# Patient Record
Sex: FEMALE | Race: BLACK OR AFRICAN AMERICAN | Employment: OTHER | URBAN - METROPOLITAN AREA
[De-identification: names, ages, dates, MRNs, and addresses within clinical notes are randomized per-mention and may not be internally consistent; named-entity substitution may affect disease eponyms.]

---

## 2017-05-30 ENCOUNTER — HOSPITAL ENCOUNTER (INPATIENT)
Facility: HOSPITAL | Age: 67
LOS: 8 days | Discharge: NURSING, REHAB OR LONG-TERM CARE | DRG: 871 | End: 2017-06-07
Attending: EMERGENCY MEDICINE | Admitting: INTERNAL MEDICINE
Payer: MEDICARE

## 2017-05-30 ENCOUNTER — APPOINTMENT (OUTPATIENT)
Dept: GENERAL RADIOLOGY | Facility: HOSPITAL | Age: 67
DRG: 871 | End: 2017-05-30
Attending: EMERGENCY MEDICINE
Payer: MEDICARE

## 2017-05-30 ENCOUNTER — APPOINTMENT (OUTPATIENT)
Dept: GENERAL RADIOLOGY | Facility: HOSPITAL | Age: 67
DRG: 871 | End: 2017-05-30
Attending: INTERNAL MEDICINE
Payer: MEDICARE

## 2017-05-30 DIAGNOSIS — I95.9 SEPSIS WITH HYPOTENSION (HCC): Primary | ICD-10-CM

## 2017-05-30 DIAGNOSIS — K44.9 HIATAL HERNIA: ICD-10-CM

## 2017-05-30 DIAGNOSIS — A41.9 SEPSIS WITH HYPOTENSION (HCC): Primary | ICD-10-CM

## 2017-05-30 DIAGNOSIS — K92.2 GASTROINTESTINAL HEMORRHAGE, UNSPECIFIED GASTROINTESTINAL HEMORRHAGE TYPE: ICD-10-CM

## 2017-05-30 DIAGNOSIS — K29.70 GASTRITIS: ICD-10-CM

## 2017-05-30 PROCEDURE — 71010 XR CHEST AP PORTABLE  (CPT=71010): CPT | Performed by: EMERGENCY MEDICINE

## 2017-05-30 PROCEDURE — 99291 CRITICAL CARE FIRST HOUR: CPT | Performed by: INTERNAL MEDICINE

## 2017-05-30 PROCEDURE — 71010 XR CHEST AP PORTABLE  (CPT=71010): CPT | Performed by: INTERNAL MEDICINE

## 2017-05-30 RX ORDER — SODIUM CHLORIDE 9 MG/ML
INJECTION, SOLUTION INTRAVENOUS CONTINUOUS
Status: DISCONTINUED | OUTPATIENT
Start: 2017-05-30 | End: 2017-06-03

## 2017-05-30 RX ORDER — ARGININE/ASCORBATE SOD/VITE AC 4.5 G/9.2G
1 POWDER IN PACKET (EA) ORAL 2 TIMES DAILY
Status: ON HOLD | COMMUNITY
End: 2021-02-07

## 2017-05-30 RX ORDER — PHENYTOIN 125 MG/5ML
200 SUSPENSION ORAL 2 TIMES DAILY
COMMUNITY

## 2017-05-30 RX ORDER — GEL DRESSING
1 GEL (GRAM) TOPICAL DAILY
Status: ON HOLD | COMMUNITY
End: 2021-02-07

## 2017-05-30 RX ORDER — PHENYTOIN 125 MG/5ML
200 SUSPENSION ORAL 2 TIMES DAILY
Status: DISCONTINUED | OUTPATIENT
Start: 2017-05-30 | End: 2017-06-07

## 2017-05-30 RX ORDER — MULTIVITAMIN
TABLET ORAL DAILY
COMMUNITY

## 2017-05-30 RX ORDER — CLONAZEPAM 0.5 MG/1
0.5 TABLET ORAL 2 TIMES DAILY PRN
Status: ON HOLD | COMMUNITY
End: 2021-02-07

## 2017-05-30 RX ORDER — SODIUM CHLORIDE 9 MG/ML
INJECTION, SOLUTION INTRAVENOUS CONTINUOUS
Status: DISCONTINUED | OUTPATIENT
Start: 2017-05-30 | End: 2017-06-02

## 2017-05-30 RX ORDER — ACETAMINOPHEN 325 MG/1
650 TABLET ORAL DAILY
COMMUNITY

## 2017-05-30 RX ORDER — ERGOCALCIFEROL (VITAMIN D2) 1250 MCG
5000 CAPSULE ORAL WEEKLY
COMMUNITY

## 2017-05-30 RX ORDER — FENTANYL 25 UG/H
1 PATCH TRANSDERMAL
Status: ON HOLD | COMMUNITY
End: 2021-02-07

## 2017-05-30 RX ORDER — ACETAMINOPHEN 325 MG/1
650 TABLET ORAL EVERY 4 HOURS PRN
COMMUNITY

## 2017-05-30 RX ORDER — MELATONIN
325 2 TIMES DAILY WITH MEALS
COMMUNITY

## 2017-05-30 RX ORDER — ZINC SULFATE 50(220)MG
110 CAPSULE ORAL 2 TIMES DAILY
COMMUNITY

## 2017-05-30 RX ORDER — MINERAL OIL/PETROLATUM,WHITE
CREAM (GRAM) TOPICAL AS NEEDED
COMMUNITY

## 2017-05-30 RX ORDER — PIPERACILLIN SODIUM, TAZOBACTAM SODIUM 4; .5 G/20ML; G/20ML
4 INJECTION, POWDER, LYOPHILIZED, FOR SOLUTION INTRAVENOUS EVERY 8 HOURS
Status: ON HOLD | COMMUNITY
End: 2017-06-07

## 2017-05-30 NOTE — ED PROVIDER NOTES
Patient Seen in: [facility] Emergency Department    History   Patient presents with:  Hypotension (cardiovascular)    Stated Complaint: hypotension    HPI    73year old female chronically debilitated, noncommunicative from nursing home with history are normal. Mucous membranes are not cyanotic. Eyes: Conjunctivae and lids are normal. Right conjunctiva is not injected. Left conjunctiva is not injected. No scleral icterus. Neck: No JVD present. Cardiovascular: Tachycardia present.     Pulmonary/Ch within normal limits   TROPONIN I, 0 HOUR - Normal   CBC WITH DIFFERENTIAL WITH PLATELET    Narrative: The following orders were created for panel order CBC WITH DIFFERENTIAL WITH PLATELET.   Procedure                               Abnormality         S Vancomycin HCl (VANCOCIN) 1,000 mg in sodium chloride 0.9 % 250 mL IVPB add-vantage (1,000 mg Intravenous New Bag 5/30/17 8552)         Procedures: None    Re-Evaluation: 4:50 PM, distal perfusion improved after initial bolus, blood pressure also improvi pressor support, admit icu. Further Inpatient evaluation and treatment will be required.  I personally discussed the results of the above ED workup and a number of associated acute management issues with the patient,though I dont believe she understands at

## 2017-05-30 NOTE — ED INITIAL ASSESSMENT (HPI)
Pt from [facility] NH sent for hypotension 70/30 and possible sepsis. Previously on vanco for ulcer, finished 2 weeks ago. 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 Pt with dopamine running per EMS with 0.9 bolus.

## 2017-05-30 NOTE — CONSULTS
[facility] HOSP - [facility]    Report of Consultation    Payton Guillecyndi Patient Status:  Emergency    1950 MRN [id number]   Location [address] Attending Samantha Cortes MD   Hosp Day # 0 PCP Dong Valerio MD, Boaz Catherine MD Continuous    Review of Systems: unable to discuss ROS with patient since she is non verbal  Physical Exam:    BP 89/59 mmHg  Pulse 132  Temp(Src) 101.4 °F (38.6 °C) (Rectal)  Resp 15  Ht 5' 5\" (1.651 m)  Wt 98 lb (44.453 kg)  BMI 16.31 kg/m2  SpO2 98% thoracic aorta. 2. Bilateral mixed alveolar and interstitial opacification, more pronounced in a right perihilar and basilar region. Minimal interstitial prominence left infrahilar region.  Old healed left rib fracture deformities                 Assessment

## 2017-05-31 ENCOUNTER — APPOINTMENT (OUTPATIENT)
Dept: GENERAL RADIOLOGY | Facility: HOSPITAL | Age: 67
DRG: 871 | End: 2017-05-31
Attending: INTERNAL MEDICINE
Payer: MEDICARE

## 2017-05-31 PROCEDURE — 99233 SBSQ HOSP IP/OBS HIGH 50: CPT | Performed by: INTERNAL MEDICINE

## 2017-05-31 PROCEDURE — 71010 XR CHEST AP PORTABLE  (CPT=71010): CPT | Performed by: INTERNAL MEDICINE

## 2017-05-31 RX ORDER — 0.9 % SODIUM CHLORIDE 0.9 %
VIAL (ML) INJECTION
Status: COMPLETED
Start: 2017-05-31 | End: 2017-05-31

## 2017-05-31 NOTE — SEPSIS REASSESSMENT
[facility] - [facility]    Sepsis Reassessment Note    BP 90/56 mmHg  Pulse 137  Temp(Src) 99.7 °F (37.6 °C) (Temporal)  Resp 21  Ht 5' 5\" (1.651 m)  Wt 98 lb (44.453 kg)  BMI 16.31 kg/m2  SpO2 98%     7:58 PM    Cardiac:  Regularity: Regular  Rate:

## 2017-05-31 NOTE — CONSULTS
[facility]    Report of Consultation    Remigio Rosales Patient Status:  Inpatient    1950 MRN [id number]   Location [facility] 2W/SW Attending Lizzette Tan MD   Hosp Day # 0 PCP Dayne Sherwood MD, Marcia Goncalves MD     Date of Admi mg by mouth 2 (two) times daily. ClonazePAM 0.5 MG Oral Tab Take 0.5 mg by mouth 2 (two) times daily as needed for Anxiety. DERMACERIN External Cream Apply topically as needed.    ergocalciferol 60169 units Oral Cap Take 5,000 Units by mouth once a week soft, non tender  Extremities: Contracted extremities  Neurologic: Nonverbal  Skin: warm, sacral decubitus ulcer present    Results:   Laboratory Data    Lab Results  Component Value Date   WBC 8.0 05/30/2017   HGB 9.3* 05/30/2017   HCT 28.3* 05/30/2017 bleeding. Endoscopy discussed with family by GI but declined at this time.   Trend hemoglobin.  -We will place NG tube and start home medications  -DVT prophylaxis: SCDs given questionable rectal bleeding  -Her prior records, patient full code at this time

## 2017-05-31 NOTE — CONSULTS
[facility]    Report of Consultation    Remigio Rosales Patient Status:  Inpatient    1950 MRN [id number]   Location [facility] 2W/SW Attending Lizzette Tan MD   Hosp Day # 1 PCP Dayne Sherwood MD, Marcia Goncalves MD     Date of Admi DERMACERIN External Cream Apply topically as needed. ergocalciferol 74219 units Oral Cap Take 5,000 Units by mouth once a week. fentaNYL 25 MCG/HR Transdermal Patch 72 Hr Place 1 patch onto the skin every third day.    ferrous sulfate 325 (65 FE) MG O soft  No c/c/e      Results:     Laboratory Data:    Lab Results  Component Value Date   WBC 14.8* 05/31/2017   HGB 9.2* 05/31/2017   HCT 28.0* 05/31/2017    05/31/2017   CREATSERUM 0.88 05/31/2017   BUN 39* 05/31/2017   * 05/31/2017   K 3.3 0 consolidations    Recommendations:    - d/c vancomycin, zosyn  - meropenem  - await id, sens GNR  - await Ucx  - follow temp, wbc to doc decr  - monitor for improvement  - will follow    Thank you for allowing me to participate in the care of your patient.

## 2017-05-31 NOTE — CM/SW NOTE
+BPCI. Patient is nonverbal.  Message left for family that patient is eligible for BPCI f/u for 3 months at [facility]. BPCI letter, Brochure and [facility]'s phone # left at bedside. Message left for sis-in-law (guardian) .     [facility]

## 2017-05-31 NOTE — PROGRESS NOTES
[facility]    GI Progress Note      Jadine Specking Patient Status:  Inpatient    1950 MRN [id number]   Location [facility] 2W/SW Attending Ryan Keenan MD   Hosp Day # 1 PCP Steve Elliott MD, Leland Mendez MD          SUBJECTIVE: fractures. 4. Atherosclerotic calcification aorta. Xr Chest Ap Portable  (cpt=71010)    5/30/2017  CONCLUSION:  1. Borderline cardiomegaly. Tortuous thoracic aorta.  2. Bilateral mixed alveolar and interstitial opacification, more pronounced in a ri

## 2017-05-31 NOTE — PLAN OF CARE
CARDIOVASCULAR - ADULT    • Maintains optimal cardiac output and hemodynamic stability Not Progressing        HEMATOLOGIC - ADULT    • Maintains hematologic stability Not Progressing          CARDIOVASCULAR - ADULT    • Absence of cardiac arrhythmias or at

## 2017-05-31 NOTE — PROGRESS NOTES
[facility] dosing service    Initial Pharmacokinetic Consult for Vancomycin Dosing     Natali Ruiz is a 73year old female admitted on 5/30 who is being treated for sepsis. Pharmacy has been asked to dose Vancomycin by Dr. Andrea Carbone.     She has No Know her.  We appreciate the opportunity to assist in her care.     Roman Vicente, PharmD  5/30/2017  8:51 PM  [address]: [phone]

## 2017-05-31 NOTE — PROGRESS NOTES
[facility] [place] [facility]     Progress Note        Phill Hooker Patient Status:  Inpatient    1950 MRN [id number]   Location [facility] 2W/SW Attending Gabrielle Pablo MD   Hosp Day # 1 PCP Jevon Looney MD, Haroon Escamilla MD       Subjective: Nonverbal  Skin: warm, dry      Results:     Lab Results  Component Value Date   WBC 14.8 05/31/2017   HGB 9.2 05/31/2017   HCT 28.0 05/31/2017    05/31/2017   CREATSERUM 0.88 05/31/2017   BUN 39 05/31/2017    05/31/2017   K 3.3 05/31/2017   C Hypoalbuminemia  9.  Dysphagia  10.  Lactic acidosis  11. Gram-negative bacteremia  12.   Transaminitis     Plan   -Patient presents from nursing home with chief complaint of hypotension.  Nonverbal.  Recently admitted to [facility]

## 2017-05-31 NOTE — WOUND PROGRESS NOTE
WOUND CARE NOTE      PLAN   Recommendations:  Turn schedules  Specialty bed: First step overlay  Heels elevated using pillows, heel wedge or heel boots to offload heels  To prevent sliding: decrease head of bed and elevate foot of bed as medical conditio

## 2017-05-31 NOTE — PROGRESS NOTES
[facility]    Progress Note    Heavenly Montalvo Patient Status:  Inpatient    1950 MRN [id number]   Location [facility] 2W/SW Attending Ray Loya MD   Hosp Day # 1 PCP Jaye Crook MD, Milly Crowley MD     Subjective:     Jean-Paul Alvarado fractures. 4. Atherosclerotic calcification aorta. Xr Chest Ap Portable  (cpt=71010)    5/30/2017  CONCLUSION:  1. Borderline cardiomegaly. Tortuous thoracic aorta.  2. Bilateral mixed alveolar and interstitial opacification, more pronounced in a ri

## 2017-05-31 NOTE — PROGRESS NOTES
Merrem (meropenem) 500 mg IV q8h was ordered for Barbra Hallman by Dr. Marry Samson. Body mass index is 17.61 kg/(m^2). Wt Readings from Last 6 Encounters:  05/31/17 : 105 lb 12.8 oz      Estimated Creatinine Clearance: 48.3 mL/min (based on Cr of 0.88).

## 2017-05-31 NOTE — PLAN OF CARE
Problem: NEUROLOGICAL - ADULT  Goal: Achieves stable or improved neurological status  INTERVENTIONS  - Assess for and report changes in neurological status  - Initiate measures to prevent increased intracranial pressure  - Maintain blood pressure and fluid based on oxygen saturation or ABGs  - Provide Smoking Cessation handout, if applicable  - Encourage broncho-pulmonary hygiene including cough, deep breathe, Incentive Spirometry  - Assess the need for suctioning and perform as needed  - Assess and instruct Progressing    Problem: HEMATOLOGIC - ADULT  Goal: Maintains hematologic stability  INTERVENTIONS  - Assess for signs and symptoms of bleeding or hemorrhage  - Monitor labs and vital signs for trends  - Administer supportive blood products/factors, fluids

## 2017-05-31 NOTE — H&P
[facility] HOSP - [facility]    History and Physical    José Miguel Ferguson Patient Status:  Inpatient    1950 MRN [id number]   Location [facility] 2W/SW Attending Cristy Sheldon MD   Hosp Day # 0 PCP Adele Murry MD, Freda Yuan MD     Date:  20 (4-0.5) g Intravenous Recon Soln 4 mg every 8 (eight) hours. collagenase (SANTYL) 250 UNIT/GM External Ointment Apply topically daily. acetaminophen 325 MG Oral Tab Take 650 mg by mouth every 4 (four) hours as needed for Pain.    acetaminophen 325 MG Or Nasogastric tube at junction between gastric fundus and body. 2. Minimal atelectasis in the right lower lung. No evidence for pneumonia. 3. Multiple left rib fractures. 4. Atherosclerotic calcification aorta.          Xr Chest Ap Portable  (cpt=71010)    5/

## 2017-06-01 ENCOUNTER — APPOINTMENT (OUTPATIENT)
Dept: ULTRASOUND IMAGING | Facility: HOSPITAL | Age: 67
DRG: 871 | End: 2017-06-01
Attending: INTERNAL MEDICINE
Payer: MEDICARE

## 2017-06-01 ENCOUNTER — APPOINTMENT (OUTPATIENT)
Dept: GENERAL RADIOLOGY | Facility: HOSPITAL | Age: 67
DRG: 871 | End: 2017-06-01
Attending: INTERNAL MEDICINE
Payer: MEDICARE

## 2017-06-01 PROCEDURE — 71010 XR CHEST AP PORTABLE  (CPT=71010): CPT | Performed by: INTERNAL MEDICINE

## 2017-06-01 PROCEDURE — 76700 US EXAM ABDOM COMPLETE: CPT | Performed by: INTERNAL MEDICINE

## 2017-06-01 PROCEDURE — 99233 SBSQ HOSP IP/OBS HIGH 50: CPT | Performed by: INTERNAL MEDICINE

## 2017-06-01 RX ORDER — POTASSIUM CHLORIDE 14.9 MG/ML
20 INJECTION INTRAVENOUS ONCE
Status: COMPLETED | OUTPATIENT
Start: 2017-06-01 | End: 2017-06-01

## 2017-06-01 NOTE — PROGRESS NOTES
INFECTIOUS DISEASE PROGRESS NOTE    Rah Linares Patient Status:  Inpatient    1950 MRN [id number]   Location [facility] 2W/SW Attending Rachel Bloom MD   Hosp Day # 2 PCP Jf Ward MD, Isiah Burks MD     Subjective:  Patient seen and ex obvious calculus.  The right kidney length measures 11.1 cm. Right renal cyst measuring 1.7 x 1.2 x 1.3 cm. The left kidney length measures 10.9 cm.  Left renal cyst measuring 2.4 x 1.8 x 2.2 cm. AORTA/VASCULAR:     Normal.  No aneurysm.  Patent IVC.

## 2017-06-01 NOTE — PROGRESS NOTES
Pharmacy adjusted Merrem (meropenem) dosing to 500 mg IV q12 for [place] due to renal impairment. Body mass index is 17.77 kg/(m^2).   Wt Readings from Last 6 Encounters:  06/01/17 : 106 lb 12.8 oz      Estimated Creatinine Clearance: 76.5 mL/min

## 2017-06-01 NOTE — PROGRESS NOTES
[facility]     Progress Note        Heavenly Montalvo Patient Status:  Inpatient    1950 MRN [id number]   Location [facility] 2W/SW Attending Ray Loya MD   Hosp Day # 2 PCP Jaye Crook MD, Milly Crowley MD       Subjective: Nonverbal  Skin: warm, dry      Results:       Lab Results  Component Value Date   WBC 16.6 06/01/2017   HGB 9.2 06/01/2017   HCT 28.6 06/01/2017    06/01/2017   CREATSERUM 0.56 06/01/2017   BUN 25 06/01/2017    06/01/2017   K 3.0 06/01/2017 occurred Electronically signed on 05/30/2017 at 16:44 by Hudson Weber   1.  Septic shock  2.  Possible blood loss anemia  3.  Proteus UTI  4.  Traumatic brain injury  5.  Likely infected sacral decubitus ulcer  6.  Acute kidney injury

## 2017-06-01 NOTE — PROGRESS NOTES
[facility] HOSP - [facility]    GI Progress Note      Liyah Deluca Patient Status:  Inpatient    1950 MRN [id number]   Location [facility] 2W/SW Attending Fantasma Kahn MD   Hosp Day # 2 PCP Justina Parry MD, William Clifford MD          SUBJECTIVE: LFTs and Hgb  No plans for endoscopy  Consider keofeed tube feeding   Eventual swallow evaluation. May need G tube for long term nutritional support.     Saroj Castellano MD  6/1/2017  11:38 AM

## 2017-06-01 NOTE — DIETARY NOTE
ADULT NUTRITION INITIAL ASSESSMENT    Pt is at moderate nutrition risk. Pt meets malnutrition criteria.       CRITERIA FOR MALNUTRITION DIAGNOSIS:  Criteria for severe malnutrition diagnosis: chronic illness related to energy intake less than 75% for great resume + thiamine 100mg daily.     - Feeding assistance: NPO    - Nutrition education: not appropriate    - Coordination of nutrition care: collaboration with other providers    - Discharge and transfer of nutrition care to new setting or provider: Monitor 7. 8* 8.1*   * 146* 148*   K 3.6 3.3 3.0*  3.0*    112* 116*   CO2 25 21* 21*   OSMOCALC 315* 314* 311*   Renal labs improving. LFT's improving. Low K- on electrolyte protocol. Suspect may have low Mg++ and Phos as well.  Ordered for am.       NU

## 2017-06-01 NOTE — PROGRESS NOTES
[facility]    Progress Note    Runell Filter Patient Status:  Inpatient    1950 MRN [id number]   Location [facility] 2W/SW Attending Marcial De León MD   Hosp Day # 2 PCP Chris Mendez MD, Vinnie Page MD     Subjective:     Con (cpt=71010)    5/30/2017  CONCLUSION:  1. Nasogastric tube at junction between gastric fundus and body. 2. Minimal atelectasis in the right lower lung. No evidence for pneumonia. 3. Multiple left rib fractures. 4. Atherosclerotic calcification aorta.

## 2017-06-01 NOTE — PLAN OF CARE
Problem: Patient Centered Care  Goal: Patient preferences are identified and integrated in the patient’s plan of care  Interventions:  - What would you like us to know as we care for you?  Patient’s guardian would like her to be comfortable  - Provide timel needed   Outcome: Not Progressing    Problem: SAFETY ADULT - FALL  Goal: Free from fall injury  INTERVENTIONS:  - Assess pt frequently for physical needs  - Identify cognitive and physical deficits and behaviors that affect risk of falls.   - [place] fall

## 2017-06-01 NOTE — PLAN OF CARE
Problem: ALTERED NUTRIENT INTAKE - ADULT  Goal: Nutrient intake appropriate for improving, restoring or maintaining nutritional needs  INTERVENTIONS:  - Assess nutritional status and recommend course of action  - Monitor oral intake if resumed, labs, and t

## 2017-06-02 PROCEDURE — 02HV33Z INSERTION OF INFUSION DEVICE INTO SUPERIOR VENA CAVA, PERCUTANEOUS APPROACH: ICD-10-PCS | Performed by: INTERNAL MEDICINE

## 2017-06-02 PROCEDURE — 99232 SBSQ HOSP IP/OBS MODERATE 35: CPT | Performed by: INTERNAL MEDICINE

## 2017-06-02 RX ORDER — MAGNESIUM SULFATE HEPTAHYDRATE 40 MG/ML
2 INJECTION, SOLUTION INTRAVENOUS ONCE
Status: COMPLETED | OUTPATIENT
Start: 2017-06-02 | End: 2017-06-02

## 2017-06-02 RX ORDER — SODIUM CHLORIDE 9 MG/ML
INJECTION, SOLUTION INTRAVENOUS
Status: COMPLETED
Start: 2017-06-02 | End: 2017-06-02

## 2017-06-02 RX ORDER — LIDOCAINE HYDROCHLORIDE 10 MG/ML
0.5 INJECTION, SOLUTION INFILTRATION; PERINEURAL ONCE AS NEEDED
Status: ACTIVE | OUTPATIENT
Start: 2017-06-02 | End: 2017-06-02

## 2017-06-02 RX ORDER — POTASSIUM CHLORIDE 29.8 MG/ML
40 INJECTION INTRAVENOUS ONCE
Status: COMPLETED | OUTPATIENT
Start: 2017-06-02 | End: 2017-06-02

## 2017-06-02 RX ORDER — SODIUM CHLORIDE 0.9 % (FLUSH) 0.9 %
10 SYRINGE (ML) INJECTION AS NEEDED
Status: DISCONTINUED | OUTPATIENT
Start: 2017-06-02 | End: 2017-06-07

## 2017-06-02 RX ORDER — DEXTROSE MONOHYDRATE 100 MG/ML
INJECTION, SOLUTION INTRAVENOUS CONTINUOUS PRN
Status: DISCONTINUED | OUTPATIENT
Start: 2017-06-02 | End: 2017-06-07

## 2017-06-02 NOTE — DIETARY NOTE
Nutrition Note Update:    Received consult for TF management. Discussed with RN TF specifics and need to replete lytes ( Mg and phos). When safe to initiate TF, use Jevity 1.2 @ 25ml/hr, increase by 10 ml q 12 hrs to minimum goal rate of 50ml/hr.

## 2017-06-02 NOTE — PROGRESS NOTES
PICC Line Insertion Note    Procedure:  Insertion of # 5 FR / Double Power Solo    Indications:  Home IV therapy, Poor Venous Access    Procedure Details   Patient and/or significant others educated regarding insertion of PICC line, rationale for procedure,

## 2017-06-02 NOTE — PROGRESS NOTES
[place] [facility] - [facility]    Progress Note    Runell Filter Patient Status:  Inpatient    1950 MRN [id number]   Location [facility] 2W/SW Attending Marcial De León MD   Hosp Day # 3 PCP Chris Mendez MD, Vinnie Page MD     Subjective:     Jonathan Nelson

## 2017-06-02 NOTE — PROGRESS NOTES
INFECTIOUS DISEASE PROGRESS NOTE    Wayne Garrison Patient Status:  Inpatient    1950 MRN [id number]   Location [facility] 2W/SW Attending Angel Leary MD   Hosp Day # 3 PCP Neville Randolph MD, Saskia Sahni MD     Subjective:  Patient seen and ex unremarkable. SPLEEN:          Normal.  Normal size and echotexture.  Spleen length measures 10.7 cm.    KIDNEYS:        No mass, obstruction or obvious calculus.  The right kidney length measures 11.1 cm. Right renal cyst measuring 1.7 x 1.2 x 1.3 cm.  Lawerance Matar

## 2017-06-02 NOTE — PLAN OF CARE
Inpatient to Inpatient Transfer    Reason for Transfer:  No longer ccu status     SBAR Reviewed and Verbal Report:  Given to shantel TAYLOR    Patient's Family Notified of Transfer and Given Unit Phone Number/Visiting Hours:  Yes      Comments:  Patient sent w

## 2017-06-02 NOTE — PROGRESS NOTES
[place] [facility] - [facility]     Progress Note        Isaiah Vega Patient Status:  Inpatient    1950 MRN [id number]   Location [facility] 2W/SW Attending Gisselle Marinelli MD   Hosp Day # 3 PCP Austyn Matute MD, Evangelista Love MD       Subjective: Date   WBC 13.7 06/02/2017   HGB 9.7 06/02/2017   HCT 29.9 06/02/2017    06/02/2017   CREATSERUM 0.59 06/02/2017   BUN 21 06/02/2017    06/02/2017   K 3.4 06/02/2017   K 3.4 06/02/2017    06/02/2017   CO2 21 06/02/2017   GLU 89 06/02/201

## 2017-06-02 NOTE — PROGRESS NOTES
[place] [facility] - [facility]    GI Progress Note      Roseanne Franklin Patient Status:  Inpatient    1950 MRN [id number]   Location [facility] 2W/SW Attending Missael Padgett MD   Hosp Day # 3 PCP DELIO RODRIGUEZ [facility] MD, Malia Wu MD          SUBJECTIVE:

## 2017-06-03 PROCEDURE — 99233 SBSQ HOSP IP/OBS HIGH 50: CPT | Performed by: INTERNAL MEDICINE

## 2017-06-03 RX ORDER — ACETAMINOPHEN 160 MG/5ML
650 SOLUTION ORAL EVERY 6 HOURS PRN
Status: DISCONTINUED | OUTPATIENT
Start: 2017-06-03 | End: 2017-06-07

## 2017-06-03 RX ORDER — MAGNESIUM SULFATE HEPTAHYDRATE 40 MG/ML
2 INJECTION, SOLUTION INTRAVENOUS ONCE
Status: COMPLETED | OUTPATIENT
Start: 2017-06-03 | End: 2017-06-03

## 2017-06-03 RX ORDER — DEXTROSE MONOHYDRATE 50 MG/ML
2000 INJECTION, SOLUTION INTRAVENOUS CONTINUOUS
Status: DISPENSED | OUTPATIENT
Start: 2017-06-03 | End: 2017-06-04

## 2017-06-03 NOTE — PROGRESS NOTES
[facility]  Gastroenterology Progress Note    Payton Guillecyndi Patient Status:  Inpatient    1950 MRN [id number]   Location [facility] 5SW/SE Attending Anna MD Earl   Hosp Day # 4 PCP Dong Valerio MD, Boaz Catherine MD     Subjective:  R

## 2017-06-03 NOTE — PLAN OF CARE
Problem: Patient/Family Goals  Goal: Patient/Family Long Term Goal  Patient’s Long Term Goal: Patient’s guardian would like her to be discharged    Interventions:  - follow plan of care  - See additional Care Plan goals for specific interventions   Outcome Assess for changes in mentation and behavior  - Position to facilitate oxygenation and minimize respiratory effort  - Oxygen supplementation based on oxygen saturation or ABGs  - Provide Smoking Cessation handout, if applicable  - Encourage broncho-pulmona frequently for physical needs  - Identify cognitive and physical deficits and behaviors that affect risk of falls.   - [place] fall precautions as indicated by assessment.  - Educate pt/family on patient safety including physical limitations  - Instruct p

## 2017-06-03 NOTE — PROGRESS NOTES
Pulmonary/Critical Care Follow Up Note    HPI:   Daniel Wolff is a 73year old female with Patient presents with:  Hypotension (cardiovascular)      PCP Julio Cesar Colón MD, Mayito Clarke MD  Admission Attending Tianna Silva 15 Day #4    Non verbal 06/03/2017   PHOS 2.4 06/03/2017       Lab Results  Component Value Date   HGB 9.7* 06/02/2017   HGB 9.2* 06/01/2017   HGB 9.2* 05/31/2017   HGB 9.3* 05/30/2017           ASSESSMENT/PLAN:     1.  Septic shock  Resolved    2.  Anemia  Stable    3.  Proteus

## 2017-06-04 PROCEDURE — 99232 SBSQ HOSP IP/OBS MODERATE 35: CPT | Performed by: INTERNAL MEDICINE

## 2017-06-04 RX ORDER — SODIUM CHLORIDE 9 MG/ML
INJECTION, SOLUTION INTRAVENOUS
Status: COMPLETED
Start: 2017-06-04 | End: 2017-06-04

## 2017-06-04 RX ORDER — MAGNESIUM SULFATE HEPTAHYDRATE 40 MG/ML
2 INJECTION, SOLUTION INTRAVENOUS ONCE
Status: COMPLETED | OUTPATIENT
Start: 2017-06-04 | End: 2017-06-04

## 2017-06-04 RX ORDER — SODIUM CHLORIDE 9 MG/ML
INJECTION, SOLUTION INTRAVENOUS CONTINUOUS
Status: DISCONTINUED | OUTPATIENT
Start: 2017-06-04 | End: 2017-06-06

## 2017-06-04 NOTE — PROGRESS NOTES
[facility]    Progress Note    Hema Adas Patient Status:  Inpatient    1950 MRN [id number]   Location [facility] 5SW/SE Attending Roberto Jorgensen MD   Hosp Day # 5 PCP Selin Haque MD, Aggie Espinal MD     Subjective:     Dayton Guevara

## 2017-06-04 NOTE — PLAN OF CARE
Problem: Patient Centered Care  Goal: Patient preferences are identified and integrated in the patient’s plan of care  Interventions:  - What would you like us to know as we care for you?  Patient’s guardian would like her to be comfortable  - Provide timel arrhythmias or at baseline  INTERVENTIONS:  - Continuous cardiac monitoring, monitor vital signs, obtain 12 lead EKG if indicated  - Evaluate effectiveness of antiarrhythmic and heart rate control medications as ordered  - Initiate emergency measures for l fall precautions as indicated by assessment.  - Educate pt/family on patient safety including physical limitations  - Instruct pt to call for assistance with activity based on assessment  - Modify environment to reduce risk of injury  - Provide assistive d

## 2017-06-04 NOTE — PROGRESS NOTES
[place] [facility] - [facility]    Progress Note    Chela Lilly Patient Status:  Inpatient    1950 MRN [id number]   Location [facility] 5SW/SE Attending Christina López MD   Hosp Day # 5 PCP Asa Packer MD, Danyell Mcallister MD     Subjective:     David Kaur

## 2017-06-05 PROCEDURE — 99232 SBSQ HOSP IP/OBS MODERATE 35: CPT | Performed by: INTERNAL MEDICINE

## 2017-06-05 RX ORDER — MAGNESIUM SULFATE HEPTAHYDRATE 40 MG/ML
2 INJECTION, SOLUTION INTRAVENOUS ONCE
Status: COMPLETED | OUTPATIENT
Start: 2017-06-05 | End: 2017-06-05

## 2017-06-05 RX ORDER — HYDROCODONE BITARTRATE AND ACETAMINOPHEN 5; 325 MG/1; MG/1
1 TABLET ORAL EVERY 8 HOURS PRN
Status: DISCONTINUED | OUTPATIENT
Start: 2017-06-05 | End: 2017-06-07

## 2017-06-05 NOTE — WOUND PROGRESS NOTE
WOUND CARE NOTE      PLAN   Recommendations:  Dietary is following the pt.   Turn schedules  Specialty bed: 1st step overlay air mattress in place  Heels elevated using pillows, heel wedge or heel boots to offload heels  Use of lift equipment  To prevent ALKPHO 139* 06/02/2017   BILT 1.2 06/02/2017   TP 4.9* 06/02/2017   AST 94* 06/02/2017   ALT 73* 06/02/2017   MG 1.7* 06/05/2017   PHOS 2.4 06/03/2017

## 2017-06-05 NOTE — PROGRESS NOTES
GI  PROGRESS NOTE    SUBJECTIVE: requested to see pt re G-tube placement. Notes reviewed. Pt non-verbal, opens eyes spontaneously.        OBJECTIVE:  Temp:  [96.9 °F (36.1 °C)-97.5 °F (36.4 °C)] 96.9 °F (36.1 °C)  Pulse:  [84-92] 84  Resp:  [18-20] 18  BP

## 2017-06-05 NOTE — PROGRESS NOTES
INFECTIOUS DISEASE PROGRESS NOTE    Gissel Napoles Patient Status:  Inpatient    1950 MRN [id number]   Location [facility] 2W/SW Attending Rai Steen MD   Hosp Day # 6 PCP Kala Hair MD, Ham Cm MD     Subjective:  Patient seen and ex 10.9 cm.  Left renal cyst measuring 2.4 x 1.8 x 2.2 cm.   AORTA/VASCULAR:     Normal.  No aneurysm.  Patent IVC.     OTHER:           Small amount of ascites.      Dictated by (CST): Nory Cornejo MD on 6/01/2017 at 9:36        Approved by (CST): Cecilio Alvarez

## 2017-06-05 NOTE — CM/SW NOTE
Called Dr Dhiraj Mujica regarding plan for long term nutition goal. Patient will need G tube placement. Call to GI and Rn notified  Dr Mando Moss notified for G tube placement.  Md will come see the patient  Monica Colunga Phone # [phone]

## 2017-06-05 NOTE — PLAN OF CARE
Problem: NEUROLOGICAL - ADULT  Goal: Absence of seizures  INTERVENTIONS  - Monitor for seizure activity  - Administer anti-seizure medications as ordered  - Monitor neurological status   Outcome: Not Progressing  Pt non verbal, unable to express needs    P Progressing  Pt seen by wound nurse, with new orders.     Problem: SAFETY ADULT - FALL  Goal: Free from fall injury  INTERVENTIONS:  - Assess pt frequently for physical needs  - Identify cognitive and physical deficits and behaviors that affect risk of fall

## 2017-06-05 NOTE — PROGRESS NOTES
[facility]    Progress Note    Aditya Worthy Patient Status:  Inpatient    1950 MRN [id number]   Location [facility] 5SW/SE Attending Daniel Rose MD   Hosp Day # 6 PCP Macy Grullon MD, Natalio Steen MD     Subjective:     Hipolito Kent

## 2017-06-05 NOTE — PROGRESS NOTES
[facility]    Progress Note    Heavenly Montalvo Patient Status:  Inpatient    1950 MRN [id number]   Location [facility] 5SW/SE Attending Ray Loya MD   Hosp Day # 6 PCP Jaye Crook MD, Milly Crowley MD     Subjective:     Shan Terrazas

## 2017-06-06 ENCOUNTER — ANESTHESIA EVENT (OUTPATIENT)
Dept: ENDOSCOPY | Facility: HOSPITAL | Age: 67
DRG: 871 | End: 2017-06-06
Payer: MEDICARE

## 2017-06-06 ENCOUNTER — ANESTHESIA (OUTPATIENT)
Dept: ENDOSCOPY | Facility: HOSPITAL | Age: 67
DRG: 871 | End: 2017-06-06
Payer: MEDICARE

## 2017-06-06 ENCOUNTER — SURGERY (OUTPATIENT)
Age: 67
End: 2017-06-06

## 2017-06-06 PROCEDURE — 0DH68UZ INSERTION OF FEEDING DEVICE INTO STOMACH, VIA NATURAL OR ARTIFICIAL OPENING ENDOSCOPIC: ICD-10-PCS | Performed by: INTERNAL MEDICINE

## 2017-06-06 RX ORDER — MAGNESIUM SULFATE HEPTAHYDRATE 40 MG/ML
2 INJECTION, SOLUTION INTRAVENOUS ONCE
Status: COMPLETED | OUTPATIENT
Start: 2017-06-06 | End: 2017-06-06

## 2017-06-06 RX ORDER — DEXTROSE AND SODIUM CHLORIDE 5; .45 G/100ML; G/100ML
INJECTION, SOLUTION INTRAVENOUS CONTINUOUS
Status: DISCONTINUED | OUTPATIENT
Start: 2017-06-06 | End: 2017-06-07

## 2017-06-06 RX ORDER — SODIUM CHLORIDE 9 MG/ML
INJECTION, SOLUTION INTRAVENOUS
Status: COMPLETED
Start: 2017-06-06 | End: 2017-06-06

## 2017-06-06 RX ORDER — SODIUM CHLORIDE, SODIUM LACTATE, POTASSIUM CHLORIDE, CALCIUM CHLORIDE 600; 310; 30; 20 MG/100ML; MG/100ML; MG/100ML; MG/100ML
INJECTION, SOLUTION INTRAVENOUS CONTINUOUS PRN
Status: DISCONTINUED | OUTPATIENT
Start: 2017-06-06 | End: 2017-06-06 | Stop reason: SURG

## 2017-06-06 RX ORDER — MORPHINE SULFATE 2 MG/ML
2 INJECTION, SOLUTION INTRAMUSCULAR; INTRAVENOUS EVERY 6 HOURS PRN
Status: DISCONTINUED | OUTPATIENT
Start: 2017-06-06 | End: 2017-06-07

## 2017-06-06 RX ADMIN — SODIUM CHLORIDE, SODIUM LACTATE, POTASSIUM CHLORIDE, CALCIUM CHLORIDE: 600; 310; 30; 20 INJECTION, SOLUTION INTRAVENOUS at 14:51:00

## 2017-06-06 RX ADMIN — SODIUM CHLORIDE, SODIUM LACTATE, POTASSIUM CHLORIDE, CALCIUM CHLORIDE: 600; 310; 30; 20 INJECTION, SOLUTION INTRAVENOUS at 14:25:00

## 2017-06-06 NOTE — ANESTHESIA PREPROCEDURE EVALUATION
Anesthesia PreOp Note    HPI:     Ana Ocampo is a 73year old female who presents for preoperative consultation requested by: Gabrielle Solis MD    Date of Surgery: 6/6/2017    Procedure(s):  PERCUTANEOUS ENDOSCOPIC GASTROSTOMY PLACEMENT(PEG)  ESOPHAGOGAS Intravenous Recon Soln 4 mg every 8 (eight) hours. Disp:  Rfl:     collagenase (SANTYL) 250 UNIT/GM External Ointment Apply topically daily. Disp:  Rfl:     acetaminophen 325 MG Oral Tab Take 650 mg by mouth every 4 (four) hours as needed for Pain.  Disp: dextrose 10 % infusion  Intravenous Continuous PRN Kenneth Mixon DO     Pantoprazole Sodium (PROTONIX) 40 mg in Sodium Chloride 0.9 % 10 mL IV push 40 mg Intravenous Q24H Claudine Dumont MD 40 mg at 06/05/17 1726    phenytoin (DILANTIN) 125 MG/5ML Height:       Weight:       SpO2: 97% 96% 98% 97%        Anesthesia ROS/Med Hx and Physical Exam     Patient summary reviewed    Airway   Comment: Unable to participate in assessment  Dental      Pulmonary    (+) decreased breath sounds,   Cardiovascular

## 2017-06-06 NOTE — PROGRESS NOTES
[facility]    Progress Note    Rah Linares Patient Status:  Inpatient    1950 MRN [id number]   Location [facility] 5SW/SE Attending Rachel Bloom MD   Hosp Day # 7 PCP Jf Ward MD, Isiah Burks MD     Subjective:     Thedamaso Semen

## 2017-06-06 NOTE — PLAN OF CARE
Problem: Patient Centered Care  Goal: Patient preferences are identified and integrated in the patient’s plan of care  Interventions:  - What would you like us to know as we care for you?  Patient’s guardian would like her to be comfortable  - Provide timel for seizure activity  - Administer anti-seizure medications as ordered  - Monitor neurological status   Outcome: Progressing  No seizure activity this shift.      Problem: CARDIOVASCULAR - ADULT  Goal: Maintains optimal cardiac output and hemodynamic stabil Maintains or returns to baseline bowel function  INTERVENTIONS:  - Assess bowel function  - Maintain adequate hydration with IV or PO as ordered and tolerated  - Evaluate effectiveness of GI medications  - Encourage mobilization and activity  - Obtain nutr Assess pt frequently for physical needs  - Identify cognitive and physical deficits and behaviors that affect risk of falls.   - Jewett fall precautions as indicated by assessment.  - Educate pt/family on patient safety including physical limitations  -

## 2017-06-06 NOTE — DIETARY NOTE
ADULT NUTRITION INITIAL ASSESSMENT    Pt is at moderate nutrition risk. Pt meets malnutrition criteria.       CRITERIA FOR MALNUTRITION DIAGNOSIS:  Criteria for severe malnutrition diagnosis: chronic illness related to energy intake less than 75% for great iron, zinc and PTA. Would resume + thiamine 100mg daily.   - Feeding assistance: NPO  - Discharge and transfer of nutrition care to new setting or provider: Monitor plans. From [facility] PTA.      ADMITTING DIAGNOSIS: Sepsis with hypotension ([facility] [address] [A41.9] --   --    K 3.4  3.4 3.4 3.9  --    *  --   --   --    CO2 23  --   --   --    PHOS 2.4  --   --   --    OSMOCALC 317*  --   --   --      NUTRITION RELATED PHYSICAL FINDINGS:  - Body Fat/Muscle Mass: severe depletion body fat and severe depletion mu

## 2017-06-06 NOTE — ANESTHESIA POSTPROCEDURE EVALUATION
Patient: Oscar Alvarez    Procedure Summary     Date Anesthesia Start Anesthesia Stop Room / Location    06/06/17 1426  [address] [facility] ENDOSCOPY 01 / 300 [facility] ENDOSCOPY       Procedure Diagnosis Surgeon Responsible Provider    PERCUTANEOUS ENDOSCOPIC GASTROSTOMY PLACEMEN

## 2017-06-06 NOTE — OPERATIVE REPORT
ENDOSCOPY OPERATIVE REPORT    Patient Name: Tim Mujica Record #: [id number]  YOB: 1950  Date of Procedure: 6/6/2017    Preoperative Diagnosis: oropharyngeal dysphagia, need for enteral nutrition.    Postoperative Diagnosis:    1 transillumination could not be re-demonstrated. A different site was then selected but transillumination and good digital impression. A small 6 mm incision was then made at this site. A trochar was introduced through the incision.  The trochar was grabbed w

## 2017-06-06 NOTE — H&P
History & Physical Examination    Patient Name: Linsey Mustafa  MRN: [id number]  CSN: [id number]  YOB: 1950    Diagnosis: dysphagia. Present Illness: dysphagia.        Prescriptions prior to admission:  ascorbic acid 100 MG Oral Tab Take 1 sublingual every 2 hours when necessary pain Disp: 30 mL Rfl: 0    LORazepam 2 MG/ML Oral Conc Take 0.25 mL (0.5 mg total) by mouth every 2 (two) hours as needed.  Disp: 30 mL Rfl: 1    Atropine Sulfate 1 % Ophthalmic Solution 1-2 drops SUBLINGUAL- every ho [ ]    OTHER        [ x ] I have discussed the risks and benefits and alternatives with the patient/family. They understand and agree to proceed with plan of care. [ x ] I have reviewed the History and Physical done within the last 30 days.   Any changes

## 2017-06-06 NOTE — OR NURSING
Tolerated 20ft peg tube with minimal bleeding  Binder to site Family contacted for anesthesia consent

## 2017-06-07 VITALS
OXYGEN SATURATION: 96 % | WEIGHT: 136 LBS | HEIGHT: 65 IN | TEMPERATURE: 97 F | RESPIRATION RATE: 20 BRPM | BODY MASS INDEX: 22.66 KG/M2 | SYSTOLIC BLOOD PRESSURE: 105 MMHG | HEART RATE: 89 BPM | DIASTOLIC BLOOD PRESSURE: 55 MMHG

## 2017-06-07 PROCEDURE — 99232 SBSQ HOSP IP/OBS MODERATE 35: CPT | Performed by: INTERNAL MEDICINE

## 2017-06-07 NOTE — PLAN OF CARE
Problem: Patient Centered Care  Goal: Patient preferences are identified and integrated in the patient’s plan of care  Interventions:  - What would you like us to know as we care for you?  Patient’s guardian would like her to be comfortable  - Provide timel neurological status   Outcome: Progressing  No evidence of seizure activity.     Problem: CARDIOVASCULAR - ADULT  Goal: Maintains optimal cardiac output and hemodynamic stability  INTERVENTIONS:  - Monitor vital signs, rhythm, and trends  - Monitor for blee returns to baseline bowel function  INTERVENTIONS:  - Assess bowel function  - Maintain adequate hydration with IV or PO as ordered and tolerated  - Evaluate effectiveness of GI medications  - Encourage mobilization and activity  - Obtain nutritional consu products/factors as ordered and appropriate   Outcome: Progressing  No H&H levels drawn today    Problem: SAFETY ADULT - FALL  Goal: Free from fall injury  INTERVENTIONS:  - Assess pt frequently for physical needs  - Identify cognitive and physical deficit

## 2017-06-07 NOTE — PROGRESS NOTES
[facility]     Progress Note        Gayemc Abiel Patient Status:  Inpatient    1950 MRN [id number]   Location [facility] 2W/SW Attending Roberto Pacheco MD   Hosp Day # 8 PCP Buck Nagel MD, Alen Real MD       Subjective: decubitus ulcer  6.  Acute kidney injury, resolved  7.  Hypernatremia  8.  Hypoalbuminemia  9.  Dysphagia  10.  Lactic acidosis, resolved  11. Gram-negative bacteremia  12.   Transaminitis     Plan   -Patient presents from nursing home with chief complaint

## 2017-06-07 NOTE — PROGRESS NOTES
GI  PROGRESS NOTE    SUBJECTIVE: moans when touched.        OBJECTIVE:  Temp:  [96.1 °F (35.6 °C)-97 °F (36.1 °C)] 96.6 °F (35.9 °C)  Pulse:  [77-90] 86  Resp:  [12-18] 18  BP: (101-129)/(56-97) 113/60 mmHg  Exam  Gen: No acute distress  CV/L: neg  Abd: A

## 2017-06-07 NOTE — PROGRESS NOTES
INFECTIOUS DISEASE PROGRESS NOTE    Yvette York Patient Status:  Inpatient    1950 MRN [id number]   Location [facility] 2W/SW Attending Alonzo Diez MD   Hosp Day # 8 PCP Dhiraj Garcia MD, Sergio Keenan MD     Subjective:  Patient seen and ex by (CST): Walker Rutledge MD on 6/01/2017 at 9:36        Approved by (CST): Walker Rutledge MD on 6/01/2017 at 9:43            =====  CONCLUSION:   1. Hepatomegaly. 2. Status post cholecystectomy. No biliary ductal dilatation. 3. Small amount of ascites.

## 2017-06-07 NOTE — PLAN OF CARE
Problem: Patient Centered Care  Goal: Patient preferences are identified and integrated in the patient’s plan of care  Interventions:  - What would you like us to know as we care for you?  Patient’s guardian would like her to be comfortable  - Provide timel trends  - Monitor for bleeding, hypotension and signs of decreased cardiac output  - Evaluate effectiveness of vasoactive medications to optimize hemodynamic stability  - Monitor arterial and/or venous puncture sites for bleeding and/or hematoma  - Assess routine/schedule  - Consider collaborating with pharmacy to review patient’s medication profile   Outcome: Progressing    Problem: SKIN/TISSUE INTEGRITY - ADULT  Goal: Skin integrity remains intact  INTERVENTIONS  - Assess and document risk factors for pre environment to reduce risk of injury  - Provide assistive devices as appropriate  - Consider OT/PT consult to assist with strengthening/mobility  - Encourage toileting schedule   Outcome: Progressing    Comments:   Received call from guardian.  Provided upd

## 2017-06-07 NOTE — PLAN OF CARE
Problem: Patient Centered Care  Goal: Patient preferences are identified and integrated in the patient’s plan of care  Interventions:  - What would you like us to know as we care for you?  Patient’s guardian would like her to be comfortable  - Provide timel Administer anti-seizure medications as ordered  - Monitor neurological status   Outcome: Adequate for Discharge  No seizure activity noted during shift.     Problem: CARDIOVASCULAR - ADULT  Goal: Maintains optimal cardiac output and hemodynamic stability  I Discharge  Patient on room air.     Problem: GASTROINTESTINAL - ADULT  Goal: Maintains or returns to baseline bowel function  INTERVENTIONS:  - Assess bowel function  - Maintain adequate hydration with IV or PO as ordered and tolerated  - Evaluate effective products/factors, fluids and medications as ordered and appropriate  - Administer supportive blood products/factors as ordered and appropriate   Outcome: Adequate for Discharge    Problem: SAFETY ADULT - FALL  Goal: Free from fall injury  INTERVENTIONS:  -

## 2017-06-07 NOTE — PROGRESS NOTES
[address]    CDI Prediction Tool Protocol (Vancomycin Initiated)    OVP (oral vancomycin prophylaxis) 125 mg PO BID is being started in this patient based on a score of 14.   This patient is currently at high risk for developing CDI due to his/h

## 2017-06-07 NOTE — DISCHARGE SUMMARY
[facility] - [facility]    Discharge Summary    Daniel Wolff Patient Status:  Inpatient    1950 MRN [id number]   Location [facility] 5SW/SE Attending Myriam Tyson MD   Hosp Day # 8 PCP Julio Cesar Colón MD, Mayito Clarke MD     Date of Admissio FE) MG Oral Tab EC  Take 325 mg by mouth 2 (two) times daily with meals. Lidocaine HCl 2 % External Gel  Apply topically daily as needed (to sacrum for pain). Multiple Vitamin (MULTI-DAY VITAMINS) Oral Tab  Take by mouth daily.     OxyCODONE HCl, Abus

## 2017-06-07 NOTE — DISCHARGE PLANNING
2:35pm Update- Primary contact Zee Acosta called back to confirm her number as [phone]. This has been updated on the facesheet. --  2:20pm-SW informed of dc for today. RN to clarify if the picc is needed prior to dc.  Bed confirmed at [facility] sn[facility] for to

## 2019-03-18 ENCOUNTER — APPOINTMENT (OUTPATIENT)
Dept: GENERAL RADIOLOGY | Facility: HOSPITAL | Age: 69
End: 2019-03-18
Attending: EMERGENCY MEDICINE
Payer: MEDICARE

## 2019-03-18 ENCOUNTER — HOSPITAL ENCOUNTER (EMERGENCY)
Facility: HOSPITAL | Age: 69
Discharge: HOME | End: 2019-03-18
Attending: EMERGENCY MEDICINE
Payer: MEDICARE

## 2019-03-18 VITALS
WEIGHT: 136 LBS | RESPIRATION RATE: 17 BRPM | HEART RATE: 89 BPM | DIASTOLIC BLOOD PRESSURE: 51 MMHG | BODY MASS INDEX: 22.66 KG/M2 | SYSTOLIC BLOOD PRESSURE: 107 MMHG | OXYGEN SATURATION: 98 % | TEMPERATURE: 99 F | HEIGHT: 65 IN

## 2019-03-18 DIAGNOSIS — T85.598A FEEDING TUBE DYSFUNCTION, INITIAL ENCOUNTER: Primary | ICD-10-CM

## 2019-03-18 PROCEDURE — 49465 FLUORO EXAM OF G/COLON TUBE: CPT | Performed by: EMERGENCY MEDICINE

## 2019-03-18 PROCEDURE — 99283 EMERGENCY DEPT VISIT LOW MDM: CPT | Performed by: EMERGENCY MEDICINE

## 2019-03-18 NOTE — ED INITIAL ASSESSMENT (HPI)
Pt with 18Fr G tube that she pulled out per [facility]. Pt from [facility]. Pt immediately placed on enteric contact precautions.

## 2019-03-18 NOTE — ED PROVIDER NOTES
Patient Seen in: [facility] Emergency Department    History   Patient presents with:  Cath Tube Problem (gastrointestinal, urinary, integumentary)    Stated Complaint: G tube pulled out per pt.  Pt from [facility]    HPI    79 y/o female from the [facility]: Normal rate, regular rhythm and intact distal pulses. Pulmonary/Chest: Effort normal. No respiratory distress. Abdominal: Soft.   Estevez in place  from the nursing home at the gastrostomy site without surrounding signs of infection or possible for a visit  As needed    We recommend that you schedule follow up care with a primary care provider within the next three months to obtain basic health screening including reassessment of your blood pressure.     Medications Prescribed:  Current D

## 2019-03-18 NOTE — ED NOTES
Pt arrived to ED and 22 Fr garza in place per facility. Unit distribution called for 18Fr. Gtube. Pt states she accidentally pulled 18 Fr. G tube out. Awaiting arrival of g tube for placement. No further concerns.

## 2019-03-23 ENCOUNTER — APPOINTMENT (OUTPATIENT)
Dept: GENERAL RADIOLOGY | Facility: HOSPITAL | Age: 69
End: 2019-03-23
Attending: EMERGENCY MEDICINE
Payer: MEDICARE

## 2019-03-23 ENCOUNTER — HOSPITAL ENCOUNTER (EMERGENCY)
Facility: HOSPITAL | Age: 69
Discharge: NURSING, REHAB OR LONG-TERM CARE | End: 2019-03-23
Attending: EMERGENCY MEDICINE
Payer: MEDICARE

## 2019-03-23 VITALS
RESPIRATION RATE: 17 BRPM | HEART RATE: 98 BPM | SYSTOLIC BLOOD PRESSURE: 124 MMHG | BODY MASS INDEX: 23.9 KG/M2 | OXYGEN SATURATION: 97 % | HEIGHT: 64 IN | WEIGHT: 140 LBS | TEMPERATURE: 99 F | DIASTOLIC BLOOD PRESSURE: 80 MMHG

## 2019-03-23 DIAGNOSIS — R11.10 VOMITING, INTRACTABILITY OF VOMITING NOT SPECIFIED, PRESENCE OF NAUSEA NOT SPECIFIED, UNSPECIFIED VOMITING TYPE: Primary | ICD-10-CM

## 2019-03-23 DIAGNOSIS — R78.89 ELEVATED PHENYTOIN LEVEL: ICD-10-CM

## 2019-03-23 LAB
ANION GAP SERPL CALC-SCNC: 4 MMOL/L (ref 0–18)
BASOPHILS # BLD AUTO: 0.02 X10(3) UL (ref 0–0.2)
BASOPHILS NFR BLD AUTO: 0.5 %
BUN BLD-MCNC: 15 MG/DL (ref 7–18)
BUN/CREAT SERPL: 30 (ref 10–20)
CALCIUM BLD-MCNC: 9.3 MG/DL (ref 8.5–10.1)
CHLORIDE SERPL-SCNC: 102 MMOL/L (ref 98–107)
CO2 SERPL-SCNC: 31 MMOL/L (ref 21–32)
CREAT BLD-MCNC: 0.5 MG/DL (ref 0.55–1.02)
DEPRECATED RDW RBC AUTO: 49.5 FL (ref 35.1–46.3)
EOSINOPHIL # BLD AUTO: 0.18 X10(3) UL (ref 0–0.7)
EOSINOPHIL NFR BLD AUTO: 4.2 %
ERYTHROCYTE [DISTWIDTH] IN BLOOD BY AUTOMATED COUNT: 13.7 % (ref 11–15)
GLUCOSE BLD-MCNC: 83 MG/DL (ref 70–99)
HCT VFR BLD AUTO: 40.2 % (ref 35–48)
HGB BLD-MCNC: 12.7 G/DL (ref 12–16)
IMM GRANULOCYTES # BLD AUTO: 0.01 X10(3) UL (ref 0–1)
IMM GRANULOCYTES NFR BLD: 0.2 %
LYMPHOCYTES # BLD AUTO: 1.33 X10(3) UL (ref 1–4)
LYMPHOCYTES NFR BLD AUTO: 30.8 %
MCH RBC QN AUTO: 31 PG (ref 26–34)
MCHC RBC AUTO-ENTMCNC: 31.6 G/DL (ref 31–37)
MCV RBC AUTO: 98 FL (ref 80–100)
MONOCYTES # BLD AUTO: 0.43 X10(3) UL (ref 0.1–1)
MONOCYTES NFR BLD AUTO: 10 %
NEUTROPHILS # BLD AUTO: 2.35 X10 (3) UL (ref 1.5–7.7)
NEUTROPHILS # BLD AUTO: 2.35 X10(3) UL (ref 1.5–7.7)
NEUTROPHILS NFR BLD AUTO: 54.3 %
OSMOLALITY SERPL CALC.SUM OF ELEC: 284 MOSM/KG (ref 275–295)
PHENYTOIN SERPL-MCNC: 22.1 UG/ML (ref 10–20)
PLATELET # BLD AUTO: 342 10(3)UL (ref 150–450)
POTASSIUM SERPL-SCNC: 4 MMOL/L (ref 3.5–5.1)
RBC # BLD AUTO: 4.1 X10(6)UL (ref 3.8–5.3)
SODIUM SERPL-SCNC: 137 MMOL/L (ref 136–145)
WBC # BLD AUTO: 4.3 X10(3) UL (ref 4–11)

## 2019-03-23 PROCEDURE — 80185 ASSAY OF PHENYTOIN TOTAL: CPT | Performed by: EMERGENCY MEDICINE

## 2019-03-23 PROCEDURE — 80048 BASIC METABOLIC PNL TOTAL CA: CPT | Performed by: EMERGENCY MEDICINE

## 2019-03-23 PROCEDURE — 71045 X-RAY EXAM CHEST 1 VIEW: CPT | Performed by: EMERGENCY MEDICINE

## 2019-03-23 PROCEDURE — 93005 ELECTROCARDIOGRAM TRACING: CPT

## 2019-03-23 PROCEDURE — 99285 EMERGENCY DEPT VISIT HI MDM: CPT

## 2019-03-23 PROCEDURE — 93010 ELECTROCARDIOGRAM REPORT: CPT | Performed by: EMERGENCY MEDICINE

## 2019-03-23 PROCEDURE — 85025 COMPLETE CBC W/AUTO DIFF WBC: CPT | Performed by: EMERGENCY MEDICINE

## 2019-03-23 PROCEDURE — 36415 COLL VENOUS BLD VENIPUNCTURE: CPT

## 2019-03-23 NOTE — ED PROVIDER NOTES
Patient Seen in: [facility] AND [facility] Emergency Department    History   Patient presents with:  Nausea/Vomiting/Diarrhea (gastrointestinal)    Stated Complaint:     HPI    60-year-old female nonverbal at baseline secondary to traumatic brain injury presents Neck: Normal range of motion. Neck supple. Cardiovascular: Normal rate, regular rhythm, normal heart sounds, intact distal pulses and normal pulses. Pulmonary/Chest: Effort normal and breath sounds normal. No respiratory distress. Abdominal: Soft.  No Patient's abdominal exam is benign. Gastric secretions were obtained from her G-tube and tested negative for occult blood. Gastric contents were yellow mucus and tube feedings. Chest x-ray is unremarkable. EKG wnl. CBC and BMP within normal limits.   Pa Vomiting, intractability of vomiting not specified, presence of nausea not specified, unspecified vomiting type  (primary encounter diagnosis)  Elevated phenytoin level    Disposition:  Discharge  3/23/2019  1:21 pm    Follow-up:  Roberto Pacheco MD  1S

## 2019-03-23 NOTE — ED NOTES
Report given to Komal Conner RN at the [place] [address]. Updated on pt's plan of care and return to their facility.

## 2019-07-13 ENCOUNTER — APPOINTMENT (OUTPATIENT)
Dept: GENERAL RADIOLOGY | Facility: HOSPITAL | Age: 69
End: 2019-07-13
Attending: EMERGENCY MEDICINE
Payer: MEDICARE

## 2019-07-13 ENCOUNTER — HOSPITAL ENCOUNTER (EMERGENCY)
Facility: HOSPITAL | Age: 69
Discharge: HOME | End: 2019-07-14
Attending: EMERGENCY MEDICINE
Payer: MEDICARE

## 2019-07-13 DIAGNOSIS — T85.528A DISLODGED GASTROSTOMY TUBE: Primary | ICD-10-CM

## 2019-07-13 PROCEDURE — 43762 RPLC GTUBE NO REVJ TRC: CPT

## 2019-07-13 PROCEDURE — 99284 EMERGENCY DEPT VISIT MOD MDM: CPT

## 2019-07-13 PROCEDURE — 49465 FLUORO EXAM OF G/COLON TUBE: CPT | Performed by: EMERGENCY MEDICINE

## 2019-07-14 VITALS
HEART RATE: 98 BPM | BODY MASS INDEX: 24 KG/M2 | RESPIRATION RATE: 20 BRPM | OXYGEN SATURATION: 98 % | TEMPERATURE: 98 F | DIASTOLIC BLOOD PRESSURE: 74 MMHG | SYSTOLIC BLOOD PRESSURE: 118 MMHG | WEIGHT: 140 LBS

## 2019-07-14 VITALS
DIASTOLIC BLOOD PRESSURE: 91 MMHG | RESPIRATION RATE: 12 BRPM | HEART RATE: 108 BPM | TEMPERATURE: 98 F | OXYGEN SATURATION: 96 % | SYSTOLIC BLOOD PRESSURE: 131 MMHG

## 2019-07-14 DIAGNOSIS — Z43.1 PEG (PERCUTANEOUS ENDOSCOPIC GASTROSTOMY) ADJUSTMENT/REPLACEMENT/REMOVAL (HCC): Primary | ICD-10-CM

## 2019-07-14 PROCEDURE — 99283 EMERGENCY DEPT VISIT LOW MDM: CPT

## 2019-07-14 PROCEDURE — 43762 RPLC GTUBE NO REVJ TRC: CPT

## 2019-07-14 NOTE — ED INITIAL ASSESSMENT (HPI)
Barber Armenta arrives via EMS from the [place]. G tube found to be deflated. Removed at that time

## 2019-07-14 NOTE — ED NOTES
Pt alert. Hx traumatic brain injury, acting to patient baseline, screams when touched by staff, otherwise non-verbal. No respiratory distress. Dr. Enrique East replaced g-tube with 20Fr. Abd soft. Split gauze placed under g-tube.

## 2019-07-14 NOTE — ED PROVIDER NOTES
XR ABDOMEN 2350 hours    Comparison: 3/18/2019    IMPRESSION:    - Gastrostomy tube tip appropriately positioned in the mid gastric body, with positive enteric contrast instilled through the gastrostomy tube opacifying the stomach and proximal small bowel.

## 2019-07-14 NOTE — ED PROVIDER NOTES
Patient Seen in: [facility] AND [facility] Emergency Department    History   Patient presents with:  Cath Tube Problem (gastrointestinal, urinary, integumentary)    Stated Complaint: pulled out g-tube    HPI     Incomplete history due to the patient's baseline. lungs  Abdomen: Soft,  nondistended, Estevez in place  Musculoskeletal: Contractures all extremities. Neuro: Nonverbal.  As above contractures. Withdraws to pain.         ED Course   Labs Reviewed - No data to display       G-tube replaced with 20 [place] fe

## 2019-07-14 NOTE — ED INITIAL ASSESSMENT (HPI)
[facility] Pt pulled out g-tube at 210 per nursing staff. Nursing saff placed 14 garza in hole pta. No bleeding at site.

## 2019-07-14 NOTE — ED PROVIDER NOTES
[facility] Emergency Department    History   Patient presents with:  Cath Tube Problem (gastrointestinal, urinary, integumentary)    Stated Complaint: G tube replacement    HPI    67-year-old female presents from the [address] [place] [address] Neurological: She is alert. Skin: Skin is warm and dry. Psychiatric:   Unable to assess   Nursing note and vitals reviewed.             ED Course   Labs Reviewed - No data to display       G-Tube Replacement Procedure Note:  Prior to procedure documen CONCLUSION:  Grossly satisfactory position of the gastrostomy tube with mild gastroesophageal reflux. A preliminary report was issued by the [address] [place] [address] Radiology teleradiology service. There are no major discrepancies.    Dictated by (CST): SESAR Payne

## 2020-02-14 NOTE — DISCHARGE PLANNING
SW following up on d/c planning for the patient. She is a long term resident of [facility]. She is in CCU and has no d/c plans at this time, but TAYO will arrange transfer back when she is stable.     Senait Caputo [facility]  [id number] Positioning (Leave Blank If You Do Not Want): The patient was placed in a comfortable position exposing the surgical site.

## 2021-02-07 ENCOUNTER — APPOINTMENT (OUTPATIENT)
Dept: GENERAL RADIOLOGY | Facility: HOSPITAL | Age: 71
DRG: 871 | End: 2021-02-07
Attending: EMERGENCY MEDICINE
Payer: MEDICARE

## 2021-02-07 ENCOUNTER — HOSPITAL ENCOUNTER (INPATIENT)
Facility: HOSPITAL | Age: 71
LOS: 4 days | Discharge: NURSING, REHAB OR LONG-TERM CARE | DRG: 871 | End: 2021-02-11
Attending: EMERGENCY MEDICINE | Admitting: INTERNAL MEDICINE
Payer: MEDICARE

## 2021-02-07 DIAGNOSIS — A41.89 SEPSIS DUE TO OTHER ETIOLOGY (HCC): Primary | ICD-10-CM

## 2021-02-07 DIAGNOSIS — R11.10 NON-INTRACTABLE VOMITING, PRESENCE OF NAUSEA NOT SPECIFIED, UNSPECIFIED VOMITING TYPE: ICD-10-CM

## 2021-02-07 LAB
ALBUMIN SERPL-MCNC: 2.5 G/DL (ref 3.4–5)
ALP LIVER SERPL-CCNC: 130 U/L
ALT SERPL-CCNC: 29 U/L
ANION GAP SERPL CALC-SCNC: 5 MMOL/L (ref 0–18)
ANTIBODY SCREEN: NEGATIVE
APTT PPP: 43.4 SECONDS (ref 23.2–35.3)
AST SERPL-CCNC: 69 U/L (ref 15–37)
BASOPHILS # BLD AUTO: 0.02 X10(3) UL (ref 0–0.2)
BASOPHILS NFR BLD AUTO: 0.2 %
BILIRUB DIRECT SERPL-MCNC: 0.2 MG/DL (ref 0–0.2)
BILIRUB SERPL-MCNC: 0.6 MG/DL (ref 0.1–2)
BILIRUB UR QL: NEGATIVE
BUN BLD-MCNC: 23 MG/DL (ref 7–18)
BUN/CREAT SERPL: 31.9 (ref 10–20)
CALCIUM BLD-MCNC: 8.3 MG/DL (ref 8.5–10.1)
CHLORIDE SERPL-SCNC: 104 MMOL/L (ref 98–112)
CO2 SERPL-SCNC: 29 MMOL/L (ref 21–32)
CREAT BLD-MCNC: 0.72 MG/DL
DEPRECATED RDW RBC AUTO: 49.6 FL (ref 35.1–46.3)
EOSINOPHIL # BLD AUTO: 0.01 X10(3) UL (ref 0–0.7)
EOSINOPHIL NFR BLD AUTO: 0.1 %
ERYTHROCYTE [DISTWIDTH] IN BLOOD BY AUTOMATED COUNT: 14 % (ref 11–15)
GLUCOSE BLD-MCNC: 91 MG/DL (ref 70–99)
GLUCOSE UR-MCNC: NEGATIVE MG/DL
HCT VFR BLD AUTO: 37.8 %
HGB BLD-MCNC: 12.2 G/DL
HGB UR QL STRIP.AUTO: NEGATIVE
HYALINE CASTS #/AREA URNS AUTO: 4 /LPF
IMM GRANULOCYTES # BLD AUTO: 0.03 X10(3) UL (ref 0–1)
IMM GRANULOCYTES NFR BLD: 0.3 %
INR BLD: 1.33 (ref 0.9–1.2)
KETONES UR-MCNC: NEGATIVE MG/DL
LACTATE SERPL-SCNC: 1.7 MMOL/L (ref 0.4–2)
LEUKOCYTE ESTERASE UR QL STRIP.AUTO: NEGATIVE
LYMPHOCYTES # BLD AUTO: 1.37 X10(3) UL (ref 1–4)
LYMPHOCYTES NFR BLD AUTO: 13 %
M PROTEIN MFR SERPL ELPH: 6.2 G/DL (ref 6.4–8.2)
MCH RBC QN AUTO: 31.1 PG (ref 26–34)
MCHC RBC AUTO-ENTMCNC: 32.3 G/DL (ref 31–37)
MCV RBC AUTO: 96.4 FL
MONOCYTES # BLD AUTO: 0.65 X10(3) UL (ref 0.1–1)
MONOCYTES NFR BLD AUTO: 6.1 %
NEUTROPHILS # BLD AUTO: 8.49 X10 (3) UL (ref 1.5–7.7)
NEUTROPHILS # BLD AUTO: 8.49 X10(3) UL (ref 1.5–7.7)
NEUTROPHILS NFR BLD AUTO: 80.3 %
NITRITE UR QL STRIP.AUTO: NEGATIVE
OSMOLALITY SERPL CALC.SUM OF ELEC: 289 MOSM/KG (ref 275–295)
PH UR: 5 [PH] (ref 5–8)
PLATELET # BLD AUTO: 174 10(3)UL (ref 150–450)
POTASSIUM SERPL-SCNC: 4.1 MMOL/L (ref 3.5–5.1)
PROT UR-MCNC: 30 MG/DL
PROTHROMBIN TIME: 16.2 SECONDS (ref 11.8–14.5)
RBC # BLD AUTO: 3.92 X10(6)UL
RBC #/AREA URNS AUTO: <1 /HPF
RH BLOOD TYPE: POSITIVE
SODIUM SERPL-SCNC: 138 MMOL/L (ref 136–145)
SP GR UR STRIP: 1.02 (ref 1–1.03)
UROBILINOGEN UR STRIP-ACNC: 2
WBC # BLD AUTO: 10.6 X10(3) UL (ref 4–11)
WBC #/AREA URNS AUTO: 4 /HPF

## 2021-02-07 PROCEDURE — 71045 X-RAY EXAM CHEST 1 VIEW: CPT | Performed by: EMERGENCY MEDICINE

## 2021-02-07 PROCEDURE — 99223 1ST HOSP IP/OBS HIGH 75: CPT | Performed by: INTERNAL MEDICINE

## 2021-02-07 RX ORDER — DEXTROSE AND SODIUM CHLORIDE 5; .9 G/100ML; G/100ML
INJECTION, SOLUTION INTRAVENOUS CONTINUOUS
Status: DISCONTINUED | OUTPATIENT
Start: 2021-02-07 | End: 2021-02-08

## 2021-02-07 RX ORDER — ACETAMINOPHEN 325 MG/1
650 TABLET ORAL EVERY 4 HOURS PRN
Status: DISCONTINUED | OUTPATIENT
Start: 2021-02-07 | End: 2021-02-11

## 2021-02-07 RX ORDER — CLONAZEPAM 0.5 MG/1
0.5 TABLET ORAL 2 TIMES DAILY PRN
Status: DISCONTINUED | OUTPATIENT
Start: 2021-02-07 | End: 2021-02-07 | Stop reason: ALTCHOICE

## 2021-02-07 RX ORDER — DOXEPIN HYDROCHLORIDE 50 MG/1
1 CAPSULE ORAL DAILY
Status: DISCONTINUED | OUTPATIENT
Start: 2021-02-07 | End: 2021-02-10

## 2021-02-07 RX ORDER — MAGNESIUM OXIDE 400 MG (241.3 MG MAGNESIUM) TABLET
400 TABLET DAILY
COMMUNITY

## 2021-02-07 RX ORDER — ACETAMINOPHEN 650 MG/1
650 SUPPOSITORY RECTAL ONCE
Status: COMPLETED | OUTPATIENT
Start: 2021-02-07 | End: 2021-02-07

## 2021-02-07 RX ORDER — CLINDAMYCIN PHOSPHATE 150 MG/ML
600 INJECTION, SOLUTION INTRAVENOUS EVERY 8 HOURS
Status: ON HOLD | COMMUNITY
Start: 2021-02-07 | End: 2021-02-11

## 2021-02-07 RX ORDER — ZINC SULFATE 50(220)MG
110 CAPSULE ORAL 2 TIMES DAILY
Status: DISCONTINUED | OUTPATIENT
Start: 2021-02-07 | End: 2021-02-07 | Stop reason: RX

## 2021-02-07 RX ORDER — ARGININE/ASCORBATE SOD/VITE AC 4.5 G/9.2G
1 POWDER IN PACKET (EA) ORAL 2 TIMES DAILY
Status: DISCONTINUED | OUTPATIENT
Start: 2021-02-07 | End: 2021-02-07 | Stop reason: RX

## 2021-02-07 RX ORDER — PYRIDOXINE HCL (VITAMIN B6) 100 MG
TABLET ORAL 2 TIMES DAILY
COMMUNITY

## 2021-02-07 RX ORDER — ATORVASTATIN CALCIUM 40 MG/1
40 TABLET, FILM COATED ORAL NIGHTLY
COMMUNITY

## 2021-02-07 RX ORDER — GUAIFENESIN/DEXTROMETHORPHAN 100-10MG/5
5 SYRUP ORAL 3 TIMES DAILY PRN
COMMUNITY

## 2021-02-07 RX ORDER — ERGOCALCIFEROL 1.25 MG/1
50000 CAPSULE ORAL WEEKLY
Status: DISCONTINUED | OUTPATIENT
Start: 2021-02-07 | End: 2021-02-11

## 2021-02-07 RX ORDER — POLYETHYLENE GLYCOL 3350 17 G/17G
17 POWDER, FOR SOLUTION ORAL DAILY
COMMUNITY

## 2021-02-07 RX ORDER — PHENYTOIN 125 MG/5ML
100 SUSPENSION ORAL 2 TIMES DAILY
Status: DISCONTINUED | OUTPATIENT
Start: 2021-02-07 | End: 2021-02-11

## 2021-02-07 NOTE — ED NOTES
Received pt awake, responsive to pain, non verbal and at baseline per NH  See initial HPI note for chief complaints  Upon arrival, pt hypoxic on RA sating at 90%.  Placed on 2L NC and sating 100% on O2  Multiple iv placed, labs including 2 sets of BC obtain

## 2021-02-07 NOTE — CONSULTS
[facility] Patient Status:  Emergency   Date of Birth 12/7/1950 MRN [id number]   Location [address] Attending Virgilio Agudelo MD   Hosp Day # 0 PCP Allan Suero MD, Any Bhatia MD Take 5,000 Units by mouth once a week., Start Date , End Date , Taking? , Authorizing Provider External/Patient, Reported    Medication fentaNYL 25 MCG/HR Transdermal Patch 72 Hr, Sig Place 1 patch onto the skin every third day., Start Date , End Date , Ta External/Patient, Reported    Medication Nutritional Supplements (ARGINAID) Oral Powd Pack, Sig Take 1 packet by mouth 2 (two) times daily. , Start Date , End Date , Taking? , Authorizing Provider External/Patient, Reported    Medication Wound Dressings (DE 10.6 02/07/2021    HGB 12.2 02/07/2021    HCT 37.8 02/07/2021    .0 02/07/2021    CREATSERUM 0.72 02/07/2021    BUN 23 02/07/2021     02/07/2021    K 4.1 02/07/2021     02/07/2021    CO2 29.0 02/07/2021    GLU 91 02/07/2021    CA 8.3 02/ Chevy Muhammad MD on 2/07/2021 at 4:37 PM       Assessment:    1. Coffee ground emesis  2. Possible aspiration pneumonia    The patient had two episodes of coffee ground emesis. Her hemoglobin is stable. Stool is brown. G-tube aspirate is negative for blood.  It a

## 2021-02-07 NOTE — ED INITIAL ASSESSMENT (HPI)
Liat Kearney arrived with [facility] from the [place] [address] for c/o vomiting x2 today - \"brown mucus as reported by medics. \" Liat Kearney is non-verbal to baseline. Pt required O2 supplementation en route for saturations of 90%.  Medics report initial BP of 7

## 2021-02-07 NOTE — ED NOTES
Care endorsed to danay lock  Will transport to unit per this RN on all continuous monitors and oxygen    CRN and Nora Carter RN notified of invalid rapid test results x2 occurrences.  Per CRN, continue to contact/droplet precautions

## 2021-02-07 NOTE — ED PROVIDER NOTES
Patient Seen in: [facility] AND [facility] Emergency Department      History   Patient presents with:  Vomiting    Stated Complaint: vomiting    HPI/Subjective:   HPI  69-year-old female with history of traumatic brain injury at age 4, nonverbal communicative st Normal breath sounds. Abdominal:      General: There is no distension.       Comments: g tube in place to L upper abdomen without surrounding signs of infection   Genitourinary:     Comments: Rectal exam: Brown stool, guaiac positive  Musculoskeletal: DIFFERENTIAL WITH PLATELET    Narrative: The following orders were created for panel order CBC WITH DIFFERENTIAL WITH PLATELET.   Procedure                               Abnormality         Status                     --------- bedside monitoring of interventions, collecting and interpreting tests and discussion with consultants but not including time spent performing procedures.                Disposition and Plan     Clinical Impression:  Sepsis due to other etiology ([facility] [address]  (montez

## 2021-02-07 NOTE — H&P
[facility]P - [facility]    History and Physical    Chelsea Hernandez Patient Status:  Emergency    1950 MRN [id number]   Location [address] Attending Virgilio Agudelo MD   Hosp Day # 0 PCP Allan Suero MD, Any Bhatia MD     Jonathan Component Value Date    WBC 10.6 02/07/2021    HGB 12.2 02/07/2021    HCT 37.8 02/07/2021    .0 02/07/2021    CREATSERUM 0.72 02/07/2021    BUN 23 (H) 02/07/2021     02/07/2021    K 4.1 02/07/2021     02/07/2021    CO2 29.0 02/07/2021

## 2021-02-08 ENCOUNTER — APPOINTMENT (OUTPATIENT)
Dept: GENERAL RADIOLOGY | Facility: HOSPITAL | Age: 71
DRG: 871 | End: 2021-02-08
Attending: INTERNAL MEDICINE
Payer: MEDICARE

## 2021-02-08 LAB
ANION GAP SERPL CALC-SCNC: 4 MMOL/L (ref 0–18)
BASOPHILS # BLD AUTO: 0.01 X10(3) UL (ref 0–0.2)
BASOPHILS # BLD AUTO: 0.01 X10(3) UL (ref 0–0.2)
BASOPHILS NFR BLD AUTO: 0.1 %
BASOPHILS NFR BLD AUTO: 0.2 %
BUN BLD-MCNC: 14 MG/DL (ref 7–18)
BUN/CREAT SERPL: 31.1 (ref 10–20)
CALCIUM BLD-MCNC: 8.5 MG/DL (ref 8.5–10.1)
CHLORIDE SERPL-SCNC: 110 MMOL/L (ref 98–112)
CO2 SERPL-SCNC: 27 MMOL/L (ref 21–32)
CREAT BLD-MCNC: 0.45 MG/DL
DEPRECATED RDW RBC AUTO: 50.4 FL (ref 35.1–46.3)
DEPRECATED RDW RBC AUTO: 51.7 FL (ref 35.1–46.3)
EOSINOPHIL # BLD AUTO: 0.11 X10(3) UL (ref 0–0.7)
EOSINOPHIL # BLD AUTO: 0.14 X10(3) UL (ref 0–0.7)
EOSINOPHIL NFR BLD AUTO: 1.4 %
EOSINOPHIL NFR BLD AUTO: 2.9 %
ERYTHROCYTE [DISTWIDTH] IN BLOOD BY AUTOMATED COUNT: 13.8 % (ref 11–15)
ERYTHROCYTE [DISTWIDTH] IN BLOOD BY AUTOMATED COUNT: 14.2 % (ref 11–15)
GLUCOSE BLD-MCNC: 106 MG/DL (ref 70–99)
GLUCOSE BLDC GLUCOMTR-MCNC: 104 MG/DL (ref 70–99)
HCT VFR BLD AUTO: 33 %
HCT VFR BLD AUTO: 33.3 %
HGB BLD-MCNC: 10.6 G/DL
HGB BLD-MCNC: 10.7 G/DL
IMM GRANULOCYTES # BLD AUTO: 0.01 X10(3) UL (ref 0–1)
IMM GRANULOCYTES # BLD AUTO: 0.02 X10(3) UL (ref 0–1)
IMM GRANULOCYTES NFR BLD: 0.2 %
IMM GRANULOCYTES NFR BLD: 0.3 %
LYMPHOCYTES # BLD AUTO: 0.89 X10(3) UL (ref 1–4)
LYMPHOCYTES # BLD AUTO: 1.34 X10(3) UL (ref 1–4)
LYMPHOCYTES NFR BLD AUTO: 17.6 %
LYMPHOCYTES NFR BLD AUTO: 18.2 %
MCH RBC QN AUTO: 31.9 PG (ref 26–34)
MCH RBC QN AUTO: 32 PG (ref 26–34)
MCHC RBC AUTO-ENTMCNC: 32.1 G/DL (ref 31–37)
MCHC RBC AUTO-ENTMCNC: 32.1 G/DL (ref 31–37)
MCV RBC AUTO: 99.4 FL
MCV RBC AUTO: 99.7 FL
MONOCYTES # BLD AUTO: 0.41 X10(3) UL (ref 0.1–1)
MONOCYTES # BLD AUTO: 0.49 X10(3) UL (ref 0.1–1)
MONOCYTES NFR BLD AUTO: 6.4 %
MONOCYTES NFR BLD AUTO: 8.4 %
NEUTROPHILS # BLD AUTO: 3.43 X10 (3) UL (ref 1.5–7.7)
NEUTROPHILS # BLD AUTO: 3.43 X10(3) UL (ref 1.5–7.7)
NEUTROPHILS # BLD AUTO: 5.66 X10 (3) UL (ref 1.5–7.7)
NEUTROPHILS # BLD AUTO: 5.66 X10(3) UL (ref 1.5–7.7)
NEUTROPHILS NFR BLD AUTO: 70.1 %
NEUTROPHILS NFR BLD AUTO: 74.2 %
OSMOLALITY SERPL CALC.SUM OF ELEC: 293 MOSM/KG (ref 275–295)
PLATELET # BLD AUTO: 142 10(3)UL (ref 150–450)
PLATELET # BLD AUTO: 166 10(3)UL (ref 150–450)
POTASSIUM SERPL-SCNC: 3.8 MMOL/L (ref 3.5–5.1)
RBC # BLD AUTO: 3.31 X10(6)UL
RBC # BLD AUTO: 3.35 X10(6)UL
SARS-COV-2 RNA RESP QL NAA+PROBE: NOT DETECTED
SODIUM SERPL-SCNC: 141 MMOL/L (ref 136–145)
WBC # BLD AUTO: 4.9 X10(3) UL (ref 4–11)
WBC # BLD AUTO: 7.6 X10(3) UL (ref 4–11)

## 2021-02-08 PROCEDURE — 71045 X-RAY EXAM CHEST 1 VIEW: CPT | Performed by: INTERNAL MEDICINE

## 2021-02-08 PROCEDURE — 99233 SBSQ HOSP IP/OBS HIGH 50: CPT | Performed by: INTERNAL MEDICINE

## 2021-02-08 RX ORDER — POTASSIUM CHLORIDE 14.9 MG/ML
20 INJECTION INTRAVENOUS ONCE
Status: COMPLETED | OUTPATIENT
Start: 2021-02-08 | End: 2021-02-08

## 2021-02-08 NOTE — CM/SW NOTE
02/08/21 1200   CM/SW Referral Data   Referral Source Physician   Reason for Referral Discharge planning   Patient Status Prior to Admission   Independent with ADLs and Mobility No   Discharge Needs   Anticipated D/C needs Nursing home   Choice of Post-

## 2021-02-08 NOTE — PROGRESS NOTES
GI  PROGRESS NOTE  Notes reviewed. SUBJECTIVE:  Pt nonverbal. Staff report no vomiting/melena/brbpr.        OBJECTIVE:  Temp:  [98.9 °F (37.2 °C)-101.3 °F (38.5 °C)] 98.9 °F (37.2 °C)  Pulse:  [] 99  Resp:  [11-25] 15  BP: ()/(54-73) 125/67

## 2021-02-08 NOTE — CONSULTS
INFECTIOUS DISEASE TELEMEDICINE CONSULT NOTE    Aditya Worthy Patient Status:  Inpatient    1950 MRN [id number]   Location [facility] 2W/SW Attending Daniel Rose, [facility] Se Day # BID  •  Pantoprazole Sodium (PROTONIX) 40 mg in Sodium Chloride (PF) 0.9 % 10 mL IV push, 40 mg, Intravenous, Daily  •  Meropenem (MERREM) 500 mg in sodium chloride 0.9% 100 mL MBP, 500 mg, Intravenous, Q8H  •  dextrose 5 % and 0.9 % NaCl infusion, , Intra other respiratory hygiene measures    Rayo Moss MD  [facility]

## 2021-02-08 NOTE — DIETARY NOTE
ADULT NUTRITION INITIAL ASSESSMENT    Pt is at high nutrition risk. Pt does not meet malnutrition criteria. RECOMMENDATIONS TO MD:  See Nutrition Intervention for tf rx. Once stop IVF resume increased free h20 flushes 175-200 ml q 4 hrs.       Cristian Coy h20: 1348 ml. - % nutrition needs. - Vitamin and mineral supplements: multivitamin/mineral, iron, vitamin c and vitamin D PTA. Now on vitamin D and multivitamin.  .   - Coordination of nutrition care: collaboration with other providers  - Discharge 8.5    141   K 4.1 3.8    110   CO2 29.0 27.0   OSMOCALC 289 293       NUTRITION RELATED PHYSICAL FINDINGS:  - Body Fat/Muscle Mass: well nourished per visual exam.  - Fluid Accumulation: none per RN documentation per visual exam  - Skin Integr

## 2021-02-08 NOTE — PROGRESS NOTES
Pharmacy Note: Dietary Supplement Discontinuation Per Policy    Zinc sulfate has been discontinued on Daniel New per policy. This supplement may be restarted upon discharge using the medication reconciliation process.     Thank you,   Flores Rehman, Ph Verified Results  HEMOGLOBIN A1C GLYCOSYLATED 03Jan2018 09:04AM CINDY GOODRICH   [Jan 4, 2018 9:56AM CINDY GOODRICH]  call and tell labs are OK no change FU in 6 mo     Test Name Result Flag Reference   HEMOGLOBIN A1C GLYH 6.1 % H 4.5-5.6   ----DIABETIC SCREENING---  NON DIABETIC                 <5.7%  INCREASED RISK                5.7-6.4%  DIAGNOSTIC FOR DIABETES      >6.4%     ----DIABETIC CONTROL---     A1C%           eAG mg/dL  6.0            126  6.5            140  7.0            154  7.5            169  8.0            183  8.5            197  9.0            212  9.5            226  10.0           240       Message   call and tell labs are OK no change FU in 6 mo

## 2021-02-08 NOTE — PROGRESS NOTES
[facility]    Progress Note    Lubna Beebe Patient Status:  Inpatient    1950 MRN [id number]   Location [facility] 2W/SW Attending Roberto Pacheco MD   Hosp Day # 1 PCP Buck Nagel MD, Alen Real MD        Subjective: clinical setting. Mild atelectasis or a new area of scarring are considerations. A follow-up chest x-ray is recommended in 1 month after completion of therapy to confirm resolution and exclude an underlying mass.      Dictated by (CST): Alexandra Goodell,

## 2021-02-08 NOTE — PROGRESS NOTES
[facility]    Progress Note    Oscar Alvarez Patient Status:  Inpatient    1950 MRN [id number]   Location [facility] 2W/SW Attending Sherif Carballo MD   Hosp Day # 1 PCP Jeffery Patel MD, Mohamud Weldon MD        Subjective: Xr Chest Ap Portable  (cpt=71045)    Result Date: 2/8/2021  CONCLUSION:  1. Right perihilar and upper lobe pneumonia without significant change.     Dictated by (CST): Anselmo Barthel, MD on 2/08/2021 at 7:47 AM     Finalized by (CST): Anselmo Barthel, MD

## 2021-02-08 NOTE — SEPSIS REASSESSMENT
[facility]    Sepsis Reassessment Note    /64 (BP Location: Left arm)   Pulse 118   Temp 99.8 °F (37.7 °C) (Rectal)   Resp 20   Wt 150 lb (68 kg)   SpO2 96%   BMI 25.75 kg/m²      6:35 PM    Cardiac:  Regularity: Regular  Rate: Tach

## 2021-02-08 NOTE — CONSULTS
[facility]    Report of Consultation    Isaiah Vega Patient Status:  Inpatient    1950 MRN [id number]   Location [facility] 2W/SW Attending Gisselle Marinelli MD   Hosp Day # 0 PCP Austyn Matute MD, Evangelista Love MD     Date of Admi Oral, BID PRN    •  collagenase (SANTYL) 250 UNIT/GM ointment, , Topical, Daily    •  ergocalciferol 1.25 MG (93853 UT) cap 50,000 Units, 50,000 Units, Oral, Weekly    •  Multi-Day Vitamins TABS, , Oral, Daily    •  Arginaid PACK 1 packet, 1 packet, Oral, daily.    •  Wound Dressings [facility]) External Gel, Apply 1 Application topically daily.     •  morphINE Sulfate, Concentrate, 20 MG/ML Oral Solution, 5 mg = 0.25 mL sublingual every 2 hours when necessary pain    •  LORazepam 2 MG/ML Oral Conc, Take 0.25 are considerations. A follow-up chest x-ray is recommended in 1 month after completion of therapy to confirm resolution and exclude an underlying mass.      Dictated by (CST): Titi Gonsales MD on 2/07/2021 at 4:35 PM     Finalized by (CST): [place],

## 2021-02-08 NOTE — PLAN OF CARE
Problem: SKIN/TISSUE INTEGRITY - ADULT  Goal: Skin integrity remains intact  Description: INTERVENTIONS  - Assess and document risk factors for pressure ulcer development  - Assess and document skin integrity  - Monitor for areas of redness and/or skin b rhythm, and trends  - Monitor for bleeding, hypotension and signs of decreased cardiac output  - Evaluate effectiveness of vasoactive medications to optimize hemodynamic stability  - Monitor arterial and/or venous puncture sites for bleeding and/or hematom nonpharmacologic comfort measures as appropriate  - Advance diet as tolerated, if ordered  - Obtain nutritional consult as needed  - Evaluate fluid balance  Outcome: Progressing         Patient non verbal. NPO maintained. No further emesis noted.  Carmen Coffey

## 2021-02-08 NOTE — PROGRESS NOTES
Pharmacy Note: Dietary Supplement Discontinuation Per Policy    Vitamin C has been discontinued on Isaiah Gary per policy. This supplement may be restarted upon discharge using the medication reconciliation process.     Thank you,   Sophia Pereira, Pharm

## 2021-02-09 LAB
ANION GAP SERPL CALC-SCNC: 5 MMOL/L (ref 0–18)
BUN BLD-MCNC: 5 MG/DL (ref 7–18)
BUN/CREAT SERPL: 13.2 (ref 10–20)
CALCIUM BLD-MCNC: 9 MG/DL (ref 8.5–10.1)
CHLORIDE SERPL-SCNC: 110 MMOL/L (ref 98–112)
CO2 SERPL-SCNC: 29 MMOL/L (ref 21–32)
CREAT BLD-MCNC: 0.38 MG/DL
GLUCOSE BLD-MCNC: 118 MG/DL (ref 70–99)
GLUCOSE BLDC GLUCOMTR-MCNC: 110 MG/DL (ref 70–99)
GLUCOSE BLDC GLUCOMTR-MCNC: 112 MG/DL (ref 70–99)
GLUCOSE BLDC GLUCOMTR-MCNC: 93 MG/DL (ref 70–99)
HAV IGM SER QL: 2.1 MG/DL (ref 1.6–2.6)
OSMOLALITY SERPL CALC.SUM OF ELEC: 296 MOSM/KG (ref 275–295)
PHOSPHATE SERPL-MCNC: 1.9 MG/DL (ref 2.5–4.9)
POTASSIUM SERPL-SCNC: 3.5 MMOL/L (ref 3.5–5.1)
POTASSIUM SERPL-SCNC: 3.8 MMOL/L (ref 3.5–5.1)
SODIUM SERPL-SCNC: 144 MMOL/L (ref 136–145)

## 2021-02-09 PROCEDURE — 99233 SBSQ HOSP IP/OBS HIGH 50: CPT | Performed by: INTERNAL MEDICINE

## 2021-02-09 RX ORDER — HYDRALAZINE HYDROCHLORIDE 20 MG/ML
10 INJECTION INTRAMUSCULAR; INTRAVENOUS EVERY 6 HOURS PRN
Status: DISCONTINUED | OUTPATIENT
Start: 2021-02-09 | End: 2021-02-11

## 2021-02-09 RX ORDER — POTASSIUM CHLORIDE 1.5 G/1.77G
40 POWDER, FOR SOLUTION ORAL EVERY 4 HOURS
Status: COMPLETED | OUTPATIENT
Start: 2021-02-09 | End: 2021-02-09

## 2021-02-09 RX ORDER — POTASSIUM CHLORIDE 1.5 G/1.77G
40 POWDER, FOR SOLUTION ORAL ONCE
Status: COMPLETED | OUTPATIENT
Start: 2021-02-09 | End: 2021-02-09

## 2021-02-09 NOTE — PROGRESS NOTES
[facility]    [facility] Note    Heavenly Montalvo Patient Status:  Inpatient    1950 MRN [id number]   Location [facility] 2W/SW Attending Ray Loya MD   Hosp Day # 2 PCP Jaye Crook MD, Milly Crowley MD        Subjective:

## 2021-02-09 NOTE — PROGRESS NOTES
INFECTIOUS DISEASE TELEMEDICINE PROGRESS NOTE    Chela Lilly Patient Status:  Inpatient    1950 MRN [id number]   Location [facility] 2W/SW Attending Christina López, [facility] Impression:  Patient Active Problem List:     Hospice care patient     Sepsis with hypotension ([facility] [address]     Gastrointestinal hemorrhage, unspecified gastrointestinal hemorrhage type     Sepsis due to other etiology ([facility] [address]     Non-intractable vomiting, pres

## 2021-02-09 NOTE — PLAN OF CARE
Note: Kanika See remains neurologically stable. She appeared to be in distress at the start of the shift, crying out and moaning. Tylenol was given to see if pain was the source of the behavior. She still cries out with repositioning and other movement. Utilize distraction and/or relaxation techniques  - Monitor for opioid side effects  - Notify MD/LIP if interventions unsuccessful or patient reports new pain  - Anticipate increased pain with activity and pre-medicate as appropriate  Outcome: Progressing minimize respiratory effort  - Oxygen supplementation based on oxygen saturation or ABGs  - Provide Smoking Cessation handout, if applicable  - Encourage broncho-pulmonary hygiene including cough, deep breathe, Incentive Spirometry  - Assess the need for s

## 2021-02-09 NOTE — PAYOR COMM NOTE
Appropriate for inpatient status per guidelines for vomiting and hypotension due to asp pneumonia in a TBI patient.       --------------  ADMISSION REVIEW     Payor: [address] [place] [address] #:  [id number]  Authorization Number: [id number] Musculoskeletal: Neck supple. Cardiovascular:      Rate and Rhythm: Regular rhythm. Tachycardia present. Pulses: Normal pulses. Pulmonary:      Effort: Pulmonary effort is normal. No respiratory distress. Breath sounds: Normal breath sounds. following components:    RDW-SD 49.6 (*)     Neutrophil Absolute Prelim 8.49 (*)     Neutrophil Absolute 8.49 (*)     All other components within normal limits   LACTIC ACID, PLASMA - Normal   RAPID SARS-COV-2 BY PCR - Normal   ABORH (BLOOD TYPE)   ANTIBOD Unable to perform ROS      Physical Exam:   Vital Signs:  Blood pressure 106/69, pulse 115, temperature (!) 101.3 °F (38.5 °C), temperature source Rectal, resp. rate 11, weight 150 lb (68 kg), SpO2 95 %.      Cardiovascular: Normal rate and regular rhyt consult   O2 therapy           2/7 GI    Assessment:     1. Coffee ground emesis  2. Possible aspiration pneumonia     The patient had two episodes of coffee ground emesis. Her hemoglobin is stable. Stool is brown. G-tube aspirate is negative for blood.  It will repeat Hb later today -> if remains stable, to restart TF             2/8 INF DIS    ASSESSMENT:     1. Multifocal aspiration pneumonia  2. Rule out COVID-19 infection  3. Sepsis on admission  4. Dysphagia s/p G-tube  5. Traumatic brain injury  6.  [employer]

## 2021-02-09 NOTE — PROGRESS NOTES
GI  PROGRESS NOTE  Notes reviewed. SUBJECTIVE:  Tolerating TF per staff. No bm's.        OBJECTIVE:  Temp:  [96.2 °F (35.7 °C)-97.1 °F (36.2 °C)] 97 °F (36.1 °C)  Pulse:  [] 105  Resp:  [10-24] 19  BP: (118-161)/() 136/80  Exam  Gen: No acut

## 2021-02-09 NOTE — PROGRESS NOTES
[facility]    Progress Note    Rah Linares Patient Status:  Inpatient    1950 MRN [id number]   Location [facility] 2W/SW Attending Rachel Bloom MD   Hosp Day # 2 PCP Jf Ward MD, Isiah Burks MD        Subjective: 02/07/2021    ALT 29 02/07/2021    PTT 43.4 (H) 02/07/2021    INR 1.33 (H) 02/07/2021    MG 2.1 02/09/2021    PHOS 1.9 (L) 02/09/2021    TROP 0.03 05/30/2017       Xr Chest Ap Portable  (cpt=71045)    Result Date: 2/8/2021  CONCLUSION:  1.  Right perihilar

## 2021-02-10 ENCOUNTER — APPOINTMENT (OUTPATIENT)
Dept: PICC SERVICES | Facility: HOSPITAL | Age: 71
DRG: 871 | End: 2021-02-10
Attending: PHYSICIAN ASSISTANT
Payer: MEDICARE

## 2021-02-10 LAB
GLUCOSE BLDC GLUCOMTR-MCNC: 103 MG/DL (ref 70–99)
GLUCOSE BLDC GLUCOMTR-MCNC: 125 MG/DL (ref 70–99)
GLUCOSE BLDC GLUCOMTR-MCNC: 71 MG/DL (ref 70–99)
GLUCOSE BLDC GLUCOMTR-MCNC: 75 MG/DL (ref 70–99)
GLUCOSE BLDC GLUCOMTR-MCNC: 91 MG/DL (ref 70–99)
GLUCOSE BLDC GLUCOMTR-MCNC: 99 MG/DL (ref 70–99)
POTASSIUM SERPL-SCNC: 4.3 MMOL/L (ref 3.5–5.1)

## 2021-02-10 PROCEDURE — 05HF33Z INSERTION OF INFUSION DEVICE INTO LEFT CEPHALIC VEIN, PERCUTANEOUS APPROACH: ICD-10-PCS | Performed by: INTERNAL MEDICINE

## 2021-02-10 PROCEDURE — 99232 SBSQ HOSP IP/OBS MODERATE 35: CPT | Performed by: PHYSICIAN ASSISTANT

## 2021-02-10 RX ORDER — LIDOCAINE HYDROCHLORIDE 10 MG/ML
5 INJECTION, SOLUTION EPIDURAL; INFILTRATION; INTRACAUDAL; PERINEURAL ONCE
Status: COMPLETED | OUTPATIENT
Start: 2021-02-10 | End: 2021-02-10

## 2021-02-10 NOTE — DIETARY NOTE
Follow up Nutrition Note       Followed up. Tolerating TF jevity 1.5 @ goal rate 50ml/hr. Noted IV fluid discontinued 2/8, hence will increase free water flushes from 60 to 150 ml q 4 hrs  for hydration maintenance. Labs reviewed.  Will benefit from [facility]

## 2021-02-10 NOTE — PLAN OF CARE
Pt remains resting comfortably in bed. Pt is nonverbal; groans and moans throughout shift. Pt is contracted; unable to move extremities; nonverbal. Cries out when repositioned. On RA. Remains afebrile throughout shift.  Pt NST/elevated BP at times; MDs awar Term Goal[facility]    Interventions:  - Follow MD orders  -Follow medication regimen  - See additional Care Plan goals for specific interventions  Outcome: Progressing  Goal: Patient/Family Short Term Goal  Description: Patient's Short Term Assess the need for suctioning and perform as needed  - Assess and instruct to report SOB or any respiratory difficulty  - Respiratory Therapy support as indicated  - Manage/alleviate anxiety  - Monitor for signs/symptoms of CO2 retention  Outcome: Maki

## 2021-02-10 NOTE — PROGRESS NOTES
[facility]    Progress Note    José Miguel Ferguson Patient Status:  Inpatient    1950 MRN [id number]   Location [facility] 5SW/SE Attending Cristy Sheldon MD   Hosp Day # 3 PCP Adele Murry MD, Freda Yuan MD        Subjective:   Tabatha Sales CREATSERUM 0.38 (L) 02/09/2021    BUN 5 (L) 02/09/2021     02/09/2021    K 4.3 02/10/2021     02/09/2021    CO2 29.0 02/09/2021     (H) 02/09/2021    CA 9.0 02/09/2021    ALB 2.5 (L) 02/07/2021    ALKPHO 130 02/07/2021    BILT 0.6 02/07/

## 2021-02-10 NOTE — PROGRESS NOTES
INFECTIOUS DISEASE PROGRESS NOTE  [facility]ESS [facility]    Phill Hooker Patient Status:  Inpatient    1950 MRN [id number]   Location [facility] 5SW/SE Attending Gabrielle Pablo MD   Hosp Day # 3 PCP [facility] 72year old female with a history of traumatic brain injury at age 4, seizure disorder, hypertension, dysphagia s/p G-tube who was admitted from [address] [place] [address] with episode of respiratory distress and low blood pressure after emesis.  Patient is non-verbal due t

## 2021-02-10 NOTE — PLAN OF CARE
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 Pt has old scarring on legs. Double skin check performed with BRANDON Nicholas.

## 2021-02-10 NOTE — CM/SW NOTE
Per nsg rounds pt is on IV abx, ID consulted. DC abx plan pending. Reserved to return to [facility] at [facility]. Updates sent  via [facility]in.     PLAN  Return to [facility] when medically cleared    2/11 1100  MDO for dc    Return to [facility]

## 2021-02-10 NOTE — PLAN OF CARE
Patient is nonverbal, contracted and unable to move extremities at baseline. On room air, max assist - turn and reposition. Resting comfortably throughout the night. Moans and groans with repositioning. Jazzy Eastern as ordered.  K+ replaced per protoco INTERVENTIONS:  - Monitor vital signs, rhythm, and trends  - Monitor for bleeding, hypotension and signs of decreased cardiac output  - Evaluate effectiveness of vasoactive medications to optimize hemodynamic stability  - Monitor arterial and/or venous pun antiemetic medications  - Provide nonpharmacologic comfort measures as appropriate  - Advance diet as tolerated, if ordered  - Obtain nutritional consult as needed  - Evaluate fluid balance  Outcome: Progressing     Problem: Patient Centered Care  Goal: Pa

## 2021-02-11 VITALS
RESPIRATION RATE: 19 BRPM | OXYGEN SATURATION: 98 % | DIASTOLIC BLOOD PRESSURE: 88 MMHG | SYSTOLIC BLOOD PRESSURE: 130 MMHG | BODY MASS INDEX: 24.1 KG/M2 | WEIGHT: 141.13 LBS | HEIGHT: 64 IN | TEMPERATURE: 98 F | HEART RATE: 117 BPM

## 2021-02-11 LAB
ANION GAP SERPL CALC-SCNC: 4 MMOL/L (ref 0–18)
BUN BLD-MCNC: 10 MG/DL (ref 7–18)
BUN/CREAT SERPL: 25 (ref 10–20)
CALCIUM BLD-MCNC: 8.7 MG/DL (ref 8.5–10.1)
CHLORIDE SERPL-SCNC: 109 MMOL/L (ref 98–112)
CO2 SERPL-SCNC: 29 MMOL/L (ref 21–32)
CREAT BLD-MCNC: 0.4 MG/DL
GLUCOSE BLD-MCNC: 109 MG/DL (ref 70–99)
GLUCOSE BLDC GLUCOMTR-MCNC: 105 MG/DL (ref 70–99)
GLUCOSE BLDC GLUCOMTR-MCNC: 109 MG/DL (ref 70–99)
GLUCOSE BLDC GLUCOMTR-MCNC: 80 MG/DL (ref 70–99)
OSMOLALITY SERPL CALC.SUM OF ELEC: 294 MOSM/KG (ref 275–295)
PHOSPHATE SERPL-MCNC: 2.8 MG/DL (ref 2.5–4.9)
POTASSIUM SERPL-SCNC: 4 MMOL/L (ref 3.5–5.1)
SODIUM SERPL-SCNC: 142 MMOL/L (ref 136–145)

## 2021-02-11 NOTE — PROGRESS NOTES
[facility]    Progress Note    Kings Davila Patient Status:  Inpatient    1950 MRN [id number]   Location [facility] 2W/SW Attending Jabari Rodriguez MD   Hosp Day # 3 PCP Tonja Wheatley MD, Selene Welsh MD        Subjective:

## 2021-02-11 NOTE — PROGRESS NOTES
Discharge RN Summary: Patient has discharge order in. Patient to discharge to the [facility]. Midline in place for antibiotics upon discharge. Anshu) updated on discharge plan. Discharge packet and PCS prepared by Juventino Gan.

## 2021-02-11 NOTE — PROGRESS NOTES
GI  PROGRESS NOTE    SUBJECTIVE:  Staff report no difficulties with TF; no sig residuals.        OBJECTIVE:  Temp:  [97.8 °F (36.6 °C)-98.3 °F (36.8 °C)] 98.1 °F (36.7 °C)  Pulse:  [102-108] 105  Resp:  [14-16] 14  BP: (136-150)/(65-82) 138/82  Exam  Gen:

## 2021-02-11 NOTE — PLAN OF CARE
VSS. Drowsy throughout shift. Tolerating tube feeding. Flushes increased per nutrition. Repositioned throughout shift.    Problem: SKIN/TISSUE INTEGRITY - ADULT  Goal: Skin integrity remains intact  Description: INTERVENTIONS  - Assess and document risk fac and hemodynamic stability  Description: INTERVENTIONS:  - Monitor vital signs, rhythm, and trends  - Monitor for bleeding, hypotension and signs of decreased cardiac output  - Evaluate effectiveness of vasoactive medications to optimize hemodynamic stabili ordered  - Evaluate effectiveness of ordered antiemetic medications  - Provide nonpharmacologic comfort measures as appropriate  - Advance diet as tolerated, if ordered  - Obtain nutritional consult as needed  - Evaluate fluid balance  Outcome: Progressing

## 2021-02-11 NOTE — PLAN OF CARE
Problem: SKIN/TISSUE INTEGRITY - ADULT  Goal: Skin integrity remains intact  Description: INTERVENTIONS  - Assess and document risk factors for pressure ulcer development  - Assess and document skin integrity  - Monitor for areas of redness and/or skin b hypotension and signs of decreased cardiac output  - Evaluate effectiveness of vasoactive medications to optimize hemodynamic stability  - Monitor arterial and/or venous puncture sites for bleeding and/or hematoma  - Assess quality of pulses, skin color an Advance diet as tolerated, if ordered  - Obtain nutritional consult as needed  - Evaluate fluid balance  Outcome: Completed     Problem: Patient Centered Care  Goal: Patient preferences are identified and integrated in the patient's plan of care  [address] [facility]

## 2021-02-11 NOTE — DISCHARGE SUMMARY
[facility]    Discharge Summary    Aditya Worthy Patient Status:  Inpatient    1950 MRN [id number]   Location [facility] 5SW/SE Attending Daniel Rose MD   Hosp Day # 4 PCP Macy Gurllon MD, Natalio Steen MD     Date of Admissio 500 mg  Inject 500 mg into the vein every 8 (eight) hours for 6 days. vancomycin HCl 50 MG/ML Oral Recon Soln  2.5 mL (125 mg total) by Per G Tube route daily for 12 days.       Home Meds - Unchanged    guaiFENesin--10 MG/5ML Oral Syrup  Take 5 mL

## 2021-02-11 NOTE — PLAN OF CARE
Patient is nonverbal, will moan with repositioning. On g-tube feeds- tolerating well. Placed on seizure precautions d/t hx of seizures. L arm precautions d/t midline placement. Frequent rounding done d/t patients inability to call.    HOB 30 degrees interventions  2/11/2021 0221 by Massiel Huston RN  Outcome: Progressing  2/11/2021 0212 by Massiel Huston RN  Outcome: Progressing     Problem: CARDIOVASCULAR - ADULT  Goal: Maintains optimal cardiac output and hemodynamic stability  Description: Berna Boas ADULT  Goal: Minimal or absence of nausea and vomiting  Description: INTERVENTIONS:  - Maintain adequate hydration with IV or PO as ordered and tolerated  - Nasogastric tube to low intermittent suction as ordered  - Evaluate effectiveness of ordered antiem development  - Assess and document skin integrity  - Monitor for areas of redness and/or skin breakdown  - Initiate interventions, skin care algorithm/standards of care as needed  2/11/2021 0221 by Jon Izquierdo RN  Outcome: Not Progressing  2/11/2021 021

## 2021-02-12 NOTE — PAYOR COMM NOTE
--------------  DISCHARGE REVIEW    Payor: [facility] [address] #:  [id number]  Authorization Number: [id number]    Admit date: 2/7/21  Admit time:  1806  Discharge Date: 2/11/2021  1:29 PM     Admitting Physician: MD ANAYELI Vences Sana Narayan MD Consulting Physician  PULMONARY DISEASES    Sadaf Bradley MD Consulting Physician  INFECTIOUS DISEASES    Wyline Ormond, MD Consulting Physician  GASTROENTEROLOGY    Babs Tapia MD Consulting Physician  GASTROENTEROLOGY acetaminophen 325 MG Oral Tab  Take 650 mg by mouth every 4 (four) hours as needed for Pain. !! acetaminophen 325 MG Oral Tab  Take 650 mg by mouth daily.     Atropine Sulfate 1 % Ophthalmic Solution  1-2 drops SUBLINGUAL- every hour when necessary for e

## 2021-08-08 ENCOUNTER — HOSPITAL ENCOUNTER (EMERGENCY)
Facility: HOSPITAL | Age: 71
Discharge: HOME | End: 2021-08-09
Attending: EMERGENCY MEDICINE
Payer: MEDICARE

## 2021-08-08 ENCOUNTER — APPOINTMENT (OUTPATIENT)
Dept: GENERAL RADIOLOGY | Facility: HOSPITAL | Age: 71
End: 2021-08-08
Attending: EMERGENCY MEDICINE
Payer: MEDICARE

## 2021-08-08 DIAGNOSIS — T85.528A DISLODGED GASTROSTOMY TUBE: Primary | ICD-10-CM

## 2021-08-08 PROCEDURE — 49465 FLUORO EXAM OF G/COLON TUBE: CPT | Performed by: EMERGENCY MEDICINE

## 2021-08-08 PROCEDURE — 43762 RPLC GTUBE NO REVJ TRC: CPT

## 2021-08-08 PROCEDURE — 99283 EMERGENCY DEPT VISIT LOW MDM: CPT

## 2021-08-09 VITALS
SYSTOLIC BLOOD PRESSURE: 133 MMHG | DIASTOLIC BLOOD PRESSURE: 76 MMHG | WEIGHT: 141.13 LBS | TEMPERATURE: 99 F | HEART RATE: 102 BPM | OXYGEN SATURATION: 100 % | BODY MASS INDEX: 24 KG/M2 | RESPIRATION RATE: 18 BRPM

## 2021-08-09 NOTE — ED QUICK NOTES
Pt soiled self of bowel and bladder. Pt changed. Robyn care completed with assistance of superior bls transporters and pct. Clean linens and gown applied. Pt tolerated well.

## 2021-08-09 NOTE — ED PROVIDER NOTES
Patient Seen in: [facility] Emergency Department      History   Patient presents with:  Cath Tube Problem    Stated Complaint: g tube pulled    HPI/Subjective:   HPI    71 y/o female w/ a h/o TBI here for dislodged g-tube.   Pt nonverbal.  Unsure w note and vitals reviewed. Differential diagnosis includes dislodged gastrostomy tube.       ED Course   Labs Reviewed - No data to display       X-RAY G-TUBE INJECTION 4 VIEWS 2217 HOURS      IMPRESSION:  -G-tube is appropriately positioned within the st

## 2021-09-22 NOTE — PROGRESS NOTES
Informed Crystal of results   [facility]    Progress Note    Melanie Raymond Patient Status:  Inpatient    1950 MRN [id number]   Location [facility] 5SW/SE Attending Chaparro Wei MD   Hosp Day # 4 PCP Abraham Campos MD, Karrie Mcneil MD     Subjective:     Yris Paige

## 2021-11-02 NOTE — PLAN OF CARE
Problem: Patient Centered Care  Goal: Patient preferences are identified and integrated in the patient’s plan of care  Interventions:  - What would you like us to know as we care for you?  Patient’s guardian would like her to be comfortable  - Provide timel hemodynamic stability  INTERVENTIONS:  - Monitor vital signs, rhythm, and trends  - Monitor for bleeding, hypotension and signs of decreased cardiac output  - Evaluate effectiveness of vasoactive medications to optimize hemodynamic stability  - Monitor art Coxsackie virus infection medications  - Encourage mobilization and activity  - Obtain nutritional consult as needed  - Establish a toileting routine/schedule  - Consider collaborating with pharmacy to review patient’s medication profile   Outcome: Progressing    Problem: SKIN/TISS assessment.  - Educate pt/family on patient safety including physical limitations  - Instruct pt to call for assistance with activity based on assessment  - Modify environment to reduce risk of injury  - Provide assistive devices as appropriate  - Consider

## 2022-01-06 ENCOUNTER — HOSPITAL ENCOUNTER (EMERGENCY)
Facility: HOSPITAL | Age: 72
Discharge: HOME | End: 2022-01-06
Attending: EMERGENCY MEDICINE
Payer: MEDICARE

## 2022-01-06 ENCOUNTER — APPOINTMENT (OUTPATIENT)
Dept: GENERAL RADIOLOGY | Facility: HOSPITAL | Age: 72
End: 2022-01-06
Attending: EMERGENCY MEDICINE
Payer: MEDICARE

## 2022-01-06 VITALS
HEART RATE: 88 BPM | OXYGEN SATURATION: 99 % | TEMPERATURE: 98 F | DIASTOLIC BLOOD PRESSURE: 85 MMHG | SYSTOLIC BLOOD PRESSURE: 132 MMHG | RESPIRATION RATE: 20 BRPM

## 2022-01-06 DIAGNOSIS — Z43.1 ATTENTION TO G-TUBE (HCC): Primary | ICD-10-CM

## 2022-01-06 PROCEDURE — 43762 RPLC GTUBE NO REVJ TRC: CPT

## 2022-01-06 PROCEDURE — 49465 FLUORO EXAM OF G/COLON TUBE: CPT | Performed by: EMERGENCY MEDICINE

## 2022-01-06 PROCEDURE — 99283 EMERGENCY DEPT VISIT LOW MDM: CPT

## 2022-01-06 NOTE — ED QUICK NOTES
Seen by dr Foster Phalemily tube replaced  Pt tolerated well  Pending radiology to confirm placement

## 2022-01-06 NOTE — ED PROVIDER NOTES
Patient Seen in: [facility] AND [facility] Emergency Department      History   Patient presents with:  Cath Tube Problem    Stated Complaint:     Subjective:   HPI    30-year-old female with history as listed below reportedly had her G-tube follow-up today at th (CST): Heidi Carrillo MD on 1/06/2022 at 1:06 PM     Finalized by (CST): Heidi Carrillo MD on 1/06/2022 at 1:11 PM                                        Disposition and Plan     Clinical Impression:  Attention to G-tube [facility])  (primary encounter

## 2022-04-02 ENCOUNTER — LAB REQUISITION (OUTPATIENT)
Dept: LAB | Age: 72
End: 2022-04-02

## 2022-04-02 LAB
ALBUMIN SERPL-MCNC: 2.5 G/DL (ref 3.6–5.1)
ALBUMIN/GLOB SERPL: 0.9 {RATIO} (ref 1–2.4)
ALP SERPL-CCNC: 174 UNITS/L (ref 45–117)
ALT SERPL-CCNC: 23 UNITS/L
ANION GAP SERPL CALC-SCNC: 7 MMOL/L (ref 10–20)
AST SERPL-CCNC: 21 UNITS/L
BASOPHILS # BLD: 0 K/MCL (ref 0–0.3)
BASOPHILS NFR BLD: 0 %
BILIRUB SERPL-MCNC: 0.1 MG/DL (ref 0.2–1)
BUN SERPL-MCNC: 18 MG/DL (ref 6–20)
BUN/CREAT SERPL: 40 (ref 7–25)
CALCIUM SERPL-MCNC: 8.5 MG/DL (ref 8.4–10.2)
CHLORIDE SERPL-SCNC: 106 MMOL/L (ref 98–107)
CO2 SERPL-SCNC: 30 MMOL/L (ref 21–32)
CREAT SERPL-MCNC: 0.45 MG/DL (ref 0.51–0.95)
DEPRECATED RDW RBC: 48.7 FL (ref 39–50)
EOSINOPHIL # BLD: 0.1 K/MCL (ref 0–0.5)
EOSINOPHIL NFR BLD: 2 %
ERYTHROCYTE [DISTWIDTH] IN BLOOD: 13.9 % (ref 11–15)
FASTING DURATION TIME PATIENT: ABNORMAL H
GFR SERPLBLD BASED ON 1.73 SQ M-ARVRAT: >90 ML/MIN
GLOBULIN SER-MCNC: 2.9 G/DL (ref 2–4)
GLUCOSE SERPL-MCNC: 88 MG/DL (ref 70–99)
HCT VFR BLD CALC: 36.8 % (ref 36–46.5)
HGB BLD-MCNC: 11.9 G/DL (ref 12–15.5)
IMM GRANULOCYTES # BLD AUTO: 0 K/MCL (ref 0–0.2)
IMM GRANULOCYTES # BLD: 0 %
LYMPHOCYTES # BLD: 1.1 K/MCL (ref 1–4)
LYMPHOCYTES NFR BLD: 26 %
MCH RBC QN AUTO: 31.2 PG (ref 26–34)
MCHC RBC AUTO-ENTMCNC: 32.3 G/DL (ref 32–36.5)
MCV RBC AUTO: 96.3 FL (ref 78–100)
MONOCYTES # BLD: 0.6 K/MCL (ref 0.3–0.9)
MONOCYTES NFR BLD: 13 %
NEUTROPHILS # BLD: 2.5 K/MCL (ref 1.8–7.7)
NEUTROPHILS NFR BLD: 59 %
NRBC BLD MANUAL-RTO: 0 /100 WBC
PLATELET # BLD AUTO: 215 K/MCL (ref 140–450)
POTASSIUM SERPL-SCNC: 3.8 MMOL/L (ref 3.4–5.1)
PROT SERPL-MCNC: 5.4 G/DL (ref 6.4–8.2)
RBC # BLD: 3.82 MIL/MCL (ref 4–5.2)
SODIUM SERPL-SCNC: 139 MMOL/L (ref 135–145)
WBC # BLD: 4.3 K/MCL (ref 4.2–11)

## 2022-04-02 PROCEDURE — PSEU8250 COMPREHENSIVE METABOLIC PANEL: Performed by: CLINICAL MEDICAL LABORATORY

## 2022-04-02 PROCEDURE — 80053 COMPREHEN METABOLIC PANEL: CPT | Performed by: CLINICAL MEDICAL LABORATORY

## 2022-04-02 PROCEDURE — 85025 COMPLETE CBC W/AUTO DIFF WBC: CPT | Performed by: CLINICAL MEDICAL LABORATORY

## 2022-07-29 ENCOUNTER — HOSPITAL ENCOUNTER (INPATIENT)
Facility: HOSPITAL | Age: 72
LOS: 3 days | Discharge: NURSING, REHAB OR LONG-TERM CARE | End: 2022-08-01
Attending: EMERGENCY MEDICINE | Admitting: INTERNAL MEDICINE
Payer: MEDICARE

## 2022-07-29 ENCOUNTER — APPOINTMENT (OUTPATIENT)
Dept: CT IMAGING | Facility: HOSPITAL | Age: 72
End: 2022-07-29
Attending: EMERGENCY MEDICINE
Payer: MEDICARE

## 2022-07-29 ENCOUNTER — APPOINTMENT (OUTPATIENT)
Dept: GENERAL RADIOLOGY | Facility: HOSPITAL | Age: 72
End: 2022-07-29
Attending: EMERGENCY MEDICINE
Payer: MEDICARE

## 2022-07-29 ENCOUNTER — HOSPITAL ENCOUNTER (OUTPATIENT)
Facility: HOSPITAL | Age: 72
Setting detail: OBSERVATION
Discharge: NURSING, REHAB OR LONG-TERM CARE | End: 2022-08-01
Attending: EMERGENCY MEDICINE | Admitting: INTERNAL MEDICINE
Payer: MEDICARE

## 2022-07-29 DIAGNOSIS — M46.28 SACRAL OSTEOMYELITIS (HCC): ICD-10-CM

## 2022-07-29 DIAGNOSIS — K92.2 UPPER GI BLEED: Primary | ICD-10-CM

## 2022-07-29 LAB
ALBUMIN SERPL-MCNC: 2.5 G/DL (ref 3.4–5)
ALP LIVER SERPL-CCNC: 201 U/L
ALT SERPL-CCNC: 26 U/L
ANION GAP SERPL CALC-SCNC: 6 MMOL/L (ref 0–18)
ANTIBODY SCREEN: NEGATIVE
AST SERPL-CCNC: 20 U/L (ref 15–37)
BASOPHILS # BLD AUTO: 0.01 X10(3) UL (ref 0–0.2)
BASOPHILS NFR BLD AUTO: 0.1 %
BILIRUB DIRECT SERPL-MCNC: <0.1 MG/DL (ref 0–0.2)
BILIRUB SERPL-MCNC: 0.1 MG/DL (ref 0.1–2)
BUN BLD-MCNC: 20 MG/DL (ref 7–18)
BUN/CREAT SERPL: 55.6 (ref 10–20)
CALCIUM BLD-MCNC: 9 MG/DL (ref 8.5–10.1)
CHLORIDE SERPL-SCNC: 102 MMOL/L (ref 98–112)
CO2 SERPL-SCNC: 32 MMOL/L (ref 21–32)
CREAT BLD-MCNC: 0.36 MG/DL
DEPRECATED RDW RBC AUTO: 49.6 FL (ref 35.1–46.3)
EOSINOPHIL # BLD AUTO: 0.08 X10(3) UL (ref 0–0.7)
EOSINOPHIL NFR BLD AUTO: 1 %
ERYTHROCYTE [DISTWIDTH] IN BLOOD BY AUTOMATED COUNT: 14.8 % (ref 11–15)
GLUCOSE BLD-MCNC: 106 MG/DL (ref 70–99)
HCT VFR BLD AUTO: 39.1 %
HGB BLD-MCNC: 11.8 G/DL
IMM GRANULOCYTES # BLD AUTO: 0.03 X10(3) UL (ref 0–1)
IMM GRANULOCYTES NFR BLD: 0.4 %
LYMPHOCYTES # BLD AUTO: 0.93 X10(3) UL (ref 1–4)
LYMPHOCYTES NFR BLD AUTO: 11.7 %
MCH RBC QN AUTO: 27.6 PG (ref 26–34)
MCHC RBC AUTO-ENTMCNC: 30.2 G/DL (ref 31–37)
MCV RBC AUTO: 91.6 FL
MONOCYTES # BLD AUTO: 0.66 X10(3) UL (ref 0.1–1)
MONOCYTES NFR BLD AUTO: 8.3 %
MRSA DNA SPEC QL NAA+PROBE: NEGATIVE
NEUTROPHILS # BLD AUTO: 6.25 X10 (3) UL (ref 1.5–7.7)
NEUTROPHILS # BLD AUTO: 6.25 X10(3) UL (ref 1.5–7.7)
NEUTROPHILS NFR BLD AUTO: 78.5 %
OSMOLALITY SERPL CALC.SUM OF ELEC: 293 MOSM/KG (ref 275–295)
PLATELET # BLD AUTO: 360 10(3)UL (ref 150–450)
POTASSIUM SERPL-SCNC: 3.8 MMOL/L (ref 3.5–5.1)
PROT SERPL-MCNC: 7.1 G/DL (ref 6.4–8.2)
RBC # BLD AUTO: 4.27 X10(6)UL
RH BLOOD TYPE: POSITIVE
SODIUM SERPL-SCNC: 140 MMOL/L (ref 136–145)
WBC # BLD AUTO: 8 X10(3) UL (ref 4–11)

## 2022-07-29 PROCEDURE — 80048 BASIC METABOLIC PNL TOTAL CA: CPT | Performed by: EMERGENCY MEDICINE

## 2022-07-29 PROCEDURE — C9113 INJ PANTOPRAZOLE SODIUM, VIA: HCPCS | Performed by: EMERGENCY MEDICINE

## 2022-07-29 PROCEDURE — 96375 TX/PRO/DX INJ NEW DRUG ADDON: CPT

## 2022-07-29 PROCEDURE — 99285 EMERGENCY DEPT VISIT HI MDM: CPT

## 2022-07-29 PROCEDURE — 86901 BLOOD TYPING SEROLOGIC RH(D): CPT | Performed by: EMERGENCY MEDICINE

## 2022-07-29 PROCEDURE — 86900 BLOOD TYPING SEROLOGIC ABO: CPT | Performed by: EMERGENCY MEDICINE

## 2022-07-29 PROCEDURE — 82962 GLUCOSE BLOOD TEST: CPT

## 2022-07-29 PROCEDURE — 71045 X-RAY EXAM CHEST 1 VIEW: CPT | Performed by: EMERGENCY MEDICINE

## 2022-07-29 PROCEDURE — 87147 CULTURE TYPE IMMUNOLOGIC: CPT | Performed by: EMERGENCY MEDICINE

## 2022-07-29 PROCEDURE — 87186 SC STD MICRODIL/AGAR DIL: CPT | Performed by: EMERGENCY MEDICINE

## 2022-07-29 PROCEDURE — 86850 RBC ANTIBODY SCREEN: CPT | Performed by: EMERGENCY MEDICINE

## 2022-07-29 PROCEDURE — 87641 MR-STAPH DNA AMP PROBE: CPT | Performed by: EMERGENCY MEDICINE

## 2022-07-29 PROCEDURE — 96361 HYDRATE IV INFUSION ADD-ON: CPT

## 2022-07-29 PROCEDURE — 87070 CULTURE OTHR SPECIMN AEROBIC: CPT | Performed by: EMERGENCY MEDICINE

## 2022-07-29 PROCEDURE — 85025 COMPLETE CBC W/AUTO DIFF WBC: CPT | Performed by: EMERGENCY MEDICINE

## 2022-07-29 PROCEDURE — 96374 THER/PROPH/DIAG INJ IV PUSH: CPT

## 2022-07-29 PROCEDURE — 87077 CULTURE AEROBIC IDENTIFY: CPT | Performed by: EMERGENCY MEDICINE

## 2022-07-29 PROCEDURE — 87205 SMEAR GRAM STAIN: CPT | Performed by: EMERGENCY MEDICINE

## 2022-07-29 PROCEDURE — 80076 HEPATIC FUNCTION PANEL: CPT | Performed by: EMERGENCY MEDICINE

## 2022-07-29 PROCEDURE — 74176 CT ABD & PELVIS W/O CONTRAST: CPT | Performed by: EMERGENCY MEDICINE

## 2022-07-29 RX ORDER — PANTOPRAZOLE SODIUM 40 MG/1
40 GRANULE, DELAYED RELEASE ORAL DAILY
COMMUNITY

## 2022-07-29 RX ORDER — ATORVASTATIN CALCIUM 40 MG/1
40 TABLET, FILM COATED ORAL NIGHTLY
Status: DISCONTINUED | OUTPATIENT
Start: 2022-07-29 | End: 2022-08-01

## 2022-07-29 RX ORDER — BISACODYL 10 MG
10 SUPPOSITORY, RECTAL RECTAL
Status: DISCONTINUED | OUTPATIENT
Start: 2022-07-29 | End: 2022-08-01

## 2022-07-29 RX ORDER — PANTOPRAZOLE SODIUM 40 MG/1
40 GRANULE, DELAYED RELEASE ORAL DAILY
Status: DISCONTINUED | OUTPATIENT
Start: 2022-07-29 | End: 2022-07-29

## 2022-07-29 RX ORDER — BACLOFEN 10 MG/1
10 TABLET ORAL 3 TIMES DAILY PRN
COMMUNITY

## 2022-07-29 RX ORDER — ACETAMINOPHEN 325 MG/1
650 TABLET ORAL EVERY 4 HOURS PRN
Status: DISCONTINUED | OUTPATIENT
Start: 2022-07-29 | End: 2022-08-01

## 2022-07-29 RX ORDER — PHENYTOIN 125 MG/5ML
200 SUSPENSION ORAL 2 TIMES DAILY
Status: DISCONTINUED | OUTPATIENT
Start: 2022-07-29 | End: 2022-08-01

## 2022-07-29 RX ORDER — POLYETHYLENE GLYCOL 3350 17 G/17G
17 POWDER, FOR SOLUTION ORAL DAILY
Status: DISCONTINUED | OUTPATIENT
Start: 2022-07-29 | End: 2022-08-01

## 2022-07-29 RX ORDER — COLLAGENASE SANTYL 250 [ARB'U]/G
1 OINTMENT TOPICAL 2 TIMES DAILY
COMMUNITY

## 2022-07-29 RX ORDER — BISACODYL 10 MG
10 SUPPOSITORY, RECTAL RECTAL
COMMUNITY

## 2022-07-29 RX ORDER — ONDANSETRON 2 MG/ML
4 INJECTION INTRAMUSCULAR; INTRAVENOUS ONCE
Status: COMPLETED | OUTPATIENT
Start: 2022-07-29 | End: 2022-07-29

## 2022-07-29 RX ORDER — METOPROLOL TARTRATE 50 MG/1
50 TABLET, FILM COATED ORAL 2 TIMES DAILY
Status: DISCONTINUED | OUTPATIENT
Start: 2022-07-29 | End: 2022-08-01

## 2022-07-29 RX ORDER — BACLOFEN 10 MG/1
10 TABLET ORAL 3 TIMES DAILY PRN
Status: DISCONTINUED | OUTPATIENT
Start: 2022-07-29 | End: 2022-08-01

## 2022-07-29 RX ORDER — SENNOSIDES 8.8 MG/5ML
5 LIQUID ORAL NIGHTLY
Status: DISCONTINUED | OUTPATIENT
Start: 2022-07-29 | End: 2022-08-01

## 2022-07-29 RX ORDER — SODIUM BICARBONATE 650 MG/1
325 TABLET ORAL AS NEEDED
Status: DISCONTINUED | OUTPATIENT
Start: 2022-07-29 | End: 2022-08-01

## 2022-07-29 RX ORDER — ATROPINE SULFATE 10 MG/ML
1 SOLUTION/ DROPS OPHTHALMIC ONCE
Status: COMPLETED | OUTPATIENT
Start: 2022-07-29 | End: 2022-07-29

## 2022-07-29 RX ORDER — CALCIUM CARBONATE/VITAMIN D3 250-3.125
1 TABLET ORAL 2 TIMES DAILY
Status: DISCONTINUED | OUTPATIENT
Start: 2022-07-29 | End: 2022-08-01

## 2022-07-29 RX ORDER — RUFINAMIDE 40 MG/ML
1 SUSPENSION ORAL DAILY
Status: DISCONTINUED | OUTPATIENT
Start: 2022-07-29 | End: 2022-08-01

## 2022-07-29 NOTE — ED INITIAL ASSESSMENT (HPI)
Pt presents from the [facility] for complaints of coffee ground emesis. Per report, staff noted pt \"gagging\" and had episode of coffee ground emesis. En route, pt had episode of small emesis which was mucoidal with scant coffee ground emesis intermixed. Pt is at baseline with pre-existing g-tube.  Dried dark-colored emesis noted in and around oral cavity. +contracted

## 2022-07-29 NOTE — ED QUICK NOTES
Orders for admission, patient is aware of plan and ready to go upstairs. Any questions, please call ED RN Sunil Gardiner at extension [phone].      Patient Covid vaccination status: Unvaccinated     COVID Test Ordered in ED: Rapid SARS-CoV-2 by PCR    COVID Suspicion at Admission: Low clinical suspicion for COVID    Running Infusions:  None    Mental Status/LOC at time of transport: nonverbal. g-tube covid invalid    Other pertinent information:   CIWA score: N/A   NIH score:  N/A

## 2022-07-30 LAB
ANION GAP SERPL CALC-SCNC: 6 MMOL/L (ref 0–18)
BUN BLD-MCNC: 17 MG/DL (ref 7–18)
BUN/CREAT SERPL: 43.6 (ref 10–20)
CALCIUM BLD-MCNC: 9.2 MG/DL (ref 8.5–10.1)
CHLORIDE SERPL-SCNC: 108 MMOL/L (ref 98–112)
CO2 SERPL-SCNC: 29 MMOL/L (ref 21–32)
CREAT BLD-MCNC: 0.39 MG/DL
DEPRECATED RDW RBC AUTO: 51.5 FL (ref 35.1–46.3)
ERYTHROCYTE [DISTWIDTH] IN BLOOD BY AUTOMATED COUNT: 14.9 % (ref 11–15)
GLUCOSE BLD-MCNC: 90 MG/DL (ref 70–99)
GLUCOSE BLDC GLUCOMTR-MCNC: 111 MG/DL (ref 70–99)
GLUCOSE BLDC GLUCOMTR-MCNC: 88 MG/DL (ref 70–99)
GLUCOSE BLDC GLUCOMTR-MCNC: 91 MG/DL (ref 70–99)
GLUCOSE BLDC GLUCOMTR-MCNC: 96 MG/DL (ref 70–99)
HCT VFR BLD AUTO: 33.8 %
HGB BLD-MCNC: 10.1 G/DL
MCH RBC QN AUTO: 28 PG (ref 26–34)
MCHC RBC AUTO-ENTMCNC: 29.9 G/DL (ref 31–37)
MCV RBC AUTO: 93.6 FL
OSMOLALITY SERPL CALC.SUM OF ELEC: 297 MOSM/KG (ref 275–295)
PLATELET # BLD AUTO: 298 10(3)UL (ref 150–450)
POTASSIUM SERPL-SCNC: 3.8 MMOL/L (ref 3.5–5.1)
RBC # BLD AUTO: 3.61 X10(6)UL
SARS-COV-2 RNA RESP QL NAA+PROBE: NOT DETECTED
SODIUM SERPL-SCNC: 143 MMOL/L (ref 136–145)
WBC # BLD AUTO: 4.6 X10(3) UL (ref 4–11)

## 2022-07-30 PROCEDURE — 97162 PT EVAL MOD COMPLEX 30 MIN: CPT

## 2022-07-30 PROCEDURE — 87493 C DIFF AMPLIFIED PROBE: CPT | Performed by: INTERNAL MEDICINE

## 2022-07-30 PROCEDURE — 85027 COMPLETE CBC AUTOMATED: CPT | Performed by: INTERNAL MEDICINE

## 2022-07-30 PROCEDURE — 97530 THERAPEUTIC ACTIVITIES: CPT

## 2022-07-30 PROCEDURE — 82962 GLUCOSE BLOOD TEST: CPT

## 2022-07-30 PROCEDURE — 80048 BASIC METABOLIC PNL TOTAL CA: CPT | Performed by: INTERNAL MEDICINE

## 2022-07-30 NOTE — PLAN OF CARE
Problem: SAFETY ADULT - FALL  Goal: Free from fall injury  Description: INTERVENTIONS:  - Assess pt frequently for physical needs  - Identify cognitive and physical deficits and behaviors that affect risk of falls.   - [place] fall precautions as indicated by assessment.  - Educate pt/family on patient safety including physical limitations  - Instruct pt to call for assistance with activity based on assessment  - Modify environment to reduce risk of injury  - Provide assistive devices as appropriate  - Consider OT/PT consult to assist with strengthening/mobility  - Encourage toileting schedule  Outcome: Progressing     Problem: DISCHARGE PLANNING  Goal: Discharge to home or other facility with appropriate resources  Description: INTERVENTIONS:  - Identify barriers to discharge w/pt and caregiver  - Include patient/family/discharge partner in discharge planning  - Arrange for needed discharge resources and transportation as appropriate  - Identify discharge learning needs (meds, wound care, etc)  - Arrange for interpreters to assist at discharge as needed  - Consider post-discharge preferences of patient/family/discharge partner  - Complete POLST form as appropriate  - Assess patient's ability to be responsible for managing their own health  - Refer to Case Management Department for coordinating discharge planning if the patient needs post-hospital services based on physician/LIP order or complex needs related to functional status, cognitive ability or social support system  Outcome: Progressing     Problem: Altered Communication/Language Barrier  Goal: Patient/Family is able to understand and participate in their care  Description: Interventions:  - Assess communication ability and preferred communication style  - Implement communication aides and strategies  - Use visual cues when possible  - Listen attentively, be patient, do not interrupt  - Minimize distractions  - Allow time for understanding and response  - Establish method for patient to ask for assistance (call light)  - Provide an  as needed  - Communicate barriers and strategies to overcome with those who interact with patient  Outcome: Progressing     Problem: Patient/Family Goals  Goal: Patient/Family Long Term Goal  Description: Patient's Long Term Goal: JENNI-pt is nonverbal, hx tbi    Interventions:  - Follow MD orders  -Assess skin, GI status, tube feeding tolerance  -Turn/reposition  -Wound care  -Monitor vitals, labs  -Administer meds  -GI/ID/ dietary/pt/ot consult  - See additional Care Plan goals for specific interventions  Outcome: Progressing  Goal: Patient/Family Short Term Goal  Description: Patient's Short Term Goal: JENNI-pt is nonverbal, hx tbi      Interventions:   -Follow MD orders  -Assess skin, GI status, tube feeding tolerance  -Turn/reposition  -Wound care  -Monitor vitals, labs  -Administer meds  -GI/ID/ dietary/pt/ot consult    - See additional Care Plan goals for specific interventions  Outcome: Progressing     Problem: GASTROINTESTINAL - ADULT  Goal: Minimal or absence of nausea and vomiting  Description: INTERVENTIONS:  - Maintain adequate hydration with IV or PO as ordered and tolerated  - Nasogastric tube to low intermittent suction as ordered  - Evaluate effectiveness of ordered antiemetic medications  - Provide nonpharmacologic comfort measures as appropriate  - Advance diet as tolerated, if ordered  - Obtain nutritional consult as needed  - Evaluate fluid balance  Outcome: Progressing     Problem: SKIN/TISSUE INTEGRITY - ADULT  Goal: Skin integrity remains intact  Description: INTERVENTIONS  - Assess and document risk factors for pressure ulcer development  - Assess and document skin integrity  - Monitor for areas of redness and/or skin breakdown  - Initiate interventions, skin care algorithm/standards of care as needed  Outcome: Progressing  Goal: Incision(s), wounds(s) or drain site(s) healing without S/S of infection  Description: INTERVENTIONS:  - Assess and document risk factors for pressure ulcer development  - Assess and document skin integrity  - Assess and document dressing/incision, wound bed, drain sites and surrounding tissue  - Implement wound care per orders  - Initiate isolation precautions as appropriate  - Initiate Pressure Ulcer prevention bundle as indicated  Outcome: Progressing     Problem: NEUROLOGICAL - ADULT  Goal: Achieves stable or improved neurological status  Description: INTERVENTIONS  - Assess for and report changes in neurological status  - Initiate measures to prevent increased intracranial pressure  - Maintain blood pressure and fluid volume within ordered parameters to optimize cerebral perfusion and minimize risk of hemorrhage  - Monitor temperature, glucose, and sodium. Initiate appropriate interventions as ordered  Outcome: Progressing  Goal: Absence of seizures  Description: INTERVENTIONS  - Monitor for seizure activity  - Administer anti-seizure medications as ordered  - Monitor neurological status  Outcome: Progressing   Tube feeding started, monitored for tolerance, no emesis noted, all precautions are in place, wound care done, turned/repositioned, cont to monitor.

## 2022-07-30 NOTE — PLAN OF CARE
No c/o pain. Repositioned. G-tube okay to use per Dr. Marissa Daniels. G-tube patent. No occurrence of emesis during shift. Dressing changed. Spoke to [place]. Updated her on plan of care. Safety precautions maintained. Frequent rounding. Problem: PAIN - ADULT  Goal: Verbalizes/displays adequate comfort level or patient's stated pain goal  Description: INTERVENTIONS:  - Encourage pt to monitor pain and request assistance  - Assess pain using appropriate pain scale  - Administer analgesics based on type and severity of pain and evaluate response  - Implement non-pharmacological measures as appropriate and evaluate response  - Consider cultural and social influences on pain and pain management  - Manage/alleviate anxiety  - Utilize distraction and/or relaxation techniques  - Monitor for opioid side effects  - Notify MD/LIP if interventions unsuccessful or patient reports new pain  - Anticipate increased pain with activity and pre-medicate as appropriate  Outcome: Progressing     Problem: RISK FOR INFECTION - ADULT  Goal: Absence of fever/infection during anticipated neutropenic period  Description: INTERVENTIONS  - Monitor WBC  - Administer growth factors as ordered  - Implement neutropenic guidelines  Outcome: Progressing     Problem: SAFETY ADULT - FALL  Goal: Free from fall injury  Description: INTERVENTIONS:  - Assess pt frequently for physical needs  - Identify cognitive and physical deficits and behaviors that affect risk of falls.   - [place] fall precautions as indicated by assessment.  - Educate pt/family on patient safety including physical limitations  - Instruct pt to call for assistance with activity based on assessment  - Modify environment to reduce risk of injury  - Provide assistive devices as appropriate  - Consider OT/PT consult to assist with strengthening/mobility  - Encourage toileting schedule  Outcome: Progressing     Problem: DISCHARGE PLANNING  Goal: Discharge to home or other facility with appropriate resources  Description: INTERVENTIONS:  - Identify barriers to discharge w/pt and caregiver  - Include patient/family/discharge partner in discharge planning  - Arrange for needed discharge resources and transportation as appropriate  - Identify discharge learning needs (meds, wound care, etc)  - Arrange for interpreters to assist at discharge as needed  - Consider post-discharge preferences of patient/family/discharge partner  - Complete POLST form as appropriate  - Assess patient's ability to be responsible for managing their own health  - Refer to Case Management Department for coordinating discharge planning if the patient needs post-hospital services based on physician/LIP order or complex needs related to functional status, cognitive ability or social support system  Outcome: Progressing     Problem: Altered Communication/Language Barrier  Goal: Patient/Family is able to understand and participate in their care  Description: Interventions:  - Assess communication ability and preferred communication style  - Implement communication aides and strategies  - Use visual cues when possible  - Listen attentively, be patient, do not interrupt  - Minimize distractions  - Allow time for understanding and response  - Establish method for patient to ask for assistance (call light)  - Provide an  as needed  - Communicate barriers and strategies to overcome with those who interact with patient  Outcome: Progressing     Problem: Patient/Family Goals  Goal: Patient/Family Long Term Goal  Description: Patient's Long Term Goal:     Interventions:  - See additional Care Plan goals for specific interventions  Outcome: Progressing  Goal: Patient/Family Short Term Goal  Description: Patient's Short Term Goal:     Interventions:   - See additional Care Plan goals for specific interventions  Outcome: Progressing

## 2022-07-30 NOTE — PLAN OF CARE
No sign and symptoms of pain. Dressing changed. C.diff results pending. G-tube patent and intact. Feedings at goal rate, tolerating well. Repositioned. Spoke to [place], updated on plan of care. Plan to monitor H&H, continue on PPIs and monitor off antibiotics. Safety precautions maintained. Problem: PAIN - ADULT  Goal: Verbalizes/displays adequate comfort level or patient's stated pain goal  Description: INTERVENTIONS:  - Encourage pt to monitor pain and request assistance  - Assess pain using appropriate pain scale  - Administer analgesics based on type and severity of pain and evaluate response  - Implement non-pharmacological measures as appropriate and evaluate response  - Consider cultural and social influences on pain and pain management  - Manage/alleviate anxiety  - Utilize distraction and/or relaxation techniques  - Monitor for opioid side effects  - Notify MD/LIP if interventions unsuccessful or patient reports new pain  - Anticipate increased pain with activity and pre-medicate as appropriate  Outcome: Progressing     Problem: RISK FOR INFECTION - ADULT  Goal: Absence of fever/infection during anticipated neutropenic period  Description: INTERVENTIONS  - Monitor WBC  - Administer growth factors as ordered  - Implement neutropenic guidelines  Outcome: Progressing     Problem: SAFETY ADULT - FALL  Goal: Free from fall injury  Description: INTERVENTIONS:  - Assess pt frequently for physical needs  - Identify cognitive and physical deficits and behaviors that affect risk of falls.   - [place] fall precautions as indicated by assessment.  - Educate pt/family on patient safety including physical limitations  - Instruct pt to call for assistance with activity based on assessment  - Modify environment to reduce risk of injury  - Provide assistive devices as appropriate  - Consider OT/PT consult to assist with strengthening/mobility  - Encourage toileting schedule  Outcome: Progressing     Problem: DISCHARGE PLANNING  Goal: Discharge to home or other facility with appropriate resources  Description: INTERVENTIONS:  - Identify barriers to discharge w/pt and caregiver  - Include patient/family/discharge partner in discharge planning  - Arrange for needed discharge resources and transportation as appropriate  - Identify discharge learning needs (meds, wound care, etc)  - Arrange for interpreters to assist at discharge as needed  - Consider post-discharge preferences of patient/family/discharge partner  - Complete POLST form as appropriate  - Assess patient's ability to be responsible for managing their own health  - Refer to Case Management Department for coordinating discharge planning if the patient needs post-hospital services based on physician/LIP order or complex needs related to functional status, cognitive ability or social support system  Outcome: Progressing     Problem: Altered Communication/Language Barrier  Goal: Patient/Family is able to understand and participate in their care  Description: Interventions:  - Assess communication ability and preferred communication style  - Implement communication aides and strategies  - Use visual cues when possible  - Listen attentively, be patient, do not interrupt  - Minimize distractions  - Allow time for understanding and response  - Establish method for patient to ask for assistance (call light)  - Provide an  as needed  - Communicate barriers and strategies to overcome with those who interact with patient  Outcome: Progressing     Problem: Patient/Family Goals  Goal: Patient/Family Long Term Goal  Description: Patient's Long Term Goal:     Interventions:  - See additional Care Plan goals for specific interventions  Outcome: Progressing  Goal: Patient/Family Short Term Goal  Description: Patient's Short Term Goal:     Interventions:   - See additional Care Plan goals for specific interventions  Outcome: Progressing     Problem: GASTROINTESTINAL - ADULT  Goal: Minimal or absence of nausea and vomiting  Description: INTERVENTIONS:  - Maintain adequate hydration with IV or PO as ordered and tolerated  - Nasogastric tube to low intermittent suction as ordered  - Evaluate effectiveness of ordered antiemetic medications  - Provide nonpharmacologic comfort measures as appropriate  - Advance diet as tolerated, if ordered  - Obtain nutritional consult as needed  - Evaluate fluid balance  Outcome: Progressing     Problem: SKIN/TISSUE INTEGRITY - ADULT  Goal: Skin integrity remains intact  Description: INTERVENTIONS  - Assess and document risk factors for pressure ulcer development  - Assess and document skin integrity  - Monitor for areas of redness and/or skin breakdown  - Initiate interventions, skin care algorithm/standards of care as needed  Outcome: Progressing  Goal: Incision(s), wounds(s) or drain site(s) healing without S/S of infection  Description: INTERVENTIONS:  - Assess and document risk factors for pressure ulcer development  - Assess and document skin integrity  - Assess and document dressing/incision, wound bed, drain sites and surrounding tissue  - Implement wound care per orders  - Initiate isolation precautions as appropriate  - Initiate Pressure Ulcer prevention bundle as indicated  Outcome: Progressing     Problem: NEUROLOGICAL - ADULT  Goal: Achieves stable or improved neurological status  Description: INTERVENTIONS  - Assess for and report changes in neurological status  - Initiate measures to prevent increased intracranial pressure  - Maintain blood pressure and fluid volume within ordered parameters to optimize cerebral perfusion and minimize risk of hemorrhage  - Monitor temperature, glucose, and sodium.  Initiate appropriate interventions as ordered  Outcome: Progressing  Goal: Absence of seizures  Description: INTERVENTIONS  - Monitor for seizure activity  - Administer anti-seizure medications as ordered  - Monitor neurological status  Outcome: Progressing     Problem: Patient Centered Care  Goal: Patient preferences are identified and integrated in the patient's plan of care  Description: Interventions:  - What would you like us to know as we care for you?   - Provide timely, complete, and accurate information to patient/family  - Incorporate patient and family knowledge, values, beliefs, and cultural backgrounds into the planning and delivery of care  - Encourage patient/family to participate in care and decision-making at the level they choose  - Honor patient and family perspectives and choices  Outcome: Progressing

## 2022-07-31 LAB
C DIFF TOX B STL QL: NEGATIVE
GLUCOSE BLDC GLUCOMTR-MCNC: 102 MG/DL (ref 70–99)
GLUCOSE BLDC GLUCOMTR-MCNC: 133 MG/DL (ref 70–99)
GLUCOSE BLDC GLUCOMTR-MCNC: 151 MG/DL (ref 70–99)
GLUCOSE BLDC GLUCOMTR-MCNC: 86 MG/DL (ref 70–99)
GLUCOSE BLDC GLUCOMTR-MCNC: 86 MG/DL (ref 70–99)
HCT VFR BLD AUTO: 35.4 %
HGB BLD-MCNC: 10.6 G/DL

## 2022-07-31 PROCEDURE — 85018 HEMOGLOBIN: CPT | Performed by: INTERNAL MEDICINE

## 2022-07-31 PROCEDURE — 82962 GLUCOSE BLOOD TEST: CPT

## 2022-07-31 PROCEDURE — 85014 HEMATOCRIT: CPT | Performed by: INTERNAL MEDICINE

## 2022-07-31 NOTE — PLAN OF CARE
Problem: PAIN - ADULT  Goal: Verbalizes/displays adequate comfort level or patient's stated pain goal  Description: INTERVENTIONS:  - Encourage pt to monitor pain and request assistance  - Assess pain using appropriate pain scale  - Administer analgesics based on type and severity of pain and evaluate response  - Implement non-pharmacological measures as appropriate and evaluate response  - Consider cultural and social influences on pain and pain management  - Manage/alleviate anxiety  - Utilize distraction and/or relaxation techniques  - Monitor for opioid side effects  - Notify MD/LIP if interventions unsuccessful or patient reports new pain  - Anticipate increased pain with activity and pre-medicate as appropriate  Outcome: Progressing     Problem: RISK FOR INFECTION - ADULT  Goal: Absence of fever/infection during anticipated neutropenic period  Description: INTERVENTIONS  - Monitor WBC  - Administer growth factors as ordered  - Implement neutropenic guidelines  Outcome: Progressing     Problem: SAFETY ADULT - FALL  Goal: Free from fall injury  Description: INTERVENTIONS:  - Assess pt frequently for physical needs  - Identify cognitive and physical deficits and behaviors that affect risk of falls.   - [place] fall precautions as indicated by assessment.  - Educate pt/family on patient safety including physical limitations  - Instruct pt to call for assistance with activity based on assessment  - Modify environment to reduce risk of injury  - Provide assistive devices as appropriate  - Consider OT/PT consult to assist with strengthening/mobility  - Encourage toileting schedule  Outcome: Progressing     Problem: DISCHARGE PLANNING  Goal: Discharge to home or other facility with appropriate resources  Description: INTERVENTIONS:  - Identify barriers to discharge w/pt and caregiver  - Include patient/family/discharge partner in discharge planning  - Arrange for needed discharge resources and transportation as appropriate  - Identify discharge learning needs (meds, wound care, etc)  - Arrange for interpreters to assist at discharge as needed  - Consider post-discharge preferences of patient/family/discharge partner  - Complete POLST form as appropriate  - Assess patient's ability to be responsible for managing their own health  - Refer to Case Management Department for coordinating discharge planning if the patient needs post-hospital services based on physician/LIP order or complex needs related to functional status, cognitive ability or social support system  Outcome: Progressing     Problem: Altered Communication/Language Barrier  Goal: Patient/Family is able to understand and participate in their care  Description: Interventions:  - Assess communication ability and preferred communication style  - Implement communication aides and strategies  - Use visual cues when possible  - Listen attentively, be patient, do not interrupt  - Minimize distractions  - Allow time for understanding and response  - Establish method for patient to ask for assistance (call light)  - Provide an  as needed  - Communicate barriers and strategies to overcome with those who interact with patient  Outcome: Progressing     Problem: GASTROINTESTINAL - ADULT  Goal: Minimal or absence of nausea and vomiting  Description: INTERVENTIONS:  - Maintain adequate hydration with IV or PO as ordered and tolerated  - Nasogastric tube to low intermittent suction as ordered  - Evaluate effectiveness of ordered antiemetic medications  - Provide nonpharmacologic comfort measures as appropriate  - Advance diet as tolerated, if ordered  - Obtain nutritional consult as needed  - Evaluate fluid balance  Outcome: Progressing  Goal: Maintains or returns to baseline bowel function  Description: INTERVENTIONS:  - Assess bowel function  - Maintain adequate hydration with IV or PO as ordered and tolerated  - Evaluate effectiveness of GI medications  - Encourage mobilization and activity  - Obtain nutritional consult as needed  - Establish a toileting routine/schedule  - Consider collaborating with pharmacy to review patient's medication profile  Outcome: Progressing     Problem: SKIN/TISSUE INTEGRITY - ADULT  Goal: Skin integrity remains intact  Description: INTERVENTIONS  - Assess and document risk factors for pressure ulcer development  - Assess and document skin integrity  - Monitor for areas of redness and/or skin breakdown  - Initiate interventions, skin care algorithm/standards of care as needed  Outcome: Progressing  Goal: Incision(s), wounds(s) or drain site(s) healing without S/S of infection  Description: INTERVENTIONS:  - Assess and document risk factors for pressure ulcer development  - Assess and document skin integrity  - Assess and document dressing/incision, wound bed, drain sites and surrounding tissue  - Implement wound care per orders  - Initiate isolation precautions as appropriate  - Initiate Pressure Ulcer prevention bundle as indicated  Outcome: Progressing     Problem: NEUROLOGICAL - ADULT  Goal: Achieves stable or improved neurological status  Description: INTERVENTIONS  - Assess for and report changes in neurological status  - Initiate measures to prevent increased intracranial pressure  - Maintain blood pressure and fluid volume within ordered parameters to optimize cerebral perfusion and minimize risk of hemorrhage  - Monitor temperature, glucose, and sodium.  Initiate appropriate interventions as ordered  Outcome: Progressing  Goal: Absence of seizures  Description: INTERVENTIONS  - Monitor for seizure activity  - Administer anti-seizure medications as ordered  - Monitor neurological status  Outcome: Progressing     Problem: Patient Centered Care  Goal: Patient preferences are identified and integrated in the patient's plan of care  Description: Interventions:  - What would you like us to know as we care for you?   - Provide timely, complete, and accurate information to patient/family  - Incorporate patient and family knowledge, values, beliefs, and cultural backgrounds into the planning and delivery of care  - Encourage patient/family to participate in care and decision-making at the level they choose  - Honor patient and family perspectives and choices  Outcome: Progressing

## 2022-07-31 NOTE — PLAN OF CARE
Problem: PAIN - ADULT  Goal: Verbalizes/displays adequate comfort level or patient's stated pain goal  Description: INTERVENTIONS:  - Encourage pt to monitor pain and request assistance  - Assess pain using appropriate pain scale  - Administer analgesics based on type and severity of pain and evaluate response  - Implement non-pharmacological measures as appropriate and evaluate response  - Consider cultural and social influences on pain and pain management  - Manage/alleviate anxiety  - Utilize distraction and/or relaxation techniques  - Monitor for opioid side effects  - Notify MD/LIP if interventions unsuccessful or patient reports new pain  - Anticipate increased pain with activity and pre-medicate as appropriate  Outcome: Progressing      Problem: SAFETY ADULT - FALL  Goal: Free from fall injury  Description: INTERVENTIONS:  - Assess pt frequently for physical needs  - Identify cognitive and physical deficits and behaviors that affect risk of falls.   - [place] fall precautions as indicated by assessment.  - Educate pt/family on patient safety including physical limitations  - Instruct pt to call for assistance with activity based on assessment  - Modify environment to reduce risk of injury  - Provide assistive devices as appropriate  - Consider OT/PT consult to assist with strengthening/mobility  - Encourage toileting schedule  Outcome: Progressing     Problem: DISCHARGE PLANNING  Goal: Discharge to home or other facility with appropriate resources  Description: INTERVENTIONS:  - Identify barriers to discharge w/pt and caregiver  - Include patient/family/discharge partner in discharge planning  - Arrange for needed discharge resources and transportation as appropriate  - Identify discharge learning needs (meds, wound care, etc)  - Arrange for interpreters to assist at discharge as needed  - Consider post-discharge preferences of patient/family/discharge partner  - Complete POLST form as appropriate  - Assess patient's ability to be responsible for managing their own health  - Refer to Case Management Department for coordinating discharge planning if the patient needs post-hospital services based on physician/LIP order or complex needs related to functional status, cognitive ability or social support system  Outcome: Progressing     Problem: Altered Communication/Language Barrier  Goal: Patient/Family is able to understand and participate in their care  Description: Interventions:  - Assess communication ability and preferred communication style  - Implement communication aides and strategies  - Use visual cues when possible  - Listen attentively, be patient, do not interrupt  - Minimize distractions  - Allow time for understanding and response  - Establish method for patient to ask for assistance (call light)  - Provide an  as needed  - Communicate barriers and strategies to overcome with those who interact with patient  Outcome: Progressing     Problem: Patient/Family Goals  Goal: Patient/Family Long Term Goal  Description: Patient's Long Term Goal: JENNI- pt non-verbal, hx tbi    Interventions:  - Follow MD orders  -Assess skin, GI status, tube feeding tolerance  -Turn/reposition  -Wound care  -Monitor vitals, labs  -Administer meds  -GI/ID/ dietary/pt/ot consult  - See additional Care Plan goals for specific interventions  Outcome: Progressing  Goal: Patient/Family Short Term Goal  Description: Patient's Short Term Goal: JENNI- pt non-verbal, hx tbi  Interventions:   - Follow MD orders  -Assess skin, GI status, tube feeding tolerance  -Turn/reposition  -Wound care  -Monitor vitals, labs  -Administer meds  -GI/ID/ dietary/pt/ot consult  - See additional Care Plan goals for specific interventions  Outcome: Progressing     Problem: GASTROINTESTINAL - ADULT  Goal: Minimal or absence of nausea and vomiting  Description: INTERVENTIONS:  - Maintain adequate hydration with IV or PO as ordered and tolerated  - Nasogastric tube to low intermittent suction as ordered  - Evaluate effectiveness of ordered antiemetic medications  - Provide nonpharmacologic comfort measures as appropriate  - Advance diet as tolerated, if ordered  - Obtain nutritional consult as needed  - Evaluate fluid balance  Outcome: Progressing  Goal: Maintains or returns to baseline bowel function  Description: INTERVENTIONS:  - Assess bowel function  - Maintain adequate hydration with IV or PO as ordered and tolerated  - Evaluate effectiveness of GI medications  - Encourage mobilization and activity  - Obtain nutritional consult as needed  - Establish a toileting routine/schedule  - Consider collaborating with pharmacy to review patient's medication profile  Outcome: Progressing     Problem: SKIN/TISSUE INTEGRITY - ADULT  Goal: Skin integrity remains intact  Description: INTERVENTIONS  - Assess and document risk factors for pressure ulcer development  - Assess and document skin integrity  - Monitor for areas of redness and/or skin breakdown  - Initiate interventions, skin care algorithm/standards of care as needed  Outcome: Progressing  Goal: Incision(s), wounds(s) or drain site(s) healing without S/S of infection  Description: INTERVENTIONS:  - Assess and document risk factors for pressure ulcer development  - Assess and document skin integrity  - Assess and document dressing/incision, wound bed, drain sites and surrounding tissue  - Implement wound care per orders  - Initiate isolation precautions as appropriate  - Initiate Pressure Ulcer prevention bundle as indicated  Outcome: Progressing     Problem: NEUROLOGICAL - ADULT  Goal: Achieves stable or improved neurological status  Description: INTERVENTIONS  - Assess for and report changes in neurological status  - Initiate measures to prevent increased intracranial pressure  - Maintain blood pressure and fluid volume within ordered parameters to optimize cerebral perfusion and minimize risk of hemorrhage  - Monitor temperature, glucose, and sodium. Initiate appropriate interventions as ordered  Outcome: Progressing  Goal: Absence of seizures  Description: INTERVENTIONS  - Monitor for seizure activity  - Administer anti-seizure medications as ordered  - Monitor neurological status  Outcome: Progressing     Problem: Patient Centered Care  Goal: Patient preferences are identified and integrated in the patient's plan of care  Description: Interventions:  - What would you like us to know as we care for you? Pt is from [facility].  - Provide timely, complete, and accurate information to patient/family  - Incorporate patient and family knowledge, values, beliefs, and cultural backgrounds into the planning and delivery of care  - Encourage patient/family to participate in care and decision-making at the level they choose  - Honor patient and family perspectives and choices  Outcome: Progressing   Pt is tolerating tube feeding well (at goal rate), turned/repositioned,sleeping at intervals during the noc,  had bath this am, wound care done, blood sugar monitored q 6hrs, c. Diff stools pending.

## 2022-07-31 NOTE — OCCUPATIONAL THERAPY NOTE
OT order was recieved from mobility screening panel, chart review was completed. Upon discussion with RN and reviewing PT initial eval; pt is a NH resident, with HX of TBI, non-verbal, has PEG tube in place, dependent for transfers and ADLs and at baseline. Skilled OT intervention is not indicated at this time. DC IP OT. Should pt's needs change requiring skilled intervention, please re-order OT.   Thank you for your referral.      Clarisse Jackman, OTR/L  [address]

## 2022-08-01 VITALS
TEMPERATURE: 98 F | RESPIRATION RATE: 18 BRPM | BODY MASS INDEX: 25.1 KG/M2 | WEIGHT: 147 LBS | SYSTOLIC BLOOD PRESSURE: 132 MMHG | OXYGEN SATURATION: 100 % | HEIGHT: 64 IN | HEART RATE: 96 BPM | DIASTOLIC BLOOD PRESSURE: 77 MMHG

## 2022-08-01 LAB
GLUCOSE BLDC GLUCOMTR-MCNC: 93 MG/DL (ref 70–99)
GLUCOSE BLDC GLUCOMTR-MCNC: 96 MG/DL (ref 70–99)

## 2022-08-01 PROCEDURE — 82962 GLUCOSE BLOOD TEST: CPT

## 2022-08-01 NOTE — PROGRESS NOTES
Pt discharged back to [facility], report given to RN at facility. Transport provided by [facility]. Discharge paperwork and prescriptions reviewed, all questions and concerns addressed. IV access discontinued.

## 2022-08-01 NOTE — CM/SW NOTE
Department  notified of request for lamine HANNA referrals started. Assigned CM/SW to follow up with pt/family on further discharge planning.      Artis Canela   August 01, 2022   09:01

## 2022-08-01 NOTE — WOUND PROGRESS NOTE
Wound Care Services  Attempting to see the pt. she is leaving with [address] [place] [address] and is discharged

## 2022-08-01 NOTE — PLAN OF CARE
Total care pt. Required frequent oral suctioning of saliva secretions. Has moist cough and more phlegm comes up. HOB for GTF. Contracted. Attempted to keep pt off of sacral wound and supported with pillows. Possible return to the [facility] today per MD notes. Problem: PAIN - ADULT  Goal: Verbalizes/displays adequate comfort level or patient's stated pain goal  Description: INTERVENTIONS:  - Encourage pt to monitor pain and request assistance  - Assess pain using appropriate pain scale  - Administer analgesics based on type and severity of pain and evaluate response  - Implement non-pharmacological measures as appropriate and evaluate response  - Consider cultural and social influences on pain and pain management  - Manage/alleviate anxiety  - Utilize distraction and/or relaxation techniques  - Monitor for opioid side effects  - Notify MD/LIP if interventions unsuccessful or patient reports new pain  - Anticipate increased pain with activity and pre-medicate as appropriate  Outcome: Progressing     Problem: RISK FOR INFECTION - ADULT  Goal: Absence of fever/infection during anticipated neutropenic period  Description: INTERVENTIONS  - Monitor WBC  - Administer growth factors as ordered  - Implement neutropenic guidelines  Outcome: Progressing     Problem: SAFETY ADULT - FALL  Goal: Free from fall injury  Description: INTERVENTIONS:  - Assess pt frequently for physical needs  - Identify cognitive and physical deficits and behaviors that affect risk of falls.   - [place] fall precautions as indicated by assessment.  - Educate pt/family on patient safety including physical limitations  - Instruct pt to call for assistance with activity based on assessment  - Modify environment to reduce risk of injury  - Provide assistive devices as appropriate  - Consider OT/PT consult to assist with strengthening/mobility  - Encourage toileting schedule  Outcome: Progressing     Problem: DISCHARGE PLANNING  Goal: Discharge to home or other facility with appropriate resources  Description: INTERVENTIONS:  - Identify barriers to discharge w/pt and caregiver  - Include patient/family/discharge partner in discharge planning  - Arrange for needed discharge resources and transportation as appropriate  - Identify discharge learning needs (meds, wound care, etc)  - Arrange for interpreters to assist at discharge as needed  - Consider post-discharge preferences of patient/family/discharge partner  - Complete POLST form as appropriate  - Assess patient's ability to be responsible for managing their own health  - Refer to Case Management Department for coordinating discharge planning if the patient needs post-hospital services based on physician/LIP order or complex needs related to functional status, cognitive ability or social support system  Outcome: Progressing     Problem: Altered Communication/Language Barrier  Goal: Patient/Family is able to understand and participate in their care  Description: Interventions:  - Assess communication ability and preferred communication style  - Implement communication aides and strategies  - Use visual cues when possible  - Listen attentively, be patient, do not interrupt  - Minimize distractions  - Allow time for understanding and response  - Establish method for patient to ask for assistance (call light)  - Provide an  as needed  - Communicate barriers and strategies to overcome with those who interact with patient  Outcome: Progressing        Problem: GASTROINTESTINAL - ADULT  Goal: Minimal or absence of nausea and vomiting  Description: INTERVENTIONS:  - Maintain adequate hydration with IV or PO as ordered and tolerated  - Nasogastric tube to low intermittent suction as ordered  - Evaluate effectiveness of ordered antiemetic medications  - Provide nonpharmacologic comfort measures as appropriate  - Advance diet as tolerated, if ordered  - Obtain nutritional consult as needed  - Evaluate fluid balance  Outcome: Progressing  Goal: Maintains or returns to baseline bowel function  Description: INTERVENTIONS:  - Assess bowel function  - Maintain adequate hydration with IV or PO as ordered and tolerated  - Evaluate effectiveness of GI medications  - Encourage mobilization and activity  - Obtain nutritional consult as needed  - Establish a toileting routine/schedule  - Consider collaborating with pharmacy to review patient's medication profile  Outcome: Progressing     Problem: SKIN/TISSUE INTEGRITY - ADULT  Goal: Skin integrity remains intact  Description: INTERVENTIONS  - Assess and document risk factors for pressure ulcer development  - Assess and document skin integrity  - Monitor for areas of redness and/or skin breakdown  - Initiate interventions, skin care algorithm/standards of care as needed  Outcome: Progressing  Goal: Incision(s), wounds(s) or drain site(s) healing without S/S of infection  Description: INTERVENTIONS:  - Assess and document risk factors for pressure ulcer development  - Assess and document skin integrity  - Assess and document dressing/incision, wound bed, drain sites and surrounding tissue  - Implement wound care per orders  - Initiate isolation precautions as appropriate  - Initiate Pressure Ulcer prevention bundle as indicated  Outcome: Progressing     Problem: NEUROLOGICAL - ADULT  Goal: Achieves stable or improved neurological status  Description: INTERVENTIONS  - Assess for and report changes in neurological status  - Initiate measures to prevent increased intracranial pressure  - Maintain blood pressure and fluid volume within ordered parameters to optimize cerebral perfusion and minimize risk of hemorrhage  - Monitor temperature, glucose, and sodium.  Initiate appropriate interventions as ordered  Outcome: Progressing  Goal: Absence of seizures  Description: INTERVENTIONS  - Monitor for seizure activity  - Administer anti-seizure medications as ordered  - Monitor neurological status  Outcome: Progressing

## 2022-08-01 NOTE — CM/SW NOTE
DC order in placed. Pt is LTC at [facility]. Pt has been resident since 1/29/2021. Return referral sent. Rocco Damico confirmed pt can return today (any time). RN confirmed pt will require ambulance. TAYO discussed DC plan with RN. PLAN: return to the [facility] today 8/1 @ 11:15AM ,PCS done, [facility] ([phone])  Report: [phone]    SW remains available for support and/or discharge planning. Please do not hesitate to call/chat SW if further DC needs arise.      Sasha ACHARYA, [place], [place]   Ext [phone]

## 2022-08-01 NOTE — DISCHARGE SUMMARY
[facility] - [facility]    Discharge Summary    Nely Christie Patient Status:  Inpatient    1950 MRN [id number]   Location [facility] 5SW/SE Attending Fay Cueva MD   Hosp Day # 3 PCP Cal Sanders MD, Cristofer Reyes MD     Date of Admission: 2022   Date of Discharge: 2022    Admitting Diagnosis: Upper GI bleed [K92.2]  Sacral osteomyelitis [facility]) [M46.28]    Disposition: Transfer to [facility]: [facility]    Discharge Diagnosis: . Principal Problem:    Upper GI bleed  Active Problems:    Sacral osteomyelitis [facility])      Hospital Course:   Reason for Admission: GI bleed  Discharge Physical Exam: General appearance:  alert, appears stated age and cooperative  Pulmonary: clear to auscultation bilaterally  Cardiovascular: S1, S2 normal, no murmur, click, rub or gallop, regular rate and rhythm, S1, S2 normal  Abdominal: soft, non-tender; bowel sounds normal; no masses,  no organomegaly  Extremities: extremities normal, atraumatic, no cyanosis or edema  Pulses: 2+ and symmetric    Hospital Course: Pt seen by GI and ID no abx per ID, per GI no further intervention, H/H stable DC to [facility]    Complications: None    Consultants  Chat With All Active Members    Provider Role Specialty    Elizabeth Iverson MD  Consulting Physician  INFECTIOUS DISEASES    Ryan Gonzalez MD  Consulting Physician  GASTROENTEROLOGY    Fay Cueva MD Consulting Physician  Internal Medicine          Pending Labs     Order Current Status    Aerobic Bacterial Culture Preliminary result          Discharge Plan:   Discharge Condition: Stable    Current Discharge Medication List    Home Meds - Modified    omeprazole 2 mg/mL Oral Suspension  Take 10 mL (20 mg total) by mouth daily. Home Meds - Unchanged    baclofen 10 MG Oral Tab  Take 10 mg by mouth 3 (three) times daily as needed. bisacodyl 10 MG Rectal Suppos  Place 10 mg rectally every third day as needed (constipation).     metoprolol tartrate 25 MG Oral Tab  Take 50 mg by mouth 2 (two) times daily. Hold SBP < 100 or heart rate < 60    Pantoprazole Sodium 40 MG Oral Powd Pack  Take 40 mg by mouth daily. collagenase (SANTYL) 250 UNIT/GM External Ointment  Apply 1 Application topically 2 (two) times a day. To sacral wound    sennosides 8.8 MG/5ML Oral Syrup  Take 5 mL by mouth nightly. PEG 3350 17 g Oral Powd Pack  Take 17 g by mouth daily. Calcium 500-125 MG-UNIT Oral Tab  Take by mouth 2 (two) times a day. atorvastatin 40 MG Oral Tab  Take 40 mg by mouth nightly. docusate sodium 50 MG/5ML Oral Liquid  Take 100 mg by mouth 2 (two) times daily. magnesium oxide 400 MG Oral Tab  Take 400 mg by mouth daily. ferrous sulfate 325 (65 FE) MG Oral Tab EC  Take 325 mg by mouth daily. Multiple Vitamin (MULTI-DAY VITAMINS) Oral Tab  Take by mouth daily. phenytoin 125 MG/5ML Oral Suspension  Take 200 mg by mouth 2 (two) times daily. acetaminophen 325 MG Oral Tab  Take 650 mg by mouth 2 (two) times daily as needed for Pain. Atropine Sulfate 1 % Ophthalmic Solution  1-2 drops SUBLINGUAL- every hour when necessary for excessive secretions    DERMACERIN External Cream  Apply topically as needed. ergocalciferol 37085 units Oral Cap  Take 5,000 Units by mouth once a week. Discharge Diet: As tolerated    Discharge Activity: As tolerated    Follow up:        Follow up Labs:1 week      Leydi Laguna MD, Murray Gorman MD  8/1/2022

## 2022-08-01 NOTE — PLAN OF CARE
Problem: PAIN - ADULT  Goal: Verbalizes/displays adequate comfort level or patient's stated pain goal  Description: INTERVENTIONS:  - Encourage pt to monitor pain and request assistance  - Assess pain using appropriate pain scale  - Administer analgesics based on type and severity of pain and evaluate response  - Implement non-pharmacological measures as appropriate and evaluate response  - Consider cultural and social influences on pain and pain management  - Manage/alleviate anxiety  - Utilize distraction and/or relaxation techniques  - Monitor for opioid side effects  - Notify MD/LIP if interventions unsuccessful or patient reports new pain  - Anticipate increased pain with activity and pre-medicate as appropriate  Outcome: Progressing     Problem: RISK FOR INFECTION - ADULT  Goal: Absence of fever/infection during anticipated neutropenic period  Description: INTERVENTIONS  - Monitor WBC  - Administer growth factors as ordered  - Implement neutropenic guidelines  Outcome: Progressing     Problem: SAFETY ADULT - FALL  Goal: Free from fall injury  Description: INTERVENTIONS:  - Assess pt frequently for physical needs  - Identify cognitive and physical deficits and behaviors that affect risk of falls.   - [place] fall precautions as indicated by assessment.  - Educate pt/family on patient safety including physical limitations  - Instruct pt to call for assistance with activity based on assessment  - Modify environment to reduce risk of injury  - Provide assistive devices as appropriate  - Consider OT/PT consult to assist with strengthening/mobility  - Encourage toileting schedule  Outcome: Progressing     Problem: DISCHARGE PLANNING  Goal: Discharge to home or other facility with appropriate resources  Description: INTERVENTIONS:  - Identify barriers to discharge w/pt and caregiver  - Include patient/family/discharge partner in discharge planning  - Arrange for needed discharge resources and transportation as appropriate  - Identify discharge learning needs (meds, wound care, etc)  - Arrange for interpreters to assist at discharge as needed  - Consider post-discharge preferences of patient/family/discharge partner  - Complete POLST form as appropriate  - Assess patient's ability to be responsible for managing their own health  - Refer to Case Management Department for coordinating discharge planning if the patient needs post-hospital services based on physician/LIP order or complex needs related to functional status, cognitive ability or social support system  Outcome: Progressing     Problem: Altered Communication/Language Barrier  Goal: Patient/Family is able to understand and participate in their care  Description: Interventions:  - Assess communication ability and preferred communication style  - Implement communication aides and strategies  - Use visual cues when possible  - Listen attentively, be patient, do not interrupt  - Minimize distractions  - Allow time for understanding and response  - Establish method for patient to ask for assistance (call light)  - Provide an  as needed  - Communicate barriers and strategies to overcome with those who interact with patient  Outcome: Progressing     Problem: Patient/Family Goals  Goal: Patient/Family Long Term Goal  Description: Patient's Long Term Goal: to return to NH    Interventions:  - follow MD recommendations  - See additional Care Plan goals for specific interventions  Outcome: Progressing  Goal: Patient/Family Short Term Goal  Description: Patient's Short Term Goal: to feel better    Interventions:   - monitor HGB  - See additional Care Plan goals for specific interventions  Outcome: Progressing     Problem: GASTROINTESTINAL - ADULT  Goal: Minimal or absence of nausea and vomiting  Description: INTERVENTIONS:  - Maintain adequate hydration with IV or PO as ordered and tolerated  - Nasogastric tube to low intermittent suction as ordered  - Evaluate effectiveness of ordered antiemetic medications  - Provide nonpharmacologic comfort measures as appropriate  - Advance diet as tolerated, if ordered  - Obtain nutritional consult as needed  - Evaluate fluid balance  Outcome: Progressing  Goal: Maintains or returns to baseline bowel function  Description: INTERVENTIONS:  - Assess bowel function  - Maintain adequate hydration with IV or PO as ordered and tolerated  - Evaluate effectiveness of GI medications  - Encourage mobilization and activity  - Obtain nutritional consult as needed  - Establish a toileting routine/schedule  - Consider collaborating with pharmacy to review patient's medication profile  Outcome: Progressing     Problem: SKIN/TISSUE INTEGRITY - ADULT  Goal: Skin integrity remains intact  Description: INTERVENTIONS  - Assess and document risk factors for pressure ulcer development  - Assess and document skin integrity  - Monitor for areas of redness and/or skin breakdown  - Initiate interventions, skin care algorithm/standards of care as needed  Outcome: Progressing  Goal: Incision(s), wounds(s) or drain site(s) healing without S/S of infection  Description: INTERVENTIONS:  - Assess and document risk factors for pressure ulcer development  - Assess and document skin integrity  - Assess and document dressing/incision, wound bed, drain sites and surrounding tissue  - Implement wound care per orders  - Initiate isolation precautions as appropriate  - Initiate Pressure Ulcer prevention bundle as indicated  Outcome: Progressing     Problem: NEUROLOGICAL - ADULT  Goal: Achieves stable or improved neurological status  Description: INTERVENTIONS  - Assess for and report changes in neurological status  - Initiate measures to prevent increased intracranial pressure  - Maintain blood pressure and fluid volume within ordered parameters to optimize cerebral perfusion and minimize risk of hemorrhage  - Monitor temperature, glucose, and sodium.  Initiate appropriate interventions as ordered  Outcome: Progressing  Goal: Absence of seizures  Description: INTERVENTIONS  - Monitor for seizure activity  - Administer anti-seizure medications as ordered  - Monitor neurological status  Outcome: Progressing     Problem: Patient Centered Care  Goal: Patient preferences are identified and integrated in the patient's plan of care  Description: Interventions:  - What would you like us to know as we care for you?   - Provide timely, complete, and accurate information to patient/family  - Incorporate patient and family knowledge, values, beliefs, and cultural backgrounds into the planning and delivery of care  - Encourage patient/family to participate in care and decision-making at the level they choose  - Honor patient and family perspectives and choices  Outcome: Progressing

## 2022-08-03 NOTE — PAYOR COMM NOTE
--------------  DISCHARGE REVIEW    Payor: Mario Rae [place] #:  [id number]  Authorization Number: [id number]    Admit date: 22  Admit time:   2:19 PM  Discharge Date: 2022 11:58 AM     Admitting Physician: Analia Guardado MD  Attending Physician:  No att. providers found  Primary Care Physician: Analia Guardaod MD    Discharge Summary signed by Analia Guardado MD at 2022  8:52 AM     Author: Analia Guardado MD Specialty: Internal Medicine Author Type: Physician    Filed: 2022  8:52 AM Date of Service: 2022  8:50 AM Status: Signed    : Analia Guardado MD (Physician)         [facility]  Discharge Summary    Herbert Ramon Patient Status:  Inpatient    1950 MRN [id number]   Location [facility] 5SW/SE Attending Analia Guardado MD   Hosp Day # 3 PCP Marisol Lynch MD, Eloy Huynh MD     Date of Admission: 2022   Date of Discharge: 2022    Admitting Diagnosis: Upper GI bleed [K92.2]  Sacral osteomyelitis ([address] [K34.51]    Disposition: Transfer to [facility]    Discharge Diagnosis: . Principal Problem:    Upper GI bleed  Active Problems:    Sacral osteomyelitis [facility])    Hospital Course:   Reason for Admission: GI bleed  Discharge Physical Exam: General appearance:  alert, appears stated age and cooperative  Pulmonary: clear to auscultation bilaterally  Cardiovascular: S1, S2 normal, no murmur, click, rub or gallop, regular rate and rhythm, S1, S2 normal  Abdominal: soft, non-tender; bowel sounds normal; no masses,  no organomegaly  Extremities: extremities normal, atraumatic, no cyanosis or edema  Pulses: 2+ and symmetric    Hospital Course: Pt seen by GI and ID no abx per ID, per GI no further intervention, H/H stable DC to [facility]    Complications: None    Consultants  Chat With All Active Members    Provider Role Specialty    Tima Dutton MD  Consulting Physician  INFECTIOUS DISEASES    [facility] Marsha Orta MD  Consulting Physician  GASTROENTEROLOGY    Analia Guardado MD Consulting Physician  Internal Medicine        Pending Labs     Order Current Status    Aerobic Bacterial Culture Preliminary result        Discharge Plan:   Discharge Condition: Stable    Current Discharge Medication List    Home Meds - Modified    omeprazole 2 mg/mL Oral Suspension  Take 10 mL (20 mg total) by mouth daily. Home Meds - Unchanged    baclofen 10 MG Oral Tab  Take 10 mg by mouth 3 (three) times daily as needed. bisacodyl 10 MG Rectal Suppos  Place 10 mg rectally every third day as needed (constipation). metoprolol tartrate 25 MG Oral Tab  Take 50 mg by mouth 2 (two) times daily. Hold SBP < 100 or heart rate < 60    Pantoprazole Sodium 40 MG Oral Powd Pack  Take 40 mg by mouth daily. collagenase (SANTYL) 250 UNIT/GM External Ointment  Apply 1 Application topically 2 (two) times a day. To sacral wound    sennosides 8.8 MG/5ML Oral Syrup  Take 5 mL by mouth nightly. PEG 3350 17 g Oral Powd Pack  Take 17 g by mouth daily. Calcium 500-125 MG-UNIT Oral Tab  Take by mouth 2 (two) times a day. atorvastatin 40 MG Oral Tab  Take 40 mg by mouth nightly. docusate sodium 50 MG/5ML Oral Liquid  Take 100 mg by mouth 2 (two) times daily. magnesium oxide 400 MG Oral Tab  Take 400 mg by mouth daily. ferrous sulfate 325 (65 FE) MG Oral Tab EC  Take 325 mg by mouth daily. Multiple Vitamin (MULTI-DAY VITAMINS) Oral Tab  Take by mouth daily. phenytoin 125 MG/5ML Oral Suspension  Take 200 mg by mouth 2 (two) times daily. acetaminophen 325 MG Oral Tab  Take 650 mg by mouth 2 (two) times daily as needed for Pain. Atropine Sulfate 1 % Ophthalmic Solution  1-2 drops SUBLINGUAL- every hour when necessary for excessive secretions    DERMACERIN External Cream  Apply topically as needed. ergocalciferol 23116 units Oral Cap  Take 5,000 Units by mouth once a week.         Discharge Diet: As tolerated    Discharge Activity: As tolerated    Follow up:        Follow up Labs:1 week    Aubrie Rankin MD, Alfreda Payne MD  8/1/2022      REVIEWER COMMENTS    FOR FINAL REVIEW/APPROVAL OF ALL INPT DAYS

## 2022-08-26 ENCOUNTER — HOSPITAL ENCOUNTER (EMERGENCY)
Facility: HOSPITAL | Age: 72
Discharge: HOME | End: 2022-08-26
Attending: EMERGENCY MEDICINE
Payer: MEDICARE

## 2022-08-26 VITALS
DIASTOLIC BLOOD PRESSURE: 98 MMHG | SYSTOLIC BLOOD PRESSURE: 113 MMHG | BODY MASS INDEX: 25 KG/M2 | TEMPERATURE: 97 F | RESPIRATION RATE: 11 BRPM | WEIGHT: 147.06 LBS | OXYGEN SATURATION: 95 % | HEART RATE: 100 BPM

## 2022-08-26 DIAGNOSIS — R11.10 NON-INTRACTABLE VOMITING: Primary | ICD-10-CM

## 2022-08-26 LAB
ALBUMIN SERPL-MCNC: 2.3 G/DL (ref 3.4–5)
ALP LIVER SERPL-CCNC: 165 U/L
ALT SERPL-CCNC: 16 U/L
ANION GAP SERPL CALC-SCNC: 5 MMOL/L (ref 0–18)
AST SERPL-CCNC: 19 U/L (ref 15–37)
BASOPHILS # BLD AUTO: 0.02 X10(3) UL (ref 0–0.2)
BASOPHILS NFR BLD AUTO: 0.4 %
BILIRUB DIRECT SERPL-MCNC: <0.1 MG/DL (ref 0–0.2)
BILIRUB SERPL-MCNC: 0.2 MG/DL (ref 0.1–2)
BILIRUB UR QL CFM: NEGATIVE
BUN BLD-MCNC: 12 MG/DL (ref 7–18)
BUN/CREAT SERPL: 29.3 (ref 10–20)
CALCIUM BLD-MCNC: 8.8 MG/DL (ref 8.5–10.1)
CHLORIDE SERPL-SCNC: 98 MMOL/L (ref 98–112)
CLARITY UR: CLEAR
CO2 SERPL-SCNC: 36 MMOL/L (ref 21–32)
COLOR UR: YELLOW
CREAT BLD-MCNC: 0.41 MG/DL
DEPRECATED RDW RBC AUTO: 53.6 FL (ref 35.1–46.3)
EOSINOPHIL # BLD AUTO: 0.14 X10(3) UL (ref 0–0.7)
EOSINOPHIL NFR BLD AUTO: 3 %
ERYTHROCYTE [DISTWIDTH] IN BLOOD BY AUTOMATED COUNT: 16 % (ref 11–15)
GFR SERPLBLD BASED ON 1.73 SQ M-ARVRAT: 106 ML/MIN/1.73M2 (ref 60–?)
GLUCOSE BLD-MCNC: 92 MG/DL (ref 70–99)
GLUCOSE UR-MCNC: NEGATIVE MG/DL
HCT VFR BLD AUTO: 37.7 %
HGB BLD-MCNC: 11.5 G/DL
HGB UR QL STRIP.AUTO: NEGATIVE
IMM GRANULOCYTES # BLD AUTO: 0.02 X10(3) UL (ref 0–1)
IMM GRANULOCYTES NFR BLD: 0.4 %
KETONES UR-MCNC: NEGATIVE MG/DL
LEUKOCYTE ESTERASE UR QL STRIP.AUTO: NEGATIVE
LIPASE SERPL-CCNC: 111 U/L (ref 73–393)
LYMPHOCYTES # BLD AUTO: 1.11 X10(3) UL (ref 1–4)
LYMPHOCYTES NFR BLD AUTO: 23.9 %
MCH RBC QN AUTO: 27.8 PG (ref 26–34)
MCHC RBC AUTO-ENTMCNC: 30.5 G/DL (ref 31–37)
MCV RBC AUTO: 91.3 FL
MONOCYTES # BLD AUTO: 0.46 X10(3) UL (ref 0.1–1)
MONOCYTES NFR BLD AUTO: 9.9 %
NEUTROPHILS # BLD AUTO: 2.9 X10 (3) UL (ref 1.5–7.7)
NEUTROPHILS # BLD AUTO: 2.9 X10(3) UL (ref 1.5–7.7)
NEUTROPHILS NFR BLD AUTO: 62.4 %
NITRITE UR QL STRIP.AUTO: NEGATIVE
OSMOLALITY SERPL CALC.SUM OF ELEC: 287 MOSM/KG (ref 275–295)
PH UR: 8 [PH] (ref 5–8)
PLATELET # BLD AUTO: 428 10(3)UL (ref 150–450)
POTASSIUM SERPL-SCNC: 3.5 MMOL/L (ref 3.5–5.1)
PROT SERPL-MCNC: 7.1 G/DL (ref 6.4–8.2)
RBC # BLD AUTO: 4.13 X10(6)UL
SODIUM SERPL-SCNC: 139 MMOL/L (ref 136–145)
SP GR UR STRIP: 1.01 (ref 1–1.03)
UROBILINOGEN UR STRIP-ACNC: 2
WBC # BLD AUTO: 4.7 X10(3) UL (ref 4–11)

## 2022-08-26 PROCEDURE — 99283 EMERGENCY DEPT VISIT LOW MDM: CPT

## 2022-08-26 PROCEDURE — 80076 HEPATIC FUNCTION PANEL: CPT | Performed by: EMERGENCY MEDICINE

## 2022-08-26 PROCEDURE — 85025 COMPLETE CBC W/AUTO DIFF WBC: CPT | Performed by: EMERGENCY MEDICINE

## 2022-08-26 PROCEDURE — 80048 BASIC METABOLIC PNL TOTAL CA: CPT | Performed by: EMERGENCY MEDICINE

## 2022-08-26 PROCEDURE — 81015 MICROSCOPIC EXAM OF URINE: CPT | Performed by: EMERGENCY MEDICINE

## 2022-08-26 PROCEDURE — 81001 URINALYSIS AUTO W/SCOPE: CPT | Performed by: EMERGENCY MEDICINE

## 2022-08-26 PROCEDURE — 83690 ASSAY OF LIPASE: CPT | Performed by: EMERGENCY MEDICINE

## 2022-08-26 PROCEDURE — 36415 COLL VENOUS BLD VENIPUNCTURE: CPT

## 2022-08-27 NOTE — ED INITIAL ASSESSMENT (HPI)
A&O X1 quadriplegia patient arrives via EMS from the [facility] complaining of vomiting x2 days. Today the NH staff noticed \"coffee ground\" like emesis.

## 2022-10-06 ENCOUNTER — LAB REQUISITION (OUTPATIENT)
Dept: LAB | Age: 72
End: 2022-10-06

## 2022-10-06 DIAGNOSIS — R56.9 UNSPECIFIED CONVULSIONS (CMD): ICD-10-CM

## 2022-10-06 LAB
BASOPHILS # BLD: 0 K/MCL (ref 0–0.3)
BASOPHILS NFR BLD: 1 %
DEPRECATED RDW RBC: 53.8 FL (ref 39–50)
EOSINOPHIL # BLD: 0 K/MCL (ref 0–0.5)
EOSINOPHIL NFR BLD: 0 %
ERYTHROCYTE [DISTWIDTH] IN BLOOD: 16 % (ref 11–15)
HCT VFR BLD CALC: 31.5 % (ref 36–46.5)
HGB BLD-MCNC: 9.4 G/DL (ref 12–15.5)
IMM GRANULOCYTES # BLD AUTO: 0 K/MCL (ref 0–0.2)
IMM GRANULOCYTES # BLD: 0 %
LYMPHOCYTES # BLD: 1.5 K/MCL (ref 1–4)
LYMPHOCYTES NFR BLD: 25 %
MCH RBC QN AUTO: 27.6 PG (ref 26–34)
MCHC RBC AUTO-ENTMCNC: 29.8 G/DL (ref 32–36.5)
MCV RBC AUTO: 92.4 FL (ref 78–100)
MONOCYTES # BLD: 0.9 K/MCL (ref 0.3–0.9)
MONOCYTES NFR BLD: 15 %
NEUTROPHILS # BLD: 3.6 K/MCL (ref 1.8–7.7)
NEUTROPHILS NFR BLD: 59 %
NRBC BLD MANUAL-RTO: 0 /100 WBC
PLATELET # BLD AUTO: 398 K/MCL (ref 140–450)
RBC # BLD: 3.41 MIL/MCL (ref 4–5.2)
WBC # BLD: 6 K/MCL (ref 4.2–11)

## 2022-10-06 PROCEDURE — 80053 COMPREHEN METABOLIC PANEL: CPT | Performed by: CLINICAL MEDICAL LABORATORY

## 2022-10-06 PROCEDURE — 85025 COMPLETE CBC W/AUTO DIFF WBC: CPT | Performed by: CLINICAL MEDICAL LABORATORY

## 2022-10-06 PROCEDURE — PSEU8250 COMPREHENSIVE METABOLIC PANEL: Performed by: CLINICAL MEDICAL LABORATORY

## 2022-10-07 LAB
ALBUMIN SERPL-MCNC: 1.8 G/DL (ref 3.6–5.1)
ALBUMIN/GLOB SERPL: 0.5 {RATIO} (ref 1–2.4)
ALP SERPL-CCNC: 131 UNITS/L (ref 45–117)
ALT SERPL-CCNC: 20 UNITS/L
ANION GAP SERPL CALC-SCNC: 11 MMOL/L (ref 7–19)
AST SERPL-CCNC: 40 UNITS/L
BILIRUB SERPL-MCNC: 0.2 MG/DL (ref 0.2–1)
BUN SERPL-MCNC: 33 MG/DL (ref 6–20)
BUN/CREAT SERPL: 77 (ref 7–25)
CALCIUM SERPL-MCNC: 8.5 MG/DL (ref 8.4–10.2)
CHLORIDE SERPL-SCNC: 99 MMOL/L (ref 97–110)
CO2 SERPL-SCNC: 36 MMOL/L (ref 21–32)
CREAT SERPL-MCNC: 0.43 MG/DL (ref 0.51–0.95)
FASTING DURATION TIME PATIENT: ABNORMAL H
GFR SERPLBLD BASED ON 1.73 SQ M-ARVRAT: >90 ML/MIN
GLOBULIN SER-MCNC: 3.6 G/DL (ref 2–4)
GLUCOSE SERPL-MCNC: 153 MG/DL (ref 70–99)
POTASSIUM SERPL-SCNC: 3.3 MMOL/L (ref 3.4–5.1)
PROT SERPL-MCNC: 5.4 G/DL (ref 6.4–8.2)
SODIUM SERPL-SCNC: 143 MMOL/L (ref 135–145)

## 2022-10-15 ENCOUNTER — APPOINTMENT (OUTPATIENT)
Dept: GENERAL RADIOLOGY | Facility: HOSPITAL | Age: 72
End: 2022-10-15
Attending: EMERGENCY MEDICINE
Payer: MEDICARE

## 2022-10-15 ENCOUNTER — HOSPITAL ENCOUNTER (INPATIENT)
Facility: HOSPITAL | Age: 72
LOS: 4 days | Discharge: NURSING, REHAB OR LONG-TERM CARE | End: 2022-10-19
Attending: EMERGENCY MEDICINE | Admitting: INTERNAL MEDICINE
Payer: MEDICARE

## 2022-10-15 DIAGNOSIS — E86.0 DEHYDRATION: ICD-10-CM

## 2022-10-15 DIAGNOSIS — R50.9 FEBRILE ILLNESS, ACUTE: Primary | ICD-10-CM

## 2022-10-15 DIAGNOSIS — E87.20 LACTIC ACIDEMIA: ICD-10-CM

## 2022-10-15 LAB
ADENOVIRUS PCR:: NOT DETECTED
ALBUMIN SERPL-MCNC: 1.7 G/DL (ref 3.4–5)
ALBUMIN SERPL-MCNC: 1.7 G/DL (ref 3.4–5)
ALP LIVER SERPL-CCNC: 114 U/L
ALP LIVER SERPL-CCNC: 121 U/L
ALT SERPL-CCNC: 31 U/L
ALT SERPL-CCNC: 34 U/L
ANION GAP SERPL CALC-SCNC: 6 MMOL/L (ref 0–18)
APTT PPP: 29.4 SECONDS (ref 23.3–35.6)
AST SERPL-CCNC: 33 U/L (ref 15–37)
AST SERPL-CCNC: 37 U/L (ref 15–37)
B PARAPERT DNA SPEC QL NAA+PROBE: NOT DETECTED
B PERT DNA SPEC QL NAA+PROBE: NOT DETECTED
BASOPHILS # BLD AUTO: 0.04 X10(3) UL (ref 0–0.2)
BASOPHILS NFR BLD AUTO: 0.5 %
BILIRUB DIRECT SERPL-MCNC: 0.1 MG/DL (ref 0–0.2)
BILIRUB DIRECT SERPL-MCNC: 0.1 MG/DL (ref 0–0.2)
BILIRUB SERPL-MCNC: 0.2 MG/DL (ref 0.1–2)
BILIRUB SERPL-MCNC: 0.2 MG/DL (ref 0.1–2)
BILIRUB UR QL: NEGATIVE
BUN BLD-MCNC: 38 MG/DL (ref 7–18)
BUN/CREAT SERPL: 71.7 (ref 10–20)
C DIFF TOX B STL QL: POSITIVE
C PNEUM DNA SPEC QL NAA+PROBE: NOT DETECTED
CALCIUM BLD-MCNC: 8.1 MG/DL (ref 8.5–10.1)
CHLORIDE SERPL-SCNC: 106 MMOL/L (ref 98–112)
CLARITY UR: CLEAR
CO2 SERPL-SCNC: 34 MMOL/L (ref 21–32)
COLOR UR: YELLOW
CORONAVIRUS 229E PCR:: NOT DETECTED
CORONAVIRUS HKU1 PCR:: NOT DETECTED
CORONAVIRUS NL63 PCR:: NOT DETECTED
CORONAVIRUS OC43 PCR:: NOT DETECTED
CREAT BLD-MCNC: 0.53 MG/DL
DEPRECATED RDW RBC AUTO: 52.6 FL (ref 35.1–46.3)
EOSINOPHIL # BLD AUTO: 0.11 X10(3) UL (ref 0–0.7)
EOSINOPHIL NFR BLD AUTO: 1.5 %
ERYTHROCYTE [DISTWIDTH] IN BLOOD BY AUTOMATED COUNT: 15.9 % (ref 11–15)
FLUAV RNA SPEC QL NAA+PROBE: NOT DETECTED
FLUBV RNA SPEC QL NAA+PROBE: NOT DETECTED
GFR SERPLBLD BASED ON 1.73 SQ M-ARVRAT: 99 ML/MIN/1.73M2 (ref 60–?)
GLUCOSE BLD-MCNC: 180 MG/DL (ref 70–99)
GLUCOSE BLDC GLUCOMTR-MCNC: 134 MG/DL (ref 70–99)
GLUCOSE BLDC GLUCOMTR-MCNC: 144 MG/DL (ref 70–99)
GLUCOSE UR-MCNC: NEGATIVE MG/DL
HCT VFR BLD AUTO: 27.9 %
HGB BLD-MCNC: 8 G/DL
HGB UR QL STRIP.AUTO: NEGATIVE
IMM GRANULOCYTES # BLD AUTO: 0.22 X10(3) UL (ref 0–1)
IMM GRANULOCYTES NFR BLD: 2.9 %
INR BLD: 1.1 (ref 0.85–1.16)
LACTATE SERPL-SCNC: 1.7 MMOL/L (ref 0.4–2)
LACTATE SERPL-SCNC: 2.2 MMOL/L (ref 0.4–2)
LACTATE SERPL-SCNC: 3 MMOL/L (ref 0.4–2)
LYMPHOCYTES # BLD AUTO: 0.51 X10(3) UL (ref 1–4)
LYMPHOCYTES NFR BLD AUTO: 6.8 %
MCH RBC QN AUTO: 26 PG (ref 26–34)
MCHC RBC AUTO-ENTMCNC: 28.7 G/DL (ref 31–37)
MCV RBC AUTO: 90.6 FL
METAPNEUMOVIRUS PCR:: NOT DETECTED
MONOCYTES # BLD AUTO: 0.31 X10(3) UL (ref 0.1–1)
MONOCYTES NFR BLD AUTO: 4.1 %
MYCOPLASMA PNEUMONIA PCR:: NOT DETECTED
NEUTROPHILS # BLD AUTO: 6.35 X10 (3) UL (ref 1.5–7.7)
NEUTROPHILS # BLD AUTO: 6.35 X10(3) UL (ref 1.5–7.7)
NEUTROPHILS NFR BLD AUTO: 84.2 %
NITRITE UR QL STRIP.AUTO: NEGATIVE
NT-PROBNP SERPL-MCNC: 79 PG/ML (ref ?–125)
OSMOLALITY SERPL CALC.SUM OF ELEC: 316 MOSM/KG (ref 275–295)
PARAINFLUENZA 1 PCR:: NOT DETECTED
PARAINFLUENZA 2 PCR:: NOT DETECTED
PARAINFLUENZA 3 PCR:: NOT DETECTED
PARAINFLUENZA 4 PCR:: NOT DETECTED
PH UR: 6.5 [PH] (ref 5–8)
PLATELET # BLD AUTO: 252 10(3)UL (ref 150–450)
POTASSIUM SERPL-SCNC: 3.5 MMOL/L (ref 3.5–5.1)
PROCALCITONIN SERPL-MCNC: 3.1 NG/ML (ref ?–0.16)
PROT SERPL-MCNC: 5.9 G/DL (ref 6.4–8.2)
PROT SERPL-MCNC: 6 G/DL (ref 6.4–8.2)
PROTHROMBIN TIME: 14.1 SECONDS (ref 11.6–14.8)
RBC # BLD AUTO: 3.08 X10(6)UL
RHINOVIRUS/ENTERO PCR:: NOT DETECTED
RSV RNA SPEC QL NAA+PROBE: NOT DETECTED
SARS-COV-2 RNA NPH QL NAA+NON-PROBE: NOT DETECTED
SARS-COV-2 RNA RESP QL NAA+PROBE: NOT DETECTED
SODIUM SERPL-SCNC: 146 MMOL/L (ref 136–145)
SP GR UR STRIP: 1.02 (ref 1–1.03)
TROPONIN I HIGH SENSITIVITY: 18 NG/L
UROBILINOGEN UR STRIP-ACNC: 1
WBC # BLD AUTO: 7.5 X10(3) UL (ref 4–11)

## 2022-10-15 PROCEDURE — 85730 THROMBOPLASTIN TIME PARTIAL: CPT | Performed by: EMERGENCY MEDICINE

## 2022-10-15 PROCEDURE — 80048 BASIC METABOLIC PNL TOTAL CA: CPT | Performed by: EMERGENCY MEDICINE

## 2022-10-15 PROCEDURE — 87040 BLOOD CULTURE FOR BACTERIA: CPT | Performed by: EMERGENCY MEDICINE

## 2022-10-15 PROCEDURE — 99285 EMERGENCY DEPT VISIT HI MDM: CPT

## 2022-10-15 PROCEDURE — 96361 HYDRATE IV INFUSION ADD-ON: CPT

## 2022-10-15 PROCEDURE — 36415 COLL VENOUS BLD VENIPUNCTURE: CPT

## 2022-10-15 PROCEDURE — 82962 GLUCOSE BLOOD TEST: CPT

## 2022-10-15 PROCEDURE — 83605 ASSAY OF LACTIC ACID: CPT | Performed by: EMERGENCY MEDICINE

## 2022-10-15 PROCEDURE — 96365 THER/PROPH/DIAG IV INF INIT: CPT

## 2022-10-15 PROCEDURE — 85025 COMPLETE CBC W/AUTO DIFF WBC: CPT | Performed by: EMERGENCY MEDICINE

## 2022-10-15 PROCEDURE — 85610 PROTHROMBIN TIME: CPT | Performed by: EMERGENCY MEDICINE

## 2022-10-15 PROCEDURE — 83880 ASSAY OF NATRIURETIC PEPTIDE: CPT | Performed by: EMERGENCY MEDICINE

## 2022-10-15 PROCEDURE — 87493 C DIFF AMPLIFIED PROBE: CPT | Performed by: INTERNAL MEDICINE

## 2022-10-15 PROCEDURE — 0202U NFCT DS 22 TRGT SARS-COV-2: CPT | Performed by: INTERNAL MEDICINE

## 2022-10-15 PROCEDURE — 87086 URINE CULTURE/COLONY COUNT: CPT | Performed by: EMERGENCY MEDICINE

## 2022-10-15 PROCEDURE — 84145 PROCALCITONIN (PCT): CPT | Performed by: EMERGENCY MEDICINE

## 2022-10-15 PROCEDURE — 71045 X-RAY EXAM CHEST 1 VIEW: CPT | Performed by: EMERGENCY MEDICINE

## 2022-10-15 PROCEDURE — 81015 MICROSCOPIC EXAM OF URINE: CPT | Performed by: EMERGENCY MEDICINE

## 2022-10-15 PROCEDURE — 84484 ASSAY OF TROPONIN QUANT: CPT | Performed by: EMERGENCY MEDICINE

## 2022-10-15 PROCEDURE — 80076 HEPATIC FUNCTION PANEL: CPT | Performed by: EMERGENCY MEDICINE

## 2022-10-15 PROCEDURE — 83605 ASSAY OF LACTIC ACID: CPT | Performed by: INTERNAL MEDICINE

## 2022-10-15 PROCEDURE — 81001 URINALYSIS AUTO W/SCOPE: CPT | Performed by: EMERGENCY MEDICINE

## 2022-10-15 RX ORDER — ZINC SULFATE 50(220)MG
220 CAPSULE ORAL DAILY
Status: DISCONTINUED | OUTPATIENT
Start: 2022-10-16 | End: 2022-10-19

## 2022-10-15 RX ORDER — SODIUM BICARBONATE 650 MG/1
325 TABLET ORAL AS NEEDED
Status: DISCONTINUED | OUTPATIENT
Start: 2022-10-15 | End: 2022-10-19

## 2022-10-15 RX ORDER — SENNOSIDES 8.8 MG/5ML
5 LIQUID ORAL NIGHTLY
Status: DISCONTINUED | OUTPATIENT
Start: 2022-10-15 | End: 2022-10-19

## 2022-10-15 RX ORDER — ASCORBIC ACID 500 MG
500 TABLET ORAL DAILY
Status: DISCONTINUED | OUTPATIENT
Start: 2022-10-16 | End: 2022-10-19

## 2022-10-15 RX ORDER — METOPROLOL TARTRATE 50 MG/1
50 TABLET, FILM COATED ORAL 2 TIMES DAILY
Status: DISCONTINUED | OUTPATIENT
Start: 2022-10-15 | End: 2022-10-19

## 2022-10-15 RX ORDER — MELATONIN
AS NEEDED
Status: DISCONTINUED | OUTPATIENT
Start: 2022-10-15 | End: 2022-10-19

## 2022-10-15 RX ORDER — MAGNESIUM OXIDE 400 MG/1
400 TABLET ORAL DAILY
Status: DISCONTINUED | OUTPATIENT
Start: 2022-10-15 | End: 2022-10-19

## 2022-10-15 RX ORDER — DOXEPIN HYDROCHLORIDE 50 MG/1
1 CAPSULE ORAL DAILY
Status: DISCONTINUED | OUTPATIENT
Start: 2022-10-16 | End: 2022-10-19

## 2022-10-15 RX ORDER — ERGOCALCIFEROL 1.25 MG/1
50000 CAPSULE ORAL WEEKLY
Status: DISCONTINUED | OUTPATIENT
Start: 2022-10-16 | End: 2022-10-19

## 2022-10-15 RX ORDER — PHENYTOIN 125 MG/5ML
200 SUSPENSION ORAL 2 TIMES DAILY
Status: DISCONTINUED | OUTPATIENT
Start: 2022-10-15 | End: 2022-10-19

## 2022-10-15 RX ORDER — VANCOMYCIN 2 GRAM/500 ML IN 0.9 % SODIUM CHLORIDE INTRAVENOUS
25 ONCE
Status: COMPLETED | OUTPATIENT
Start: 2022-10-15 | End: 2022-10-15

## 2022-10-15 RX ORDER — BACLOFEN 10 MG/1
10 TABLET ORAL 3 TIMES DAILY PRN
Status: DISCONTINUED | OUTPATIENT
Start: 2022-10-15 | End: 2022-10-19

## 2022-10-15 RX ORDER — ACETAMINOPHEN 650 MG/1
650 SUPPOSITORY RECTAL ONCE
Status: DISCONTINUED | OUTPATIENT
Start: 2022-10-15 | End: 2022-10-15

## 2022-10-15 RX ORDER — ACETAMINOPHEN 650 MG/1
650 SUPPOSITORY RECTAL ONCE
Status: COMPLETED | OUTPATIENT
Start: 2022-10-15 | End: 2022-10-15

## 2022-10-15 RX ORDER — POLYETHYLENE GLYCOL 3350 17 G/17G
17 POWDER, FOR SOLUTION ORAL DAILY
Status: DISCONTINUED | OUTPATIENT
Start: 2022-10-16 | End: 2022-10-19

## 2022-10-15 RX ORDER — BISACODYL 10 MG
10 SUPPOSITORY, RECTAL RECTAL
Status: DISCONTINUED | OUTPATIENT
Start: 2022-10-15 | End: 2022-10-19

## 2022-10-15 RX ORDER — FERROUS SULFATE 300 MG/5ML
300 LIQUID (ML) ORAL DAILY
Status: DISCONTINUED | OUTPATIENT
Start: 2022-10-16 | End: 2022-10-19

## 2022-10-15 RX ORDER — ATROPINE SULFATE 10 MG/ML
1 SOLUTION/ DROPS OPHTHALMIC
Status: DISCONTINUED | OUTPATIENT
Start: 2022-10-15 | End: 2022-10-19

## 2022-10-15 RX ORDER — PANTOPRAZOLE SODIUM 40 MG/1
40 TABLET, DELAYED RELEASE ORAL
Status: DISCONTINUED | OUTPATIENT
Start: 2022-10-16 | End: 2022-10-15

## 2022-10-15 RX ORDER — ACETAMINOPHEN 325 MG/1
650 TABLET ORAL 2 TIMES DAILY PRN
Status: DISCONTINUED | OUTPATIENT
Start: 2022-10-15 | End: 2022-10-19

## 2022-10-15 RX ORDER — ACETAMINOPHEN 160 MG/5ML
975 SOLUTION ORAL ONCE
Status: DISCONTINUED | OUTPATIENT
Start: 2022-10-15 | End: 2022-10-19

## 2022-10-15 RX ORDER — ATORVASTATIN CALCIUM 40 MG/1
40 TABLET, FILM COATED ORAL NIGHTLY
Status: DISCONTINUED | OUTPATIENT
Start: 2022-10-15 | End: 2022-10-19

## 2022-10-15 RX ORDER — DEXTROSE AND SODIUM CHLORIDE 5; .45 G/100ML; G/100ML
INJECTION, SOLUTION INTRAVENOUS CONTINUOUS
Status: DISCONTINUED | OUTPATIENT
Start: 2022-10-15 | End: 2022-10-19

## 2022-10-15 NOTE — PLAN OF CARE
Luke Park is from the [place], Aox0, very lethargic, IV fluids infusing, IV abx infusing, sacral wound dressed, q2h turns, digestive enzymes given to unblock tube, tube feedings resumed at 50 ml/hr, with plans to advance, head of the bed maintained at 30 degree minimum, pt suctioned frequently, PRN medication given, blood cultures pending, purewick in place. Problem: Patient Centered Care  Goal: Patient preferences are identified and integrated in the patient's plan of care  Description: Interventions:  - What would you like us to know as we care for you?  Family member Nichole Rodriguez is guardian  - Provide timely, complete, and accurate information to patient/family  - Incorporate patient and family knowledge, values, beliefs, and cultural backgrounds into the planning and delivery of care  - Encourage patient/family to participate in care and decision-making at the level they choose  - Honor patient and family perspectives and choices  Outcome: Progressing     Problem: Patient/Family Goals  Goal: Patient/Family Long Term Goal  Description: Patient's Long Term Goal: Identify source of infection and treat    Interventions:  - Complete all tests  - take medication  - monitor vital signs  - See additional Care Plan goals for specific interventions  Outcome: Progressing  Goal: Patient/Family Short Term Goal  Description: Patient's Short Term Goal: Unblock the G-tube    Interventions:   - Use medication to unblock g-tube  - See additional Care Plan goals for specific interventions  Outcome: Progressing     Problem: CARDIOVASCULAR - ADULT  Goal: Maintains optimal cardiac output and hemodynamic stability  Description: INTERVENTIONS:  - Monitor vital signs, rhythm, and trends  - Monitor for bleeding, hypotension and signs of decreased cardiac output  - Evaluate effectiveness of vasoactive medications to optimize hemodynamic stability  - Monitor arterial and/or venous puncture sites for bleeding and/or hematoma  - Assess quality of pulses, skin color and temperature  - Assess for signs of decreased coronary artery perfusion - ex.  Angina  - Evaluate fluid balance, assess for edema, trend weights  Outcome: Progressing  Goal: Absence of cardiac arrhythmias or at baseline  Description: INTERVENTIONS:  - Continuous cardiac monitoring, monitor vital signs, obtain 12 lead EKG if indicated  - Evaluate effectiveness of antiarrhythmic and heart rate control medications as ordered  - Initiate emergency measures for life threatening arrhythmias  - Monitor electrolytes and administer replacement therapy as ordered  Outcome: Progressing     Problem: RESPIRATORY - ADULT  Goal: Achieves optimal ventilation and oxygenation  Description: INTERVENTIONS:  - Assess for changes in respiratory status  - Assess for changes in mentation and behavior  - Position to facilitate oxygenation and minimize respiratory effort  - Oxygen supplementation based on oxygen saturation or ABGs  - Provide Smoking Cessation handout, if applicable  - Encourage broncho-pulmonary hygiene including cough, deep breathe, Incentive Spirometry  - Assess the need for suctioning and perform as needed  - Assess and instruct to report SOB or any respiratory difficulty  - Respiratory Therapy support as indicated  - Manage/alleviate anxiety  - Monitor for signs/symptoms of CO2 retention  Outcome: Progressing     Problem: NEUROLOGICAL - ADULT  Goal: Absence of seizures  Description: INTERVENTIONS  - Monitor for seizure activity  - Administer anti-seizure medications as ordered  - Monitor neurological status  Outcome: Progressing  Goal: Remains free of injury related to seizure activity  Description: INTERVENTIONS:  - Maintain airway, patient safety  and administer oxygen as ordered  - Monitor patient for seizure activity, document and report duration and description of seizure to MD/LIP  - If seizure occurs, turn patient to side and suction secretions as needed  - Reorient patient post seizure  - Seizure pads on all 4 side rails  - Instruct patient/family to notify RN of any seizure activity  - Instruct patient/family to call for assistance with activity based on assessment  Outcome: Progressing

## 2022-10-15 NOTE — CM/SW NOTE
Confirmed with patient's guardian (FAROOQ) that she is a LTC resident from the [place] [address].  Referral sent via [facility] to the [facility].     Zaid GAMEZN RN [address]  RN Case Manager  [phone]

## 2022-10-15 NOTE — PROGRESS NOTES
[facility] Dosing Service    Initial Pharmacokinetic Consult for Vancomycin AUC Dosing    Louann Garza is a 80year old patient who is being treated for pneumonia. Pharmacy has been asked to dose vancomycin by JENNIFER Pena. Weights:  Ideal body weight: 63.9 kg (140 lb 14 oz)  Adjusted ideal body weight: 65.7 kg (144 lb 14.2 oz)  Actual weight:  68.4 kg (150 lb 14.4 oz)    Labs:  Lab Results   Component Value Date    CREATSERUM 0.53 10/15/2022      CrCl:  Estimated Creatinine Clearance: 99.6 mL/min (A) (based on SCr of 0.53 mg/dL (L)). Based on the above:    1. This patient has received a loading dose of Vancomycin  2000 mg IVPB (25mg/kg, capped at 2000 mg) x 1 dose. This will be followed by 1000 mg Q 12 hours based upon actual body weight of 68.4 kg and renal function. 2. Vancomycin peak and trough will be obtained at steady state in order to calculate AUC24. Goal AUC24 is 400-600 mg-h/L.    3. Pharmacy will order SCr as clinically indicated while on vancomycin to assess renal function. 4. Pharmacy will follow and monitor renal function, toxicity and efficacy. We appreciate the opportunity to assist in the care of this patient.     Gisele Yao, Ximena  10/15/2022  12:05 PM  [address] Extension: [phone]

## 2022-10-15 NOTE — ED INITIAL ASSESSMENT (HPI)
EMS states that they were called because of pt having shortness of breath.  States that NH staff have poor report and they don't have much imformayion

## 2022-10-15 NOTE — ED QUICK NOTES
Orders for admission, patient is aware of plan and ready to go upstairs. Any questions, please call ED RN [address] at extension [phone].      Patient Covid vaccination status: Unvaccinated     COVID Test Ordered in ED: Rapid SARS-CoV-2 by PCR    COVID Suspicion at Admission: N/A    Running Infusions:  0.9ns  Mental Status/LOC at time of transport: unresponsive    Other pertinent information:   CIWA score: N/A   NIH score:  N/A

## 2022-10-16 LAB
ANION GAP SERPL CALC-SCNC: 4 MMOL/L (ref 0–18)
ANTIBODY SCREEN: NEGATIVE
BASOPHILS # BLD: 0 X10(3) UL (ref 0–0.2)
BASOPHILS NFR BLD: 0 %
BUN BLD-MCNC: 27 MG/DL (ref 7–18)
BUN/CREAT SERPL: 87.1 (ref 10–20)
CALCIUM BLD-MCNC: 7.7 MG/DL (ref 8.5–10.1)
CHLORIDE SERPL-SCNC: 111 MMOL/L (ref 98–112)
CO2 SERPL-SCNC: 31 MMOL/L (ref 21–32)
CREAT BLD-MCNC: 0.31 MG/DL
DEPRECATED RDW RBC AUTO: 53.1 FL (ref 35.1–46.3)
EOSINOPHIL # BLD: 0 X10(3) UL (ref 0–0.7)
EOSINOPHIL NFR BLD: 0 %
ERYTHROCYTE [DISTWIDTH] IN BLOOD BY AUTOMATED COUNT: 15.8 % (ref 11–15)
GFR SERPLBLD BASED ON 1.73 SQ M-ARVRAT: 113 ML/MIN/1.73M2 (ref 60–?)
GLUCOSE BLD-MCNC: 137 MG/DL (ref 70–99)
GLUCOSE BLDC GLUCOMTR-MCNC: 124 MG/DL (ref 70–99)
GLUCOSE BLDC GLUCOMTR-MCNC: 133 MG/DL (ref 70–99)
GLUCOSE BLDC GLUCOMTR-MCNC: 162 MG/DL (ref 70–99)
GLUCOSE BLDC GLUCOMTR-MCNC: 174 MG/DL (ref 70–99)
HCT VFR BLD AUTO: 23.2 %
HCT VFR BLD AUTO: 28.2 %
HGB BLD-MCNC: 6.6 G/DL
HGB BLD-MCNC: 8.4 G/DL
LYMPHOCYTES NFR BLD: 0.48 X10(3) UL (ref 1–4)
LYMPHOCYTES NFR BLD: 8 %
MAGNESIUM SERPL-MCNC: 2.4 MG/DL (ref 1.6–2.6)
MCH RBC QN AUTO: 26.2 PG (ref 26–34)
MCHC RBC AUTO-ENTMCNC: 28.4 G/DL (ref 31–37)
MCV RBC AUTO: 92.1 FL
METAMYELOCYTES # BLD: 0.18 X10(3) UL
METAMYELOCYTES NFR BLD: 3 %
MONOCYTES # BLD: 0.18 X10(3) UL (ref 0.1–1)
MONOCYTES NFR BLD: 3 %
MORPHOLOGY: NORMAL
NEUTROPHILS # BLD AUTO: 4.36 X10 (3) UL (ref 1.5–7.7)
NEUTROPHILS NFR BLD: 83 %
NEUTS BAND NFR BLD: 3 %
NEUTS HYPERSEG # BLD: 5.16 X10(3) UL (ref 1.5–7.7)
OSMOLALITY SERPL CALC.SUM OF ELEC: 309 MOSM/KG (ref 275–295)
PHOSPHATE SERPL-MCNC: 2.2 MG/DL (ref 2.5–4.9)
PLATELET # BLD AUTO: 221 10(3)UL (ref 150–450)
PLATELET MORPHOLOGY: NORMAL
POTASSIUM SERPL-SCNC: 3.6 MMOL/L (ref 3.5–5.1)
RBC # BLD AUTO: 2.52 X10(6)UL
RH BLOOD TYPE: POSITIVE
SODIUM SERPL-SCNC: 146 MMOL/L (ref 136–145)
TOTAL CELLS COUNTED BLD: 100
WBC # BLD AUTO: 6 X10(3) UL (ref 4–11)

## 2022-10-16 PROCEDURE — 85007 BL SMEAR W/DIFF WBC COUNT: CPT | Performed by: PHYSICIAN ASSISTANT

## 2022-10-16 PROCEDURE — 86920 COMPATIBILITY TEST SPIN: CPT

## 2022-10-16 PROCEDURE — 85018 HEMOGLOBIN: CPT | Performed by: INTERNAL MEDICINE

## 2022-10-16 PROCEDURE — 83735 ASSAY OF MAGNESIUM: CPT | Performed by: INTERNAL MEDICINE

## 2022-10-16 PROCEDURE — 85014 HEMATOCRIT: CPT | Performed by: INTERNAL MEDICINE

## 2022-10-16 PROCEDURE — 86850 RBC ANTIBODY SCREEN: CPT | Performed by: INTERNAL MEDICINE

## 2022-10-16 PROCEDURE — 85027 COMPLETE CBC AUTOMATED: CPT | Performed by: PHYSICIAN ASSISTANT

## 2022-10-16 PROCEDURE — 85060 BLOOD SMEAR INTERPRETATION: CPT | Performed by: PHYSICIAN ASSISTANT

## 2022-10-16 PROCEDURE — 82962 GLUCOSE BLOOD TEST: CPT

## 2022-10-16 PROCEDURE — 30233N1 TRANSFUSION OF NONAUTOLOGOUS RED BLOOD CELLS INTO PERIPHERAL VEIN, PERCUTANEOUS APPROACH: ICD-10-PCS | Performed by: INTERNAL MEDICINE

## 2022-10-16 PROCEDURE — 36430 TRANSFUSION BLD/BLD COMPNT: CPT

## 2022-10-16 PROCEDURE — 86901 BLOOD TYPING SEROLOGIC RH(D): CPT | Performed by: INTERNAL MEDICINE

## 2022-10-16 PROCEDURE — 86900 BLOOD TYPING SEROLOGIC ABO: CPT | Performed by: INTERNAL MEDICINE

## 2022-10-16 PROCEDURE — 80048 BASIC METABOLIC PNL TOTAL CA: CPT | Performed by: INTERNAL MEDICINE

## 2022-10-16 PROCEDURE — 84100 ASSAY OF PHOSPHORUS: CPT | Performed by: INTERNAL MEDICINE

## 2022-10-16 PROCEDURE — 85025 COMPLETE CBC W/AUTO DIFF WBC: CPT | Performed by: PHYSICIAN ASSISTANT

## 2022-10-16 RX ORDER — VANCOMYCIN HYDROCHLORIDE 125 MG/1
125 CAPSULE ORAL EVERY 6 HOURS
Status: CANCELLED | OUTPATIENT
Start: 2022-10-16

## 2022-10-16 RX ORDER — VANCOMYCIN HYDROCHLORIDE 125 MG/1
125 CAPSULE ORAL EVERY 6 HOURS
Status: DISCONTINUED | OUTPATIENT
Start: 2022-10-16 | End: 2022-10-16

## 2022-10-16 RX ORDER — SODIUM CHLORIDE 9 MG/ML
INJECTION, SOLUTION INTRAVENOUS ONCE
Status: COMPLETED | OUTPATIENT
Start: 2022-10-16 | End: 2022-10-16

## 2022-10-16 NOTE — PLAN OF CARE
Patient afebrile all night. PEG tube intact and tolerated feeding well and running at goal of 70 ml/hr. Suctioned prn. Repositioned Q2. Sacral wound change. Air mattress in place. Kept on isolation for C. Diff infection. Problem: Patient Centered Care  Goal: Patient preferences are identified and integrated in the patient's plan of care  Description: Interventions:  - What would you like us to know as we care for you? Family member Ruma Juarez is guardian  - Provide timely, complete, and accurate information to patient/family  - Incorporate patient and family knowledge, values, beliefs, and cultural backgrounds into the planning and delivery of care  - Encourage patient/family to participate in care and decision-making at the level they choose  - Honor patient and family perspectives and choices  Outcome: Progressing     Problem: Patient/Family Goals  Goal: Patient/Family Long Term Goal  Description: Patient's Long Term Goal: Identify source of infection and treat    Interventions:  - Complete all tests  - take medication  - monitor vital signs  - See additional Care Plan goals for specific interventions  Outcome: Progressing  Goal: Patient/Family Short Term Goal  Description: Patient's Short Term Goal: Unblock the G-tube    Interventions:   - Use medication to unblock g-tube  - See additional Care Plan goals for specific interventions  Outcome: Progressing     Problem: CARDIOVASCULAR - ADULT  Goal: Maintains optimal cardiac output and hemodynamic stability  Description: INTERVENTIONS:  - Monitor vital signs, rhythm, and trends  - Monitor for bleeding, hypotension and signs of decreased cardiac output  - Evaluate effectiveness of vasoactive medications to optimize hemodynamic stability  - Monitor arterial and/or venous puncture sites for bleeding and/or hematoma  - Assess quality of pulses, skin color and temperature  - Assess for signs of decreased coronary artery perfusion - ex.  Angina  - Evaluate fluid balance, assess for edema, trend weights  Outcome: Progressing  Goal: Absence of cardiac arrhythmias or at baseline  Description: INTERVENTIONS:  - Continuous cardiac monitoring, monitor vital signs, obtain 12 lead EKG if indicated  - Evaluate effectiveness of antiarrhythmic and heart rate control medications as ordered  - Initiate emergency measures for life threatening arrhythmias  - Monitor electrolytes and administer replacement therapy as ordered  Outcome: Progressing     Problem: RESPIRATORY - ADULT  Goal: Achieves optimal ventilation and oxygenation  Description: INTERVENTIONS:  - Assess for changes in respiratory status  - Assess for changes in mentation and behavior  - Position to facilitate oxygenation and minimize respiratory effort  - Oxygen supplementation based on oxygen saturation or ABGs  - Provide Smoking Cessation handout, if applicable  - Encourage broncho-pulmonary hygiene including cough, deep breathe, Incentive Spirometry  - Assess the need for suctioning and perform as needed  - Assess and instruct to report SOB or any respiratory difficulty  - Respiratory Therapy support as indicated  - Manage/alleviate anxiety  - Monitor for signs/symptoms of CO2 retention  Outcome: Progressing     Problem: NEUROLOGICAL - ADULT  Goal: Absence of seizures  Description: INTERVENTIONS  - Monitor for seizure activity  - Administer anti-seizure medications as ordered  - Monitor neurological status  Outcome: Progressing  Goal: Remains free of injury related to seizure activity  Description: INTERVENTIONS:  - Maintain airway, patient safety  and administer oxygen as ordered  - Monitor patient for seizure activity, document and report duration and description of seizure to MD/LIP  - If seizure occurs, turn patient to side and suction secretions as needed  - Reorient patient post seizure  - Seizure pads on all 4 side rails  - Instruct patient/family to notify RN of any seizure activity  - Instruct patient/family to call for assistance with activity based on assessment  Outcome: Progressing     Problem: SKIN/TISSUE INTEGRITY - ADULT  Goal: Skin integrity remains intact  Description: INTERVENTIONS  - Assess and document risk factors for pressure ulcer development  - Assess and document skin integrity  - Monitor for areas of redness and/or skin breakdown  - Initiate interventions, skin care algorithm/standards of care as needed  Outcome: Progressing  Goal: Incision(s), wounds(s) or drain site(s) healing without S/S of infection  Description: INTERVENTIONS:  - Assess and document risk factors for pressure ulcer development  - Assess and document skin integrity  - Assess and document dressing/incision, wound bed, drain sites and surrounding tissue  - Implement wound care per orders  - Initiate isolation precautions as appropriate  - Initiate Pressure Ulcer prevention bundle as indicated  Outcome: Progressing  Goal: Oral mucous membranes remain intact  Description: INTERVENTIONS  - Assess oral mucosa and hygiene practices  - Implement preventative oral hygiene regimen  - Implement oral medicated treatments as ordered  Outcome: Progressing     Problem: MUSCULOSKELETAL - ADULT  Goal: Return mobility to safest level of function  Description: INTERVENTIONS:  - Assess patient stability and activity tolerance for standing, transferring and ambulating w/ or w/o assistive devices  - Assist with transfers and ambulation using safe patient handling equipment as needed  - Ensure adequate protection for wounds/incisions during mobilization  - Obtain PT/OT consults as needed  - Advance activity as appropriate  - Communicate ordered activity level and limitations with patient/family  Outcome: Progressing     Problem: Impaired Swallowing  Goal: Minimize aspiration risk  Description: Interventions:  - Patient should be alert and upright for all feedings (90 degrees preferred)  - Offer food and liquids at a slow rate  - No straws  - Encourage small bites of food and small sips of liquid  - Offer pills one at a time, crush or deliver with applesauce as needed  - Discontinue feeding and notify MD (or speech pathologist) if coughing or persistent throat clearing or wet/gurgly vocal quality is noted  Outcome: Progressing

## 2022-10-16 NOTE — OCCUPATIONAL THERAPY NOTE
OT order was recieved from mobility screening, chart review was completed. Upon discussion with RN and reviewing EMR; pt is a NH resident at [facility], with HX of TBI, non-verbal, has PEG tube in place, dependent for transfers and ADLs and at baseline. Skilled OT intervention is not indicated at this time. DC IP OT orders. Should pt's needs change requiring skilled intervention, please re-order OT.   Thank you for your referral.    Jean-Paul Agustin OTR/L  [id number]  [facility]

## 2022-10-17 ENCOUNTER — APPOINTMENT (OUTPATIENT)
Dept: GENERAL RADIOLOGY | Facility: HOSPITAL | Age: 72
End: 2022-10-17
Attending: INTERNAL MEDICINE
Payer: MEDICARE

## 2022-10-17 LAB
ANION GAP SERPL CALC-SCNC: 4 MMOL/L (ref 0–18)
BLOOD TYPE BARCODE: 7300
BLOOD TYPE BARCODE: 7300
BUN BLD-MCNC: 21 MG/DL (ref 7–18)
BUN/CREAT SERPL: 77.8 (ref 10–20)
CALCIUM BLD-MCNC: 7.9 MG/DL (ref 8.5–10.1)
CHLORIDE SERPL-SCNC: 110 MMOL/L (ref 98–112)
CO2 SERPL-SCNC: 29 MMOL/L (ref 21–32)
CREAT BLD-MCNC: 0.27 MG/DL
DEPRECATED RDW RBC AUTO: 48.6 FL (ref 35.1–46.3)
ERYTHROCYTE [DISTWIDTH] IN BLOOD BY AUTOMATED COUNT: 15.1 % (ref 11–15)
GFR SERPLBLD BASED ON 1.73 SQ M-ARVRAT: 117 ML/MIN/1.73M2 (ref 60–?)
GLUCOSE BLD-MCNC: 107 MG/DL (ref 70–99)
GLUCOSE BLDC GLUCOMTR-MCNC: 106 MG/DL (ref 70–99)
GLUCOSE BLDC GLUCOMTR-MCNC: 116 MG/DL (ref 70–99)
GLUCOSE BLDC GLUCOMTR-MCNC: 94 MG/DL (ref 70–99)
HCT VFR BLD AUTO: 28.3 %
HGB BLD-MCNC: 8.6 G/DL
MCH RBC QN AUTO: 27 PG (ref 26–34)
MCHC RBC AUTO-ENTMCNC: 30.4 G/DL (ref 31–37)
MCV RBC AUTO: 89 FL
OSMOLALITY SERPL CALC.SUM OF ELEC: 299 MOSM/KG (ref 275–295)
PLATELET # BLD AUTO: 247 10(3)UL (ref 150–450)
POTASSIUM SERPL-SCNC: 3.4 MMOL/L (ref 3.5–5.1)
RBC # BLD AUTO: 3.18 X10(6)UL
SODIUM SERPL-SCNC: 143 MMOL/L (ref 136–145)
VANCOMYCIN PEAK SERPL-MCNC: 21.8 UG/ML (ref 30–50)
VANCOMYCIN TROUGH SERPL-MCNC: 14.4 UG/ML (ref 10–20)
WBC # BLD AUTO: 7 X10(3) UL (ref 4–11)

## 2022-10-17 PROCEDURE — 80202 ASSAY OF VANCOMYCIN: CPT | Performed by: INTERNAL MEDICINE

## 2022-10-17 PROCEDURE — 99213 OFFICE O/P EST LOW 20 MIN: CPT

## 2022-10-17 PROCEDURE — 85027 COMPLETE CBC AUTOMATED: CPT | Performed by: INTERNAL MEDICINE

## 2022-10-17 PROCEDURE — 80048 BASIC METABOLIC PNL TOTAL CA: CPT | Performed by: INTERNAL MEDICINE

## 2022-10-17 PROCEDURE — 82962 GLUCOSE BLOOD TEST: CPT

## 2022-10-17 PROCEDURE — 71045 X-RAY EXAM CHEST 1 VIEW: CPT | Performed by: INTERNAL MEDICINE

## 2022-10-17 NOTE — PLAN OF CARE
Keven Tafoya nonverbal, IV fluids infusing, max assist, episode of emesis this AM, tube feedings paused and residual checked, no indication of pain, tube feedings at goal.       Problem: Patient Centered Care  Goal: Patient preferences are identified and integrated in the patient's plan of care  Description: Interventions:  - What would you like us to know as we care for you? Family member Minh Crowder is guardian  - Provide timely, complete, and accurate information to patient/family  - Incorporate patient and family knowledge, values, beliefs, and cultural backgrounds into the planning and delivery of care  - Encourage patient/family to participate in care and decision-making at the level they choose  - Honor patient and family perspectives and choices  Outcome: Progressing     Problem: Patient/Family Goals  Goal: Patient/Family Long Term Goal  Description: Patient's Long Term Goal: Identify source of infection and treat    Interventions:  - Complete all tests  - take medication  - monitor vital signs  - See additional Care Plan goals for specific interventions  Outcome: Progressing  Goal: Patient/Family Short Term Goal  Description: Patient's Short Term Goal: Unblock the G-tube    Interventions:   - Use medication to unblock g-tube  - See additional Care Plan goals for specific interventions  Outcome: Progressing     Problem: CARDIOVASCULAR - ADULT  Goal: Maintains optimal cardiac output and hemodynamic stability  Description: INTERVENTIONS:  - Monitor vital signs, rhythm, and trends  - Monitor for bleeding, hypotension and signs of decreased cardiac output  - Evaluate effectiveness of vasoactive medications to optimize hemodynamic stability  - Monitor arterial and/or venous puncture sites for bleeding and/or hematoma  - Assess quality of pulses, skin color and temperature  - Assess for signs of decreased coronary artery perfusion - ex.  Angina  - Evaluate fluid balance, assess for edema, trend weights  Outcome: Progressing  Goal: Absence of cardiac arrhythmias or at baseline  Description: INTERVENTIONS:  - Continuous cardiac monitoring, monitor vital signs, obtain 12 lead EKG if indicated  - Evaluate effectiveness of antiarrhythmic and heart rate control medications as ordered  - Initiate emergency measures for life threatening arrhythmias  - Monitor electrolytes and administer replacement therapy as ordered  Outcome: Progressing     Problem: RESPIRATORY - ADULT  Goal: Achieves optimal ventilation and oxygenation  Description: INTERVENTIONS:  - Assess for changes in respiratory status  - Assess for changes in mentation and behavior  - Position to facilitate oxygenation and minimize respiratory effort  - Oxygen supplementation based on oxygen saturation or ABGs  - Provide Smoking Cessation handout, if applicable  - Encourage broncho-pulmonary hygiene including cough, deep breathe, Incentive Spirometry  - Assess the need for suctioning and perform as needed  - Assess and instruct to report SOB or any respiratory difficulty  - Respiratory Therapy support as indicated  - Manage/alleviate anxiety  - Monitor for signs/symptoms of CO2 retention  Outcome: Progressing     Problem: NEUROLOGICAL - ADULT  Goal: Absence of seizures  Description: INTERVENTIONS  - Monitor for seizure activity  - Administer anti-seizure medications as ordered  - Monitor neurological status  Outcome: Progressing  Goal: Remains free of injury related to seizure activity  Description: INTERVENTIONS:  - Maintain airway, patient safety  and administer oxygen as ordered  - Monitor patient for seizure activity, document and report duration and description of seizure to MD/LIP  - If seizure occurs, turn patient to side and suction secretions as needed  - Reorient patient post seizure  - Seizure pads on all 4 side rails  - Instruct patient/family to notify RN of any seizure activity  - Instruct patient/family to call for assistance with activity based on assessment  Outcome: Progressing     Problem: SKIN/TISSUE INTEGRITY - ADULT  Goal: Skin integrity remains intact  Description: INTERVENTIONS  - Assess and document risk factors for pressure ulcer development  - Assess and document skin integrity  - Monitor for areas of redness and/or skin breakdown  - Initiate interventions, skin care algorithm/standards of care as needed  Outcome: Progressing  Goal: Incision(s), wounds(s) or drain site(s) healing without S/S of infection  Description: INTERVENTIONS:  - Assess and document risk factors for pressure ulcer development  - Assess and document skin integrity  - Assess and document dressing/incision, wound bed, drain sites and surrounding tissue  - Implement wound care per orders  - Initiate isolation precautions as appropriate  - Initiate Pressure Ulcer prevention bundle as indicated  Outcome: Progressing  Goal: Oral mucous membranes remain intact  Description: INTERVENTIONS  - Assess oral mucosa and hygiene practices  - Implement preventative oral hygiene regimen  - Implement oral medicated treatments as ordered  Outcome: Progressing     Problem: MUSCULOSKELETAL - ADULT  Goal: Return mobility to safest level of function  Description: INTERVENTIONS:  - Assess patient stability and activity tolerance for standing, transferring and ambulating w/ or w/o assistive devices  - Assist with transfers and ambulation using safe patient handling equipment as needed  - Ensure adequate protection for wounds/incisions during mobilization  - Obtain PT/OT consults as needed  - Advance activity as appropriate  - Communicate ordered activity level and limitations with patient/family  Outcome: Progressing     Problem: Impaired Swallowing  Goal: Minimize aspiration risk  Description: Interventions:  - Patient should be alert and upright for all feedings (90 degrees preferred)  - Offer food and liquids at a slow rate  - No straws  - Encourage small bites of food and small sips of liquid  - Offer pills one at a time, crush or deliver with applesauce as needed  - Discontinue feeding and notify MD (or speech pathologist) if coughing or persistent throat clearing or wet/gurgly vocal quality is noted  Outcome: Progressing

## 2022-10-17 NOTE — PHYSICAL THERAPY NOTE
PT orders received from functional mobility screening, chart reviewed. Spoke with BRANDON OWEN. Pt is at baseline level for mobility. Pt is a NH resident at [facility]. Pt has hx of TBI, is non-verbal, is contracted and dependent for transfers. Pt has PEG tube in place and is also dependent for ADLs. Skilled PT intervention is not appropriate at this time. Should something change requiring skilled PT intervention, please reorder PT. Thank you.

## 2022-10-17 NOTE — PLAN OF CARE
Pt is nonverbal, JENNI orientation, maximum assist. TF paused until 2330 for Dilantin, flushed q4 per order. Sacrum dressing change completed @ 2330. 1 BM tonight. Call light within reach, bed on lowest setting, siderails x4 up and padded, hourly rounds maintained, q2 turns. Problem: CARDIOVASCULAR - ADULT  Goal: Maintains optimal cardiac output and hemodynamic stability  Description: INTERVENTIONS:  - Monitor vital signs, rhythm, and trends  - Monitor for bleeding, hypotension and signs of decreased cardiac output  - Evaluate effectiveness of vasoactive medications to optimize hemodynamic stability  - Monitor arterial and/or venous puncture sites for bleeding and/or hematoma  - Assess quality of pulses, skin color and temperature  - Assess for signs of decreased coronary artery perfusion - ex.  Angina  - Evaluate fluid balance, assess for edema, trend weights  Outcome: Progressing  Goal: Absence of cardiac arrhythmias or at baseline  Description: INTERVENTIONS:  - Continuous cardiac monitoring, monitor vital signs, obtain 12 lead EKG if indicated  - Evaluate effectiveness of antiarrhythmic and heart rate control medications as ordered  - Initiate emergency measures for life threatening arrhythmias  - Monitor electrolytes and administer replacement therapy as ordered  Outcome: Progressing     Problem: RESPIRATORY - ADULT  Goal: Achieves optimal ventilation and oxygenation  Description: INTERVENTIONS:  - Assess for changes in respiratory status  - Assess for changes in mentation and behavior  - Position to facilitate oxygenation and minimize respiratory effort  - Oxygen supplementation based on oxygen saturation or ABGs  - Provide Smoking Cessation handout, if applicable  - Encourage broncho-pulmonary hygiene including cough, deep breathe, Incentive Spirometry  - Assess the need for suctioning and perform as needed  - Assess and instruct to report SOB or any respiratory difficulty  - Respiratory Therapy support as indicated  - Manage/alleviate anxiety  - Monitor for signs/symptoms of CO2 retention  Outcome: Progressing     Problem: NEUROLOGICAL - ADULT  Goal: Absence of seizures  Description: INTERVENTIONS  - Monitor for seizure activity  - Administer anti-seizure medications as ordered  - Monitor neurological status  Outcome: Progressing  Goal: Remains free of injury related to seizure activity  Description: INTERVENTIONS:  - Maintain airway, patient safety  and administer oxygen as ordered  - Monitor patient for seizure activity, document and report duration and description of seizure to MD/LIP  - If seizure occurs, turn patient to side and suction secretions as needed  - Reorient patient post seizure  - Seizure pads on all 4 side rails  - Instruct patient/family to notify RN of any seizure activity  - Instruct patient/family to call for assistance with activity based on assessment  Outcome: Progressing     Problem: SKIN/TISSUE INTEGRITY - ADULT  Goal: Incision(s), wounds(s) or drain site(s) healing without S/S of infection  Description: INTERVENTIONS:  - Assess and document risk factors for pressure ulcer development  - Assess and document skin integrity  - Assess and document dressing/incision, wound bed, drain sites and surrounding tissue  - Implement wound care per orders  - Initiate isolation precautions as appropriate  - Initiate Pressure Ulcer prevention bundle as indicated  Outcome: Progressing     Problem: Impaired Swallowing  Goal: Minimize aspiration risk  Description: Interventions:  - Patient should be alert and upright for all feedings (90 degrees preferred)  - Offer food and liquids at a slow rate  - No straws  - Encourage small bites of food and small sips of liquid  - Offer pills one at a time, crush or deliver with applesauce as needed  - Discontinue feeding and notify MD (or speech pathologist) if coughing or persistent throat clearing or wet/gurgly vocal quality is noted  Outcome: Progressing

## 2022-10-18 LAB
GLUCOSE BLDC GLUCOMTR-MCNC: 106 MG/DL (ref 70–99)
GLUCOSE BLDC GLUCOMTR-MCNC: 132 MG/DL (ref 70–99)
GLUCOSE BLDC GLUCOMTR-MCNC: 132 MG/DL (ref 70–99)
GLUCOSE BLDC GLUCOMTR-MCNC: 91 MG/DL (ref 70–99)
POTASSIUM SERPL-SCNC: 4.5 MMOL/L (ref 3.5–5.1)

## 2022-10-18 PROCEDURE — 84132 ASSAY OF SERUM POTASSIUM: CPT | Performed by: INTERNAL MEDICINE

## 2022-10-18 PROCEDURE — 82962 GLUCOSE BLOOD TEST: CPT

## 2022-10-18 NOTE — CM/SW NOTE
TAYO spoke with Fazal from [address] [place] [address] who confirmed they do not have a bed for this patient tonight due to Cdff. They have to make some room changes and will have a bed tomorrow. [facility] ambulance is on will call and PCS is complete with tomorrows date. The pt's guardian will need to be notified of discharge once confirmed.       [facility] ext [phone]

## 2022-10-18 NOTE — PLAN OF CARE
Patient is nonverbal, unable to follow commands. G tube in place with tube feedings per orders. Accucheck q6h. NPO. Wound dressing changed per order. IVF @ 75 ml/hr. IV antibiotics stopped today. Plan is discharge tomorrow. Will continue to monitor. Problem: Patient Centered Care  Goal: Patient preferences are identified and integrated in the patient's plan of care  Description: Interventions:  - What would you like us to know as we care for you? Family member Yoel Hyde is guardian  - Provide timely, complete, and accurate information to patient/family  - Incorporate patient and family knowledge, values, beliefs, and cultural backgrounds into the planning and delivery of care  - Encourage patient/family to participate in care and decision-making at the level they choose  - Honor patient and family perspectives and choices  Outcome: Progressing     Problem: Patient/Family Goals  Goal: Patient/Family Long Term Goal  Description: Patient's Long Term Goal: Identify source of infection and treat    Interventions:  - Complete all tests  - take medication  - monitor vital signs  - See additional Care Plan goals for specific interventions  Outcome: Progressing  Goal: Patient/Family Short Term Goal  Description: Patient's Short Term Goal: Unblock the G-tube    Interventions:   - Use medication to unblock g-tube  - See additional Care Plan goals for specific interventions  Outcome: Progressing     Problem: CARDIOVASCULAR - ADULT  Goal: Maintains optimal cardiac output and hemodynamic stability  Description: INTERVENTIONS:  - Monitor vital signs, rhythm, and trends  - Monitor for bleeding, hypotension and signs of decreased cardiac output  - Evaluate effectiveness of vasoactive medications to optimize hemodynamic stability  - Monitor arterial and/or venous puncture sites for bleeding and/or hematoma  - Assess quality of pulses, skin color and temperature  - Assess for signs of decreased coronary artery perfusion - ex. Angina  - Evaluate fluid balance, assess for edema, trend weights  Outcome: Progressing  Goal: Absence of cardiac arrhythmias or at baseline  Description: INTERVENTIONS:  - Continuous cardiac monitoring, monitor vital signs, obtain 12 lead EKG if indicated  - Evaluate effectiveness of antiarrhythmic and heart rate control medications as ordered  - Initiate emergency measures for life threatening arrhythmias  - Monitor electrolytes and administer replacement therapy as ordered  Outcome: Progressing     Problem: RESPIRATORY - ADULT  Goal: Achieves optimal ventilation and oxygenation  Description: INTERVENTIONS:  - Assess for changes in respiratory status  - Assess for changes in mentation and behavior  - Position to facilitate oxygenation and minimize respiratory effort  - Oxygen supplementation based on oxygen saturation or ABGs  - Provide Smoking Cessation handout, if applicable  - Encourage broncho-pulmonary hygiene including cough, deep breathe, Incentive Spirometry  - Assess the need for suctioning and perform as needed  - Assess and instruct to report SOB or any respiratory difficulty  - Respiratory Therapy support as indicated  - Manage/alleviate anxiety  - Monitor for signs/symptoms of CO2 retention  Outcome: Progressing     Problem: NEUROLOGICAL - ADULT  Goal: Absence of seizures  Description: INTERVENTIONS  - Monitor for seizure activity  - Administer anti-seizure medications as ordered  - Monitor neurological status  Outcome: Progressing  Goal: Remains free of injury related to seizure activity  Description: INTERVENTIONS:  - Maintain airway, patient safety  and administer oxygen as ordered  - Monitor patient for seizure activity, document and report duration and description of seizure to MD/LIP  - If seizure occurs, turn patient to side and suction secretions as needed  - Reorient patient post seizure  - Seizure pads on all 4 side rails  - Instruct patient/family to notify RN of any seizure activity  - Instruct patient/family to call for assistance with activity based on assessment  Outcome: Progressing     Problem: SKIN/TISSUE INTEGRITY - ADULT  Goal: Skin integrity remains intact  Description: INTERVENTIONS  - Assess and document risk factors for pressure ulcer development  - Assess and document skin integrity  - Monitor for areas of redness and/or skin breakdown  - Initiate interventions, skin care algorithm/standards of care as needed  Outcome: Progressing  Goal: Incision(s), wounds(s) or drain site(s) healing without S/S of infection  Description: INTERVENTIONS:  - Assess and document risk factors for pressure ulcer development  - Assess and document skin integrity  - Assess and document dressing/incision, wound bed, drain sites and surrounding tissue  - Implement wound care per orders  - Initiate isolation precautions as appropriate  - Initiate Pressure Ulcer prevention bundle as indicated  Outcome: Progressing  Goal: Oral mucous membranes remain intact  Description: INTERVENTIONS  - Assess oral mucosa and hygiene practices  - Implement preventative oral hygiene regimen  - Implement oral medicated treatments as ordered  Outcome: Progressing     Problem: MUSCULOSKELETAL - ADULT  Goal: Return mobility to safest level of function  Description: INTERVENTIONS:  - Assess patient stability and activity tolerance for standing, transferring and ambulating w/ or w/o assistive devices  - Assist with transfers and ambulation using safe patient handling equipment as needed  - Ensure adequate protection for wounds/incisions during mobilization  - Obtain PT/OT consults as needed  - Advance activity as appropriate  - Communicate ordered activity level and limitations with patient/family  Outcome: Progressing     Problem: Impaired Swallowing  Goal: Minimize aspiration risk  Description: Interventions:  - Patient should be alert and upright for all feedings (90 degrees preferred)  - Offer food and liquids at a slow rate  - No straws  - Encourage small bites of food and small sips of liquid  - Offer pills one at a time, crush or deliver with applesauce as needed  - Discontinue feeding and notify MD (or speech pathologist) if coughing or persistent throat clearing or wet/gurgly vocal quality is noted  Outcome: Progressing

## 2022-10-18 NOTE — PLAN OF CARE
Pt received in no acute distress. Tube feeds and flushes continued. Mouth suctioned and swabbed PRN. Bed in lowest position, hourly rounds maintained. Problem: CARDIOVASCULAR - ADULT  Goal: Maintains optimal cardiac output and hemodynamic stability  Description: INTERVENTIONS:  - Monitor vital signs, rhythm, and trends  - Monitor for bleeding, hypotension and signs of decreased cardiac output  - Evaluate effectiveness of vasoactive medications to optimize hemodynamic stability  - Monitor arterial and/or venous puncture sites for bleeding and/or hematoma  - Assess quality of pulses, skin color and temperature  - Assess for signs of decreased coronary artery perfusion - ex.  Angina  - Evaluate fluid balance, assess for edema, trend weights  Outcome: Progressing  Goal: Absence of cardiac arrhythmias or at baseline  Description: INTERVENTIONS:  - Continuous cardiac monitoring, monitor vital signs, obtain 12 lead EKG if indicated  - Evaluate effectiveness of antiarrhythmic and heart rate control medications as ordered  - Initiate emergency measures for life threatening arrhythmias  - Monitor electrolytes and administer replacement therapy as ordered  Outcome: Progressing     Problem: RESPIRATORY - ADULT  Goal: Achieves optimal ventilation and oxygenation  Description: INTERVENTIONS:  - Assess for changes in respiratory status  - Assess for changes in mentation and behavior  - Position to facilitate oxygenation and minimize respiratory effort  - Oxygen supplementation based on oxygen saturation or ABGs  - Provide Smoking Cessation handout, if applicable  - Encourage broncho-pulmonary hygiene including cough, deep breathe, Incentive Spirometry  - Assess the need for suctioning and perform as needed  - Assess and instruct to report SOB or any respiratory difficulty  - Respiratory Therapy support as indicated  - Manage/alleviate anxiety  - Monitor for signs/symptoms of CO2 retention  Outcome: Progressing     Problem: NEUROLOGICAL - ADULT  Goal: Absence of seizures  Description: INTERVENTIONS  - Monitor for seizure activity  - Administer anti-seizure medications as ordered  - Monitor neurological status  Outcome: Progressing  Goal: Remains free of injury related to seizure activity  Description: INTERVENTIONS:  - Maintain airway, patient safety  and administer oxygen as ordered  - Monitor patient for seizure activity, document and report duration and description of seizure to MD/LIP  - If seizure occurs, turn patient to side and suction secretions as needed  - Reorient patient post seizure  - Seizure pads on all 4 side rails  - Instruct patient/family to notify RN of any seizure activity  - Instruct patient/family to call for assistance with activity based on assessment  Outcome: Progressing     Problem: SKIN/TISSUE INTEGRITY - ADULT  Goal: Incision(s), wounds(s) or drain site(s) healing without S/S of infection  Description: INTERVENTIONS:  - Assess and document risk factors for pressure ulcer development  - Assess and document skin integrity  - Assess and document dressing/incision, wound bed, drain sites and surrounding tissue  - Implement wound care per orders  - Initiate isolation precautions as appropriate  - Initiate Pressure Ulcer prevention bundle as indicated  Outcome: Progressing  Goal: Oral mucous membranes remain intact  Description: INTERVENTIONS  - Assess oral mucosa and hygiene practices  - Implement preventative oral hygiene regimen  - Implement oral medicated treatments as ordered  Outcome: Progressing     Problem: Impaired Swallowing  Goal: Minimize aspiration risk  Description: Interventions:  - Patient should be alert and upright for all feedings (90 degrees preferred)  - Offer food and liquids at a slow rate  - No straws  - Encourage small bites of food and small sips of liquid  - Offer pills one at a time, crush or deliver with applesauce as needed  - Discontinue feeding and notify MD (or speech pathologist) if coughing or persistent throat clearing or wet/gurgly vocal quality is noted  Outcome: Progressing

## 2022-10-18 NOTE — DISCHARGE SUMMARY
[facility]    Discharge Summary    Galina Vega Patient Status:  Inpatient    1950 MRN [id number]   Location [facility] 3W/SW Attending Missy Clark MD   Hosp Day # 3 PCP Heavenly Kahn MD, Nguyen Ni MD     Date of Admission: 10/15/2022   Date of Discharge: 10/19/2022    Admitting Diagnosis: Dehydration [E86.0]  Febrile illness, acute [R50.9]  Lactic acidemia [E87.20]    Disposition: Transfer to [facility]    Discharge Diagnosis: . Principal Problem:    Febrile illness, acute  Active Problems:    Dehydration    Lactic acidemia      Hospital Course:   Reason for Admission: Fever    Discharge Physical Exam: General appearance:  alert, appears stated age and cooperative  Pulmonary: clear to auscultation bilaterally  Cardiovascular: S1, S2 normal, no murmur, click, rub or gallop, regular rate and rhythm, S1, S2 normal  Abdominal: soft, non-tender; bowel sounds normal; no masses,  no organomegaly  Extremities: extremities normal, atraumatic, no cyanosis or edema  Pulses: 2+ and symmetric    Hospital Course: Pt treated with IV abx, doing better DC to SNIF on PO vanco x 2 weeks    Complications: None    Consultants  Chat With All Active Members    Provider Role Specialty    Filippo Domínguez MD  Consulting Physician  INFECTIOUS DISEASES    Dianelys Rondon DO  Consulting Physician  INFECTIOUS DISEASES    Missy Clark MD Consulting Physician  Internal Medicine          Pending Labs     Order Current Status    Blood Culture Preliminary result    Blood Culture Preliminary result          Discharge Plan:   Discharge Condition: Stable    Current Discharge Medication List    New Orders    vancomycin 50 MG/ML Oral Recon Soln  2.5 mL (125 mg total) by Per G Tube route every 6 (six) hours. Home Meds - Unchanged    collagenase (SANTYL) 250 UNIT/GM External Ointment  Apply 1 Application topically 2 (two) times a day.  To sacral wound    atorvastatin 40 MG Oral Tab  Take 40 mg by mouth nightly. omeprazole 2 mg/mL Oral Suspension  Take 10 mL (20 mg total) by mouth daily. baclofen 10 MG Oral Tab  Take 10 mg by mouth 3 (three) times daily as needed. bisacodyl 10 MG Rectal Suppos  Place 10 mg rectally every third day as needed (constipation). metoprolol tartrate 25 MG Oral Tab  Take 50 mg by mouth 2 (two) times daily. Hold SBP < 100 or heart rate < 60    Pantoprazole Sodium 40 MG Oral Powd Pack  Take 40 mg by mouth daily. sennosides 8.8 MG/5ML Oral Syrup  Take 5 mL by mouth nightly. PEG 3350 17 g Oral Powd Pack  Take 17 g by mouth daily. Calcium 500-125 MG-UNIT Oral Tab  Take by mouth 2 (two) times a day. docusate sodium 50 MG/5ML Oral Liquid  Take 100 mg by mouth 2 (two) times daily. magnesium oxide 400 MG Oral Tab  Take 400 mg by mouth daily. DERMACERIN External Cream  Apply topically as needed. ergocalciferol 58066 units Oral Cap  Take 5,000 Units by mouth once a week. ferrous sulfate 325 (65 FE) MG Oral Tab EC  Take 325 mg by mouth daily. Multiple Vitamin (MULTI-DAY VITAMINS) Oral Tab  Take by mouth daily. phenytoin 125 MG/5ML Oral Suspension  Take 200 mg by mouth 2 (two) times daily. acetaminophen 325 MG Oral Tab  Take 650 mg by mouth 2 (two) times daily as needed for Pain. Atropine Sulfate 1 % Ophthalmic Solution  1-2 drops SUBLINGUAL- every hour when necessary for excessive secretions              Discharge Diet: As tolerated    Discharge Activity: As tolerated    Follow up:        Follow up Labs: 1 week        Vijay Felix MD, Nina Carranza MD  10/18/2022

## 2022-10-19 VITALS
SYSTOLIC BLOOD PRESSURE: 120 MMHG | OXYGEN SATURATION: 100 % | DIASTOLIC BLOOD PRESSURE: 80 MMHG | HEIGHT: 68 IN | TEMPERATURE: 98 F | RESPIRATION RATE: 24 BRPM | HEART RATE: 100 BPM | BODY MASS INDEX: 24.53 KG/M2 | WEIGHT: 161.88 LBS

## 2022-10-19 LAB
GLUCOSE BLDC GLUCOMTR-MCNC: 109 MG/DL (ref 70–99)
GLUCOSE BLDC GLUCOMTR-MCNC: 113 MG/DL (ref 70–99)
GLUCOSE BLDC GLUCOMTR-MCNC: 134 MG/DL (ref 70–99)
SARS-COV-2 RNA RESP QL NAA+PROBE: NOT DETECTED

## 2022-10-19 PROCEDURE — 82962 GLUCOSE BLOOD TEST: CPT

## 2022-10-19 NOTE — CM/SW NOTE
10/19/22 0900   Discharge disposition   Expected discharge disposition Skilled Nurs   [address] Provider [facility]   Discharge transportation [facility]     Received iris from [address] - agree to DC at [address] [facility] today. SW updated pt's RN/Danae and DC RN Chantal Moseley during RN rounds. RN to complete rapid COVID. [facility] [address] [facility]. PCS completed in [facility] [address] - pt's RN to print w/ pt's AVS.    TAYO updated pt's guardian Leny Francois via phone - agreeable to DC plans and time.     Report phone #: [phone]    PLAN: [facility] Ambulance set for [id number] [facility], PCS completed        PATRIZIA Quijano, [address] [facility]

## 2022-10-19 NOTE — PLAN OF CARE
Haydee Ortez is nonverbal. 3L O2 in place. Vitals stable. Incontinent x2, purewick in place. Gtube with continuous feedings per orders. NPO. Oral Care PRN. Dressings changed per orders, Repsitioned overnight as tolerated. Accuchecks Q6hr. IVF continued. Plan to discharge back to [facility] possibly today pending bed availability. Problem: Patient Centered Care  Goal: Patient preferences are identified and integrated in the patient's plan of care  Description: Interventions:  - What would you like us to know as we care for you?  Family member Reginald Scheuermann is guardian  - Provide timely, complete, and accurate information to patient/family  - Incorporate patient and family knowledge, values, beliefs, and cultural backgrounds into the planning and delivery of care  - Encourage patient/family to participate in care and decision-making at the level they choose  - Honor patient and family perspectives and choices  Outcome: Progressing     Problem: Patient/Family Goals  Goal: Patient/Family Long Term Goal  Description: Patient's Long Term Goal: Identify source of infection and treat    Interventions:  - Complete all tests  - take medication  - monitor vital signs  - See additional Care Plan goals for specific interventions  Outcome: Progressing  Goal: Patient/Family Short Term Goal  Description: Patient's Short Term Goal: Unblock the G-tube    Interventions:   - Use medication to unblock g-tube  - See additional Care Plan goals for specific interventions  Outcome: Progressing     Problem: CARDIOVASCULAR - ADULT  Goal: Maintains optimal cardiac output and hemodynamic stability  Description: INTERVENTIONS:  - Monitor vital signs, rhythm, and trends  - Monitor for bleeding, hypotension and signs of decreased cardiac output  - Evaluate effectiveness of vasoactive medications to optimize hemodynamic stability  - Monitor arterial and/or venous puncture sites for bleeding and/or hematoma  - Assess quality of pulses, skin color and temperature  - Assess for signs of decreased coronary artery perfusion - ex.  Angina  - Evaluate fluid balance, assess for edema, trend weights  Outcome: Progressing  Goal: Absence of cardiac arrhythmias or at baseline  Description: INTERVENTIONS:  - Continuous cardiac monitoring, monitor vital signs, obtain 12 lead EKG if indicated  - Evaluate effectiveness of antiarrhythmic and heart rate control medications as ordered  - Initiate emergency measures for life threatening arrhythmias  - Monitor electrolytes and administer replacement therapy as ordered  Outcome: Progressing     Problem: RESPIRATORY - ADULT  Goal: Achieves optimal ventilation and oxygenation  Description: INTERVENTIONS:  - Assess for changes in respiratory status  - Assess for changes in mentation and behavior  - Position to facilitate oxygenation and minimize respiratory effort  - Oxygen supplementation based on oxygen saturation or ABGs  - Provide Smoking Cessation handout, if applicable  - Encourage broncho-pulmonary hygiene including cough, deep breathe, Incentive Spirometry  - Assess the need for suctioning and perform as needed  - Assess and instruct to report SOB or any respiratory difficulty  - Respiratory Therapy support as indicated  - Manage/alleviate anxiety  - Monitor for signs/symptoms of CO2 retention  Outcome: Progressing     Problem: NEUROLOGICAL - ADULT  Goal: Absence of seizures  Description: INTERVENTIONS  - Monitor for seizure activity  - Administer anti-seizure medications as ordered  - Monitor neurological status  Outcome: Progressing  Goal: Remains free of injury related to seizure activity  Description: INTERVENTIONS:  - Maintain airway, patient safety  and administer oxygen as ordered  - Monitor patient for seizure activity, document and report duration and description of seizure to MD/LIP  - If seizure occurs, turn patient to side and suction secretions as needed  - Reorient patient post seizure  - Seizure pads on all 4 side rails  - Instruct patient/family to notify RN of any seizure activity  - Instruct patient/family to call for assistance with activity based on assessment  Outcome: Progressing     Problem: SKIN/TISSUE INTEGRITY - ADULT  Goal: Skin integrity remains intact  Description: INTERVENTIONS  - Assess and document risk factors for pressure ulcer development  - Assess and document skin integrity  - Monitor for areas of redness and/or skin breakdown  - Initiate interventions, skin care algorithm/standards of care as needed  Outcome: Progressing  Goal: Incision(s), wounds(s) or drain site(s) healing without S/S of infection  Description: INTERVENTIONS:  - Assess and document risk factors for pressure ulcer development  - Assess and document skin integrity  - Assess and document dressing/incision, wound bed, drain sites and surrounding tissue  - Implement wound care per orders  - Initiate isolation precautions as appropriate  - Initiate Pressure Ulcer prevention bundle as indicated  Outcome: Progressing  Goal: Oral mucous membranes remain intact  Description: INTERVENTIONS  - Assess oral mucosa and hygiene practices  - Implement preventative oral hygiene regimen  - Implement oral medicated treatments as ordered  Outcome: Progressing     Problem: MUSCULOSKELETAL - ADULT  Goal: Return mobility to safest level of function  Description: INTERVENTIONS:  - Assess patient stability and activity tolerance for standing, transferring and ambulating w/ or w/o assistive devices  - Assist with transfers and ambulation using safe patient handling equipment as needed  - Ensure adequate protection for wounds/incisions during mobilization  - Obtain PT/OT consults as needed  - Advance activity as appropriate  - Communicate ordered activity level and limitations with patient/family  Outcome: Progressing     Problem: Impaired Swallowing  Goal: Minimize aspiration risk  Description: Interventions:  - Patient should be alert and upright for all feedings (90 degrees preferred)  - Offer food and liquids at a slow rate  - No straws  - Encourage small bites of food and small sips of liquid  - Offer pills one at a time, crush or deliver with applesauce as needed  - Discontinue feeding and notify MD (or speech pathologist) if coughing or persistent throat clearing or wet/gurgly vocal quality is noted  Outcome: Progressing

## 2022-10-19 NOTE — PLAN OF CARE
Pt. Cleared for discharge, pt. Will be returning to the [place], report called in. Problem: Patient Centered Care  Goal: Patient preferences are identified and integrated in the patient's plan of care  Description: Interventions:  - What would you like us to know as we care for you? Family member Montrell Schuler is guardian  - Provide timely, complete, and accurate information to patient/family  - Incorporate patient and family knowledge, values, beliefs, and cultural backgrounds into the planning and delivery of care  - Encourage patient/family to participate in care and decision-making at the level they choose  - Honor patient and family perspectives and choices  Outcome: Completed     Problem: Patient/Family Goals  Goal: Patient/Family Long Term Goal  Description: Patient's Long Term Goal: Identify source of infection and treat    Interventions:  - Complete all tests  - take medication  - monitor vital signs  - See additional Care Plan goals for specific interventions  Outcome: Completed  Goal: Patient/Family Short Term Goal  Description: Patient's Short Term Goal: Unblock the G-tube    Interventions:   - Use medication to unblock g-tube  - See additional Care Plan goals for specific interventions  Outcome: Completed     Problem: CARDIOVASCULAR - ADULT  Goal: Maintains optimal cardiac output and hemodynamic stability  Description: INTERVENTIONS:  - Monitor vital signs, rhythm, and trends  - Monitor for bleeding, hypotension and signs of decreased cardiac output  - Evaluate effectiveness of vasoactive medications to optimize hemodynamic stability  - Monitor arterial and/or venous puncture sites for bleeding and/or hematoma  - Assess quality of pulses, skin color and temperature  - Assess for signs of decreased coronary artery perfusion - ex.  Angina  - Evaluate fluid balance, assess for edema, trend weights  Outcome: Completed  Goal: Absence of cardiac arrhythmias or at baseline  Description: INTERVENTIONS:  - Continuous cardiac monitoring, monitor vital signs, obtain 12 lead EKG if indicated  - Evaluate effectiveness of antiarrhythmic and heart rate control medications as ordered  - Initiate emergency measures for life threatening arrhythmias  - Monitor electrolytes and administer replacement therapy as ordered  Outcome: Completed     Problem: RESPIRATORY - ADULT  Goal: Achieves optimal ventilation and oxygenation  Description: INTERVENTIONS:  - Assess for changes in respiratory status  - Assess for changes in mentation and behavior  - Position to facilitate oxygenation and minimize respiratory effort  - Oxygen supplementation based on oxygen saturation or ABGs  - Provide Smoking Cessation handout, if applicable  - Encourage broncho-pulmonary hygiene including cough, deep breathe, Incentive Spirometry  - Assess the need for suctioning and perform as needed  - Assess and instruct to report SOB or any respiratory difficulty  - Respiratory Therapy support as indicated  - Manage/alleviate anxiety  - Monitor for signs/symptoms of CO2 retention  Outcome: Completed     Problem: NEUROLOGICAL - ADULT  Goal: Absence of seizures  Description: INTERVENTIONS  - Monitor for seizure activity  - Administer anti-seizure medications as ordered  - Monitor neurological status  Outcome: Completed  Goal: Remains free of injury related to seizure activity  Description: INTERVENTIONS:  - Maintain airway, patient safety  and administer oxygen as ordered  - Monitor patient for seizure activity, document and report duration and description of seizure to MD/LIP  - If seizure occurs, turn patient to side and suction secretions as needed  - Reorient patient post seizure  - Seizure pads on all 4 side rails  - Instruct patient/family to notify RN of any seizure activity  - Instruct patient/family to call for assistance with activity based on assessment  Outcome: Completed     Problem: SKIN/TISSUE INTEGRITY - ADULT  Goal: Skin integrity remains intact  Description: INTERVENTIONS  - Assess and document risk factors for pressure ulcer development  - Assess and document skin integrity  - Monitor for areas of redness and/or skin breakdown  - Initiate interventions, skin care algorithm/standards of care as needed  Outcome: Completed  Goal: Incision(s), wounds(s) or drain site(s) healing without S/S of infection  Description: INTERVENTIONS:  - Assess and document risk factors for pressure ulcer development  - Assess and document skin integrity  - Assess and document dressing/incision, wound bed, drain sites and surrounding tissue  - Implement wound care per orders  - Initiate isolation precautions as appropriate  - Initiate Pressure Ulcer prevention bundle as indicated  Outcome: Completed  Goal: Oral mucous membranes remain intact  Description: INTERVENTIONS  - Assess oral mucosa and hygiene practices  - Implement preventative oral hygiene regimen  - Implement oral medicated treatments as ordered  Outcome: Completed     Problem: MUSCULOSKELETAL - ADULT  Goal: Return mobility to safest level of function  Description: INTERVENTIONS:  - Assess patient stability and activity tolerance for standing, transferring and ambulating w/ or w/o assistive devices  - Assist with transfers and ambulation using safe patient handling equipment as needed  - Ensure adequate protection for wounds/incisions during mobilization  - Obtain PT/OT consults as needed  - Advance activity as appropriate  - Communicate ordered activity level and limitations with patient/family  Outcome: Completed     Problem: Impaired Swallowing  Goal: Minimize aspiration risk  Description: Interventions:  - Patient should be alert and upright for all feedings (90 degrees preferred)  - Offer food and liquids at a slow rate  - No straws  - Encourage small bites of food and small sips of liquid  - Offer pills one at a time, crush or deliver with applesauce as needed  - Discontinue feeding and notify MD (or speech pathologist) if coughing or persistent throat clearing or wet/gurgly vocal quality is noted  Outcome: Completed

## 2022-10-20 NOTE — PAYOR COMM NOTE
--------------  DISCHARGE REVIEW    Payor: Mario Rae [place] #:  [id number]  Authorization Number: [id number]    Admit date: 10/15/22  Admit time:   9:38 AM  Discharge Date: 10/19/2022  1:45 PM     Admitting Physician: Analia Guardado MD  Attending Physician:  No att. providers found  Primary Care Physician: Analia Guardado MD       Discharge Summary signed by Analia Guardado MD at 10/19/2022  7:36 AM     Author: Analia Guardado MD Specialty: Internal Medicine Author Type: Physician    Filed: 10/19/2022  7:36 AM Date of Service: 10/18/2022  5:01 PM Status: Addendum    : Analia Guardado MD (Physician)    Related Notes: Original Note by Analia Guardado MD (Physician) filed at 10/18/2022  5:04 PM     [facility]  Discharge Summary    Herbert Ramon Patient Status:  Inpatient    1950 MRN [id number]   Location [facility] 3W/SW Attending Analia Guardado MD   Hosp Day # 3 PCP Marisol Lynch MD, Eloy Huynh MD     Date of Admission: 10/15/2022   Date of Discharge: 10/19/2022    Admitting Diagnosis: Dehydration [E86.0]  Febrile illness, acute [R50.9]  Lactic acidemia [E87.20]    Disposition: Transfer to [facility]    Discharge Diagnosis: . Principal Problem:    Febrile illness, acute  Active Problems:    Dehydration    Lactic acidemia    Hospital Course:   Reason for Admission: Fever    Discharge Physical Exam: General appearance:  alert, appears stated age and cooperative  Pulmonary: clear to auscultation bilaterally  Cardiovascular: S1, S2 normal, no murmur, click, rub or gallop, regular rate and rhythm, S1, S2 normal  Abdominal: soft, non-tender; bowel sounds normal; no masses,  no organomegaly  Extremities: extremities normal, atraumatic, no cyanosis or edema  Pulses: 2+ and symmetric    Hospital Course: Pt treated with IV abx, doing better DC to SNIF on PO vanco x 2 weeks    Complications: None    Consultants  Chat With All Active Members    Provider Role Specialty    Lilian Montero MD  Consulting Physician  INFECTIOUS DISEASES    Monico Boxer, DO  Consulting Physician  INFECTIOUS DISEASES    Daylin Corbett MD Consulting Physician  Internal Medicine          Pending Labs     Order Current Status    Blood Culture Preliminary result    Blood Culture Preliminary result        Discharge Plan:   Discharge Condition: Stable    Current Discharge Medication List    New Orders    vancomycin 50 MG/ML Oral Recon Soln  2.5 mL (125 mg total) by Per G Tube route every 6 (six) hours. Home Meds - Unchanged    collagenase (SANTYL) 250 UNIT/GM External Ointment  Apply 1 Application topically 2 (two) times a day. To sacral wound    atorvastatin 40 MG Oral Tab  Take 40 mg by mouth nightly. omeprazole 2 mg/mL Oral Suspension  Take 10 mL (20 mg total) by mouth daily. baclofen 10 MG Oral Tab  Take 10 mg by mouth 3 (three) times daily as needed. bisacodyl 10 MG Rectal Suppos  Place 10 mg rectally every third day as needed (constipation). metoprolol tartrate 25 MG Oral Tab  Take 0 mg by mouth 2 (two) times daily. Hold SBP < 100 or heart rate < 60    Pantoprazole Sodium 40 MG Oral Powd Pack  Take 40 mg by mouth daily. sennosides 8.8 MG/5ML Oral Syrup  Take 5 mL by mouth nightly. PEG 3350 17 g Oral Powd Pack  Take 17 g by mouth daily. Calcium 500-125 MG-UNIT Oral Tab  Take by mouth 2 (two) times a day. docusate sodium 50 MG/5ML Oral Liquid  Take 100 mg by mouth 2 (two) times daily. magnesium oxide 400 MG Oral Tab  Take 400 mg by mouth daily. DERMACERIN External Cream  Apply topically as needed. ergocalciferol 33934 units Oral Cap  Take 5,000 Units by mouth once a week. ferrous sulfate 325 (65 FE) MG Oral Tab EC  Take 325 mg by mouth daily. Multiple Vitamin (MULTI-DAY VITAMINS) Oral Tab  Take by mouth daily. phenytoin 125 MG/5ML Oral Suspension  Take 200 mg by mouth 2 (two) times daily.     acetaminophen 325 MG Oral Tab  Take 650 mg by mouth 2 (two) times daily as needed for Pain. Atropine Sulfate 1 % Ophthalmic Solution  1-2 drops SUBLINGUAL- every hour when necessary for excessive secretions              Discharge Diet: As tolerated    Discharge Activity: As tolerated    Follow up:        Follow up Labs: 1 week        Rei Pereira MD, Maury Butt MD  0/19/2022  7:36 AM      REVIEWER COMMENTS    FOR FINAL REVIEW/APPROVAL OF ALL INPT DAYS

## 2022-11-02 NOTE — CDS QUERY
How to Answer this Query    1.) Click \"Edit Button\" on the toolbar  2.) Type an \"X\" in the bracket for the diagnosis that applies. (You may also add additional clinical details as you feel necessary to substantiate your response). 3.) Finally click \"Sign\" to complete response. Thank you     CLINICAL DOCUMENTATION CLARIFICATION FORM  Anemia Specificity     By submitting this query, we are merely seeking further clarification of documentation to accurately reflect all conditions that you are monitoring, evaluating, treating or that extend the hospitalization or utilize additional resources of care. Please utilize your independent clinical judgment when addressing the question(s) below. Dear Doctor Durga Carrasco:  Please clarify the type of anemia:  ( x)  Blood loss anemia, acute  ( )  Blood loss anemia, chronic  ( )  Chronic anemia  ( )   Deficiency anemia (please specify type)  ( )  Iron deficiency anemia  ( )  Microcytic anemia  ( )  Normocytic anemia  ( )  Other (specify)___________________  ( )  Clinically unable to determine  ( )  Unknown  H&P:  Anemia H/H    10-16 Dr Durga Carrasco PN:  Anemia transfuse 1 unit of PRBC  10-17, 10-18  Dr Durga Carrasco PN: Anemia H/H  Labs:  10-15 Hg 8.0   10-16 Hg 6.6,  Hg 8.4  10-17 Hg 8.6  Meds/Treatment includes transfused 1 PRBC and meds ferrous sulfate 300mg syrup  If you have any questions, please contact Clinical :  Eugene Francis RN CCDS at [phone]     Thank You!     THIS FORM IS A PERMANENT PART OF THE MEDICAL RECORD

## 2022-11-02 NOTE — CDS QUERY
How to Answer this Query    1.) Click \"Edit Button\" on the toolbar  2.) Type an \"X\" in the bracket for the diagnosis that applies. (You may also add additional clinical details as you feel necessary to substantiate your response). 3.) Finally click \"Sign\" to complete response. Thank you       Impaired Skin Integrity  CLINICAL DOCUMENTATION CLARIFICATION FORM  Dear Doctor Perico Tapia:  Clinical information (provided below) includes documentation indicating impaired skin integrity. For accurate ICD-10-CM code assignment ,  PLEASE CHECK ALL DIAGNOSES THAT APPLY    Type of Ulcer-    Please check the type of ulcer &  POA  (present on admission)             [  x] Pressure ulcer    POA Yes [ x]  No [  ]      [  ] Other (please specify): __________________________________________________________  [  ] Unable to Determine (please comment) _____________________________________________      Clinical Indicators:    H&P Past Medical History: Pressure Ulcer  Assessment/Plan:     Febrile illness, acute  Start abx, ID evaluation    Dehydration  On fluids  Anemia H/H    Lactic acidemia  Will monitor  S/P PEG on tube feedings   HLD on statin  H/O Traumatic Brain injury    ID consult:  PMH: Pressure Ulcer   Assessment:  Sacral Wound    10-16, 10-17, 10-18, 10-19  ID PN:   Sacral wound    Nursing flow sheet Wound:  10-16 Pressure Injury Sacrum Stage 4    Treatment:  4x4; Mepilex dressing        For questions regarding this query, please contact Clinical : Mary Hare  [address] [phone]    Thank you.   THIS FORM IS A PERMANENT PART OF THE MEDICAL RECORD

## 2022-11-02 NOTE — CDS QUERY
How to Answer this Query    1.) Click \"Edit Button\" on the toolbar  2.) Type an \"X\" in the bracket for the diagnosis that applies. (You may also add additional clinical details as you feel necessary to substantiate your response). 3.) Finally click \"Sign\" to complete response. Thank you     [place]  Dear Dr. Emelyn Mckeon     Please provide additional documentation in the patient's medical record supportive of  documented diagnosis of _ ? Aspiration pneumonia_______x____        (  )     Condition was evident because: ___________________________                  (  )     Condition was ruled out         (  )     Clinically unable to provide additional clarity regarding the diagnosis        (  )     Other (please specify)___________________________        (  )     Unknown     __________________________________________________________________   Risk Factors:  Dysphagia  S/P PEG on tube feedings  TBI  Seizure disorder  Clinical Indicators:  ID consult & 10-16 to 10-19 PN:  # Febrile illness- ? Aspiration pneumonia, r/o viral URI, bacteremia; no clear e/o uti, ssti      10-15 Plan : Continue empiric vancomycin and meropenem  -  FU cx  -  Check RPP. Droplet iso.  -  Monitor respiratory status/O2 recs    10-19 Dr Emelyn Mckeon PN:  Febrile illness, acute resolved  DC to [facility] on po vanco x 2 weeks      10-15 CXR: CONCLUSION: Pulmonary vascular congestion. Cardiomegaly. Prominent perihilar pulmonary markings. Findings suggest CHF however correlate for superimposed pneumonia. 10-17 CXR: CONCLUSION:   1. Unfavorable change from October 15, 2022 with left lower lobe consolidation/atelectasis and probable small effusion now noted. 2. Borderline cardiomegaly. 3. Persistent infiltrate right mid lung field. 4. Prominent markings suggesting mild CHF.    Meds include meropenem and vancomycin IVPB 10-15 to 10-18      If you have any questions, please contact Clinical : Aleah Sauer RN CCDS  at  [phone]   Thank You!      THIS FORM IS A PERMANENT PART OF THE MEDICAL RECORD

## 2023-01-01 ENCOUNTER — APPOINTMENT (OUTPATIENT)
Dept: GENERAL RADIOLOGY | Facility: HOSPITAL | Age: 73
End: 2023-01-01
Attending: INTERNAL MEDICINE
Payer: MEDICARE

## 2023-01-01 ENCOUNTER — APPOINTMENT (OUTPATIENT)
Dept: GENERAL RADIOLOGY | Facility: HOSPITAL | Age: 73
End: 2023-01-01
Attending: EMERGENCY MEDICINE
Payer: MEDICARE

## 2023-01-01 ENCOUNTER — HOSPITAL ENCOUNTER (INPATIENT)
Facility: HOSPITAL | Age: 73
LOS: 1 days | Discharge: HOSPICE | End: 2023-01-01
Attending: EMERGENCY MEDICINE | Admitting: EMERGENCY MEDICINE
Payer: MEDICARE

## 2023-01-01 ENCOUNTER — HOSPITAL ENCOUNTER (INPATIENT)
Facility: HOSPITAL | Age: 73
LOS: 3 days | Discharge: NURSING, REHAB OR LONG-TERM CARE | End: 2023-01-01
Attending: STUDENT IN AN ORGANIZED HEALTH CARE EDUCATION/TRAINING PROGRAM | Admitting: INTERNAL MEDICINE
Payer: MEDICARE

## 2023-01-01 ENCOUNTER — HOSPITAL ENCOUNTER (INPATIENT)
Facility: HOSPITAL | Age: 73
LOS: 5 days | End: 2023-01-01
Attending: INTERNAL MEDICINE | Admitting: INTERNAL MEDICINE
Payer: OTHER MISCELLANEOUS

## 2023-01-01 ENCOUNTER — APPOINTMENT (OUTPATIENT)
Dept: GENERAL RADIOLOGY | Facility: HOSPITAL | Age: 73
End: 2023-01-01
Payer: MEDICARE

## 2023-01-01 VITALS
TEMPERATURE: 98 F | BODY MASS INDEX: 24 KG/M2 | DIASTOLIC BLOOD PRESSURE: 63 MMHG | OXYGEN SATURATION: 100 % | SYSTOLIC BLOOD PRESSURE: 123 MMHG | RESPIRATION RATE: 20 BRPM | WEIGHT: 157.19 LBS | HEART RATE: 109 BPM

## 2023-01-01 VITALS
DIASTOLIC BLOOD PRESSURE: 60 MMHG | TEMPERATURE: 98 F | OXYGEN SATURATION: 99 % | BODY MASS INDEX: 24 KG/M2 | SYSTOLIC BLOOD PRESSURE: 115 MMHG | HEART RATE: 129 BPM | RESPIRATION RATE: 26 BRPM | WEIGHT: 158.5 LBS

## 2023-01-01 VITALS
OXYGEN SATURATION: 96 % | RESPIRATION RATE: 16 BRPM | SYSTOLIC BLOOD PRESSURE: 109 MMHG | HEART RATE: 129 BPM | DIASTOLIC BLOOD PRESSURE: 56 MMHG | TEMPERATURE: 98 F

## 2023-01-01 DIAGNOSIS — J69.0 ASPIRATION PNEUMONIA OF RIGHT UPPER LOBE, UNSPECIFIED ASPIRATION PNEUMONIA TYPE (HCC): ICD-10-CM

## 2023-01-01 DIAGNOSIS — J96.21 ACUTE ON CHRONIC RESPIRATORY FAILURE WITH HYPOXIA (HCC): ICD-10-CM

## 2023-01-01 DIAGNOSIS — J96.11 CHRONIC RESPIRATORY FAILURE WITH HYPOXIA (HCC): ICD-10-CM

## 2023-01-01 DIAGNOSIS — J69.0 ASPIRATION PNEUMONITIS (HCC): Primary | ICD-10-CM

## 2023-01-01 DIAGNOSIS — R11.2 NAUSEA AND VOMITING, UNSPECIFIED VOMITING TYPE: ICD-10-CM

## 2023-01-01 DIAGNOSIS — K92.2 UGIB (UPPER GASTROINTESTINAL BLEED): Primary | ICD-10-CM

## 2023-01-01 DIAGNOSIS — D64.9 ANEMIA, UNSPECIFIED TYPE: ICD-10-CM

## 2023-01-01 LAB
ALBUMIN SERPL-MCNC: 1.4 G/DL (ref 3.4–5)
ALBUMIN SERPL-MCNC: 1.5 G/DL (ref 3.4–5)
ALP LIVER SERPL-CCNC: 123 U/L
ALP LIVER SERPL-CCNC: 165 U/L
ALT SERPL-CCNC: 12 U/L
ALT SERPL-CCNC: 24 U/L
ANION GAP SERPL CALC-SCNC: 6 MMOL/L (ref 0–18)
ANION GAP SERPL CALC-SCNC: 6 MMOL/L (ref 0–18)
ANION GAP SERPL CALC-SCNC: 7 MMOL/L (ref 0–18)
ANION GAP SERPL CALC-SCNC: 8 MMOL/L (ref 0–18)
ANTIBODY SCREEN: NEGATIVE
APTT PPP: 36.5 SECONDS (ref 23.3–35.6)
AST SERPL-CCNC: 19 U/L (ref 15–37)
AST SERPL-CCNC: 48 U/L (ref 15–37)
BASOPHILS # BLD AUTO: 0.01 X10(3) UL (ref 0–0.2)
BASOPHILS # BLD AUTO: 0.02 X10(3) UL (ref 0–0.2)
BASOPHILS # BLD AUTO: 0.04 X10(3) UL (ref 0–0.2)
BASOPHILS NFR BLD AUTO: 0.1 %
BASOPHILS NFR BLD AUTO: 0.1 %
BASOPHILS NFR BLD AUTO: 0.2 %
BASOPHILS NFR BLD AUTO: 0.2 %
BASOPHILS NFR BLD AUTO: 0.3 %
BILIRUB DIRECT SERPL-MCNC: 0.2 MG/DL (ref 0–0.2)
BILIRUB DIRECT SERPL-MCNC: 0.2 MG/DL (ref 0–0.2)
BILIRUB SERPL-MCNC: 0.3 MG/DL (ref 0.1–2)
BILIRUB SERPL-MCNC: 0.4 MG/DL (ref 0.1–2)
BILIRUB UR QL: NEGATIVE
BLACTX-M ISLT/SPM QL: NOT DETECTED
BLOOD TYPE BARCODE: 7300
BLOOD TYPE BARCODE: 7300
BUN BLD-MCNC: 19 MG/DL (ref 7–18)
BUN BLD-MCNC: 25 MG/DL (ref 7–18)
BUN BLD-MCNC: 7 MG/DL (ref 7–18)
BUN BLD-MCNC: 7 MG/DL (ref 7–18)
BUN/CREAT SERPL: 21.9 (ref 10–20)
BUN/CREAT SERPL: 23.3 (ref 10–20)
BUN/CREAT SERPL: 35.7 (ref 10–20)
BUN/CREAT SERPL: 52.8 (ref 10–20)
CALCIUM BLD-MCNC: 7.6 MG/DL (ref 8.5–10.1)
CALCIUM BLD-MCNC: 7.9 MG/DL (ref 8.5–10.1)
CALCIUM BLD-MCNC: 7.9 MG/DL (ref 8.5–10.1)
CALCIUM BLD-MCNC: 8.2 MG/DL (ref 8.5–10.1)
CHLORIDE SERPL-SCNC: 106 MMOL/L (ref 98–112)
CHLORIDE SERPL-SCNC: 108 MMOL/L (ref 98–112)
CHLORIDE SERPL-SCNC: 109 MMOL/L (ref 98–112)
CHLORIDE SERPL-SCNC: 111 MMOL/L (ref 98–112)
CO2 SERPL-SCNC: 26 MMOL/L (ref 21–32)
CO2 SERPL-SCNC: 27 MMOL/L (ref 21–32)
COLOR UR: YELLOW
CREAT BLD-MCNC: 0.3 MG/DL
CREAT BLD-MCNC: 0.32 MG/DL
CREAT BLD-MCNC: 0.36 MG/DL
CREAT BLD-MCNC: 0.7 MG/DL
DEPRECATED RDW RBC AUTO: 50.4 FL (ref 35.1–46.3)
DEPRECATED RDW RBC AUTO: 51.3 FL (ref 35.1–46.3)
DEPRECATED RDW RBC AUTO: 53 FL (ref 35.1–46.3)
DEPRECATED RDW RBC AUTO: 55.3 FL (ref 35.1–46.3)
DEPRECATED RDW RBC AUTO: 58.8 FL (ref 35.1–46.3)
E COLI DNA BLD POS QL NAA+NON-PROBE: DETECTED
EOSINOPHIL # BLD AUTO: 0.01 X10(3) UL (ref 0–0.7)
EOSINOPHIL # BLD AUTO: 0.12 X10(3) UL (ref 0–0.7)
EOSINOPHIL # BLD AUTO: 0.13 X10(3) UL (ref 0–0.7)
EOSINOPHIL # BLD AUTO: 0.15 X10(3) UL (ref 0–0.7)
EOSINOPHIL # BLD AUTO: 0.19 X10(3) UL (ref 0–0.7)
EOSINOPHIL NFR BLD AUTO: 0.1 %
EOSINOPHIL NFR BLD AUTO: 0.8 %
EOSINOPHIL NFR BLD AUTO: 1 %
EOSINOPHIL NFR BLD AUTO: 1.4 %
EOSINOPHIL NFR BLD AUTO: 1.4 %
ERYTHROCYTE [DISTWIDTH] IN BLOOD BY AUTOMATED COUNT: 16.7 % (ref 11–15)
ERYTHROCYTE [DISTWIDTH] IN BLOOD BY AUTOMATED COUNT: 16.8 % (ref 11–15)
ERYTHROCYTE [DISTWIDTH] IN BLOOD BY AUTOMATED COUNT: 17.1 % (ref 11–15)
ERYTHROCYTE [DISTWIDTH] IN BLOOD BY AUTOMATED COUNT: 17.2 % (ref 11–15)
ERYTHROCYTE [DISTWIDTH] IN BLOOD BY AUTOMATED COUNT: 18.1 % (ref 11–15)
GFR SERPLBLD BASED ON 1.73 SQ M-ARVRAT: 108 ML/MIN/1.73M2 (ref 60–?)
GFR SERPLBLD BASED ON 1.73 SQ M-ARVRAT: 112 ML/MIN/1.73M2 (ref 60–?)
GFR SERPLBLD BASED ON 1.73 SQ M-ARVRAT: 113 ML/MIN/1.73M2 (ref 60–?)
GFR SERPLBLD BASED ON 1.73 SQ M-ARVRAT: 92 ML/MIN/1.73M2 (ref 60–?)
GLUCOSE BLD-MCNC: 114 MG/DL (ref 70–99)
GLUCOSE BLD-MCNC: 121 MG/DL (ref 70–99)
GLUCOSE BLD-MCNC: 132 MG/DL (ref 70–99)
GLUCOSE BLD-MCNC: 176 MG/DL (ref 70–99)
GLUCOSE BLDC GLUCOMTR-MCNC: 102 MG/DL (ref 70–99)
GLUCOSE BLDC GLUCOMTR-MCNC: 114 MG/DL (ref 70–99)
GLUCOSE BLDC GLUCOMTR-MCNC: 120 MG/DL (ref 70–99)
GLUCOSE BLDC GLUCOMTR-MCNC: 122 MG/DL (ref 70–99)
GLUCOSE BLDC GLUCOMTR-MCNC: 124 MG/DL (ref 70–99)
GLUCOSE BLDC GLUCOMTR-MCNC: 144 MG/DL (ref 70–99)
GLUCOSE BLDC GLUCOMTR-MCNC: 149 MG/DL (ref 70–99)
GLUCOSE BLDC GLUCOMTR-MCNC: 169 MG/DL (ref 70–99)
GLUCOSE BLDC GLUCOMTR-MCNC: 77 MG/DL (ref 70–99)
GLUCOSE BLDC GLUCOMTR-MCNC: 80 MG/DL (ref 70–99)
GLUCOSE BLDC GLUCOMTR-MCNC: 83 MG/DL (ref 70–99)
GLUCOSE BLDC GLUCOMTR-MCNC: 95 MG/DL (ref 70–99)
GLUCOSE BLDC GLUCOMTR-MCNC: 97 MG/DL (ref 70–99)
GLUCOSE BLDC GLUCOMTR-MCNC: 98 MG/DL (ref 70–99)
GLUCOSE UR-MCNC: NORMAL MG/DL
HCT VFR BLD AUTO: 22.7 %
HCT VFR BLD AUTO: 24.1 %
HCT VFR BLD AUTO: 26.5 %
HCT VFR BLD AUTO: 26.8 %
HCT VFR BLD AUTO: 27.4 %
HGB BLD-MCNC: 6.8 G/DL
HGB BLD-MCNC: 7 G/DL
HGB BLD-MCNC: 7.7 G/DL
HGB BLD-MCNC: 8 G/DL
HGB BLD-MCNC: 8.2 G/DL
HGB BLD-MCNC: 8.5 G/DL
HGB BLD-MCNC: 8.6 G/DL
IMM GRANULOCYTES # BLD AUTO: 0.1 X10(3) UL (ref 0–1)
IMM GRANULOCYTES # BLD AUTO: 0.1 X10(3) UL (ref 0–1)
IMM GRANULOCYTES # BLD AUTO: 0.11 X10(3) UL (ref 0–1)
IMM GRANULOCYTES # BLD AUTO: 0.12 X10(3) UL (ref 0–1)
IMM GRANULOCYTES # BLD AUTO: 0.14 X10(3) UL (ref 0–1)
IMM GRANULOCYTES NFR BLD: 0.7 %
IMM GRANULOCYTES NFR BLD: 0.8 %
IMM GRANULOCYTES NFR BLD: 0.9 %
IMM GRANULOCYTES NFR BLD: 0.9 %
IMM GRANULOCYTES NFR BLD: 1.1 %
INR BLD: 1.23 (ref 0.85–1.16)
INR BLD: 1.38 (ref 0.85–1.16)
KETONES UR-MCNC: NEGATIVE MG/DL
LACTATE SERPL-SCNC: 2.3 MMOL/L (ref 0.4–2)
LACTATE SERPL-SCNC: 2.8 MMOL/L (ref 0.4–2)
LACTATE SERPL-SCNC: 4.2 MMOL/L (ref 0.4–2)
LEUKOCYTE ESTERASE UR QL STRIP.AUTO: 250
LYMPHOCYTES # BLD AUTO: 0.81 X10(3) UL (ref 1–4)
LYMPHOCYTES # BLD AUTO: 0.84 X10(3) UL (ref 1–4)
LYMPHOCYTES # BLD AUTO: 0.89 X10(3) UL (ref 1–4)
LYMPHOCYTES # BLD AUTO: 1.03 X10(3) UL (ref 1–4)
LYMPHOCYTES # BLD AUTO: 1.27 X10(3) UL (ref 1–4)
LYMPHOCYTES NFR BLD AUTO: 6.2 %
LYMPHOCYTES NFR BLD AUTO: 6.7 %
LYMPHOCYTES NFR BLD AUTO: 7.5 %
LYMPHOCYTES NFR BLD AUTO: 8.2 %
LYMPHOCYTES NFR BLD AUTO: 8.4 %
MCH RBC QN AUTO: 24.9 PG (ref 26–34)
MCH RBC QN AUTO: 26.3 PG (ref 26–34)
MCH RBC QN AUTO: 26.5 PG (ref 26–34)
MCH RBC QN AUTO: 26.5 PG (ref 26–34)
MCH RBC QN AUTO: 26.7 PG (ref 26–34)
MCHC RBC AUTO-ENTMCNC: 28.2 G/DL (ref 31–37)
MCHC RBC AUTO-ENTMCNC: 30.2 G/DL (ref 31–37)
MCHC RBC AUTO-ENTMCNC: 30.8 G/DL (ref 31–37)
MCHC RBC AUTO-ENTMCNC: 31.4 G/DL (ref 31–37)
MCHC RBC AUTO-ENTMCNC: 31.7 G/DL (ref 31–37)
MCV RBC AUTO: 83.5 FL
MCV RBC AUTO: 84.3 FL
MCV RBC AUTO: 85.3 FL
MCV RBC AUTO: 88.3 FL
MCV RBC AUTO: 88.3 FL
MONOCYTES # BLD AUTO: 0.8 X10(3) UL (ref 0.1–1)
MONOCYTES # BLD AUTO: 0.8 X10(3) UL (ref 0.1–1)
MONOCYTES # BLD AUTO: 0.85 X10(3) UL (ref 0.1–1)
MONOCYTES # BLD AUTO: 0.93 X10(3) UL (ref 0.1–1)
MONOCYTES # BLD AUTO: 0.99 X10(3) UL (ref 0.1–1)
MONOCYTES NFR BLD AUTO: 6 %
MONOCYTES NFR BLD AUTO: 6.3 %
MONOCYTES NFR BLD AUTO: 6.5 %
MONOCYTES NFR BLD AUTO: 7.4 %
MONOCYTES NFR BLD AUTO: 7.4 %
MRSA DNA SPEC QL NAA+PROBE: NEGATIVE
MRSA DNA SPEC QL NAA+PROBE: NEGATIVE
NEUTROPHILS # BLD AUTO: 10.48 X10 (3) UL (ref 1.5–7.7)
NEUTROPHILS # BLD AUTO: 10.48 X10(3) UL (ref 1.5–7.7)
NEUTROPHILS # BLD AUTO: 11.24 X10 (3) UL (ref 1.5–7.7)
NEUTROPHILS # BLD AUTO: 11.24 X10(3) UL (ref 1.5–7.7)
NEUTROPHILS # BLD AUTO: 11.59 X10 (3) UL (ref 1.5–7.7)
NEUTROPHILS # BLD AUTO: 11.59 X10(3) UL (ref 1.5–7.7)
NEUTROPHILS # BLD AUTO: 12.67 X10 (3) UL (ref 1.5–7.7)
NEUTROPHILS # BLD AUTO: 12.67 X10(3) UL (ref 1.5–7.7)
NEUTROPHILS # BLD AUTO: 8.92 X10 (3) UL (ref 1.5–7.7)
NEUTROPHILS # BLD AUTO: 8.92 X10(3) UL (ref 1.5–7.7)
NEUTROPHILS NFR BLD AUTO: 82.6 %
NEUTROPHILS NFR BLD AUTO: 83.1 %
NEUTROPHILS NFR BLD AUTO: 83.5 %
NEUTROPHILS NFR BLD AUTO: 84.8 %
NEUTROPHILS NFR BLD AUTO: 85.4 %
NITRITE UR QL STRIP.AUTO: NEGATIVE
OSMOLALITY SERPL CALC.SUM OF ELEC: 294 MOSM/KG (ref 275–295)
OSMOLALITY SERPL CALC.SUM OF ELEC: 297 MOSM/KG (ref 275–295)
OSMOLALITY SERPL CALC.SUM OF ELEC: 297 MOSM/KG (ref 275–295)
OSMOLALITY SERPL CALC.SUM OF ELEC: 299 MOSM/KG (ref 275–295)
PH UR: 7.5 [PH] (ref 5–8)
PLATELET # BLD AUTO: 371 10(3)UL (ref 150–450)
PLATELET # BLD AUTO: 372 10(3)UL (ref 150–450)
PLATELET # BLD AUTO: 415 10(3)UL (ref 150–450)
PLATELET # BLD AUTO: 464 10(3)UL (ref 150–450)
PLATELET # BLD AUTO: 616 10(3)UL (ref 150–450)
POTASSIUM SERPL-SCNC: 3 MMOL/L (ref 3.5–5.1)
POTASSIUM SERPL-SCNC: 3.9 MMOL/L (ref 3.5–5.1)
POTASSIUM SERPL-SCNC: 3.9 MMOL/L (ref 3.5–5.1)
POTASSIUM SERPL-SCNC: 4.3 MMOL/L (ref 3.5–5.1)
PROT SERPL-MCNC: 5.9 G/DL (ref 6.4–8.2)
PROT SERPL-MCNC: 6.1 G/DL (ref 6.4–8.2)
PROT UR-MCNC: 300 MG/DL
PROTHROMBIN TIME: 15.4 SECONDS (ref 11.6–14.8)
PROTHROMBIN TIME: 16.7 SECONDS (ref 11.6–14.8)
RBC # BLD AUTO: 2.66 X10(6)UL
RBC # BLD AUTO: 2.73 X10(6)UL
RBC # BLD AUTO: 3 X10(6)UL
RBC # BLD AUTO: 3.21 X10(6)UL
RBC # BLD AUTO: 3.25 X10(6)UL
RBC #/AREA URNS AUTO: >10 /HPF
RH BLOOD TYPE: POSITIVE
SODIUM SERPL-SCNC: 140 MMOL/L (ref 136–145)
SODIUM SERPL-SCNC: 142 MMOL/L (ref 136–145)
SODIUM SERPL-SCNC: 142 MMOL/L (ref 136–145)
SODIUM SERPL-SCNC: 144 MMOL/L (ref 136–145)
SP GR UR STRIP: >1.03 (ref 1–1.03)
UNIT VOLUME: 350 ML
UNIT VOLUME: 350 ML
UROBILINOGEN UR STRIP-ACNC: 3
WBC # BLD AUTO: 10.8 X10(3) UL (ref 4–11)
WBC # BLD AUTO: 12.6 X10(3) UL (ref 4–11)
WBC # BLD AUTO: 13.3 X10(3) UL (ref 4–11)
WBC # BLD AUTO: 13.6 X10(3) UL (ref 4–11)
WBC # BLD AUTO: 15.2 X10(3) UL (ref 4–11)
WBC #/AREA URNS AUTO: >50 /HPF
YEAST UR QL: PRESENT /HPF

## 2023-01-01 PROCEDURE — 99221 1ST HOSP IP/OBS SF/LOW 40: CPT | Performed by: REGISTERED NURSE

## 2023-01-01 PROCEDURE — 71045 X-RAY EXAM CHEST 1 VIEW: CPT | Performed by: INTERNAL MEDICINE

## 2023-01-01 PROCEDURE — 5A09457 ASSISTANCE WITH RESPIRATORY VENTILATION, 24-96 CONSECUTIVE HOURS, CONTINUOUS POSITIVE AIRWAY PRESSURE: ICD-10-PCS | Performed by: INTERNAL MEDICINE

## 2023-01-01 PROCEDURE — 30233N1 TRANSFUSION OF NONAUTOLOGOUS RED BLOOD CELLS INTO PERIPHERAL VEIN, PERCUTANEOUS APPROACH: ICD-10-PCS | Performed by: STUDENT IN AN ORGANIZED HEALTH CARE EDUCATION/TRAINING PROGRAM

## 2023-01-01 PROCEDURE — 99233 SBSQ HOSP IP/OBS HIGH 50: CPT | Performed by: INTERNAL MEDICINE

## 2023-01-01 PROCEDURE — 99232 SBSQ HOSP IP/OBS MODERATE 35: CPT | Performed by: REGISTERED NURSE

## 2023-01-01 PROCEDURE — 99231 SBSQ HOSP IP/OBS SF/LOW 25: CPT | Performed by: REGISTERED NURSE

## 2023-01-01 PROCEDURE — 99223 1ST HOSP IP/OBS HIGH 75: CPT | Performed by: INTERNAL MEDICINE

## 2023-01-01 PROCEDURE — 71045 X-RAY EXAM CHEST 1 VIEW: CPT | Performed by: STUDENT IN AN ORGANIZED HEALTH CARE EDUCATION/TRAINING PROGRAM

## 2023-01-01 PROCEDURE — 99222 1ST HOSP IP/OBS MODERATE 55: CPT | Performed by: REGISTERED NURSE

## 2023-01-01 PROCEDURE — 71045 X-RAY EXAM CHEST 1 VIEW: CPT | Performed by: EMERGENCY MEDICINE

## 2023-01-01 PROCEDURE — 99291 CRITICAL CARE FIRST HOUR: CPT | Performed by: INTERNAL MEDICINE

## 2023-01-01 RX ORDER — SODIUM CHLORIDE 9 MG/ML
INJECTION, SOLUTION INTRAVENOUS ONCE
Status: COMPLETED | OUTPATIENT
Start: 2023-01-01 | End: 2023-01-01

## 2023-01-01 RX ORDER — SENNOSIDES 8.8 MG/5ML
5 LIQUID ORAL EVERY EVENING
Status: DISCONTINUED | OUTPATIENT
Start: 2023-01-01 | End: 2023-01-01

## 2023-01-01 RX ORDER — LORAZEPAM 2 MG/ML
1 INJECTION INTRAMUSCULAR EVERY 4 HOURS PRN
Status: DISCONTINUED | OUTPATIENT
Start: 2023-01-01 | End: 2023-07-20

## 2023-01-01 RX ORDER — IPRATROPIUM BROMIDE AND ALBUTEROL SULFATE 2.5; .5 MG/3ML; MG/3ML
3 SOLUTION RESPIRATORY (INHALATION) EVERY 6 HOURS PRN
Status: ON HOLD | COMMUNITY
End: 2023-01-01

## 2023-01-01 RX ORDER — ZINC SULFATE 50(220)MG
220 CAPSULE ORAL DAILY
Status: ON HOLD | COMMUNITY
End: 2023-01-01

## 2023-01-01 RX ORDER — VANCOMYCIN HYDROCHLORIDE 50 MG/ML
125 KIT ORAL DAILY
Status: DISCONTINUED | OUTPATIENT
Start: 2023-01-01 | End: 2023-01-01

## 2023-01-01 RX ORDER — METOCLOPRAMIDE 5 MG/1
5 TABLET ORAL
Status: DISCONTINUED | OUTPATIENT
Start: 2023-01-01 | End: 2023-01-01

## 2023-01-01 RX ORDER — METRONIDAZOLE 500 MG/100ML
500 INJECTION, SOLUTION INTRAVENOUS EVERY 8 HOURS
Status: DISCONTINUED | OUTPATIENT
Start: 2023-01-01 | End: 2023-01-01

## 2023-01-01 RX ORDER — FUROSEMIDE 10 MG/ML
40 INJECTION INTRAMUSCULAR; INTRAVENOUS EVERY 8 HOURS PRN
Status: DISCONTINUED | OUTPATIENT
Start: 2023-01-01 | End: 2023-07-20

## 2023-01-01 RX ORDER — ERGOCALCIFEROL (VITAMIN D2) 200 MCG/ML
50000 DROPS ORAL WEEKLY
Status: DISCONTINUED | OUTPATIENT
Start: 2023-01-01 | End: 2023-01-01

## 2023-01-01 RX ORDER — ACETAMINOPHEN 325 MG/1
650 TABLET ORAL 2 TIMES DAILY PRN
Status: DISCONTINUED | OUTPATIENT
Start: 2023-01-01 | End: 2023-01-01

## 2023-01-01 RX ORDER — ERGOCALCIFEROL (VITAMIN D2) 200 MCG/ML
50000 DROPS ORAL WEEKLY
Status: ON HOLD | COMMUNITY
End: 2023-01-01

## 2023-01-01 RX ORDER — POTASSIUM CHLORIDE 14.9 MG/ML
20 INJECTION INTRAVENOUS ONCE
Status: COMPLETED | OUTPATIENT
Start: 2023-01-01 | End: 2023-01-01

## 2023-01-01 RX ORDER — GLYCOPYRROLATE 0.2 MG/ML
0.4 INJECTION, SOLUTION INTRAMUSCULAR; INTRAVENOUS
Status: DISCONTINUED | OUTPATIENT
Start: 2023-01-01 | End: 2023-07-20

## 2023-01-01 RX ORDER — POLYETHYLENE GLYCOL 3350 17 G/17G
17 POWDER, FOR SOLUTION ORAL DAILY PRN
Status: DISCONTINUED | OUTPATIENT
Start: 2023-01-01 | End: 2023-01-01

## 2023-01-01 RX ORDER — LEVOFLOXACIN 5 MG/ML
750 INJECTION, SOLUTION INTRAVENOUS EVERY 24 HOURS
Status: DISCONTINUED | OUTPATIENT
Start: 2023-01-01 | End: 2023-01-01

## 2023-01-01 RX ORDER — MORPHINE SULFATE 2 MG/ML
1 INJECTION, SOLUTION INTRAMUSCULAR; INTRAVENOUS
Status: DISCONTINUED | OUTPATIENT
Start: 2023-01-01 | End: 2023-07-20

## 2023-01-01 RX ORDER — HYOSCYAMINE SULFATE 0.125 MG
0.12 TABLET,DISINTEGRATING ORAL EVERY 4 HOURS PRN
Status: DISCONTINUED | OUTPATIENT
Start: 2023-01-01 | End: 2023-07-20

## 2023-01-01 RX ORDER — SODIUM BICARBONATE 650 MG/1
325 TABLET ORAL AS NEEDED
Status: DISCONTINUED | OUTPATIENT
Start: 2023-01-01 | End: 2023-01-01

## 2023-01-01 RX ORDER — VANCOMYCIN HYDROCHLORIDE 50 MG/ML
125 KIT ORAL DAILY
Qty: 105 ML | Refills: 0 | Status: ON HOLD | OUTPATIENT
Start: 2023-01-01 | End: 2023-01-01

## 2023-01-01 RX ORDER — ONDANSETRON 4 MG/1
4 TABLET, ORALLY DISINTEGRATING ORAL EVERY 6 HOURS PRN
Status: DISCONTINUED | OUTPATIENT
Start: 2023-01-01 | End: 2023-01-01

## 2023-01-01 RX ORDER — PANTOPRAZOLE SODIUM 40 MG/1
1 FOR SUSPENSION ORAL 2 TIMES DAILY
Status: ON HOLD | COMMUNITY
End: 2023-01-01

## 2023-01-01 RX ORDER — LIDOCAINE HYDROCHLORIDE 20 MG/ML
10 SOLUTION OROPHARYNGEAL ONCE
Status: DISCONTINUED | OUTPATIENT
Start: 2023-01-01 | End: 2023-01-01

## 2023-01-01 RX ORDER — MORPHINE SULFATE 2 MG/ML
2 INJECTION, SOLUTION INTRAMUSCULAR; INTRAVENOUS EVERY 2 HOUR PRN
Status: DISCONTINUED | OUTPATIENT
Start: 2023-01-01 | End: 2023-01-01

## 2023-01-01 RX ORDER — METRONIDAZOLE 500 MG/100ML
500 INJECTION, SOLUTION INTRAVENOUS ONCE
Status: COMPLETED | OUTPATIENT
Start: 2023-01-01 | End: 2023-01-01

## 2023-01-01 RX ORDER — LINEZOLID 2 MG/ML
600 INJECTION, SOLUTION INTRAVENOUS EVERY 12 HOURS
Status: ON HOLD | COMMUNITY
End: 2023-01-01 | Stop reason: CLARIF

## 2023-01-01 RX ORDER — SCOLOPAMINE TRANSDERMAL SYSTEM 1 MG/1
2 PATCH, EXTENDED RELEASE TRANSDERMAL
Status: DISCONTINUED | OUTPATIENT
Start: 2023-01-01 | End: 2023-07-20

## 2023-01-01 RX ORDER — ATORVASTATIN CALCIUM 40 MG/1
40 TABLET, FILM COATED ORAL NIGHTLY
Status: DISCONTINUED | OUTPATIENT
Start: 2023-01-01 | End: 2023-01-01

## 2023-01-01 RX ORDER — ONDANSETRON 2 MG/ML
4 INJECTION INTRAMUSCULAR; INTRAVENOUS EVERY 6 HOURS PRN
Status: DISCONTINUED | OUTPATIENT
Start: 2023-01-01 | End: 2023-07-20

## 2023-01-01 RX ORDER — SODIUM CHLORIDE 9 MG/ML
INJECTION, SOLUTION INTRAVENOUS CONTINUOUS
Status: DISCONTINUED | OUTPATIENT
Start: 2023-01-01 | End: 2023-01-01

## 2023-01-01 RX ORDER — SCOLOPAMINE TRANSDERMAL SYSTEM 1 MG/1
1 PATCH, EXTENDED RELEASE TRANSDERMAL
Status: DISCONTINUED | OUTPATIENT
Start: 2023-01-01 | End: 2023-01-01

## 2023-01-01 RX ORDER — BISACODYL 10 MG
10 SUPPOSITORY, RECTAL RECTAL
Status: DISCONTINUED | OUTPATIENT
Start: 2023-01-01 | End: 2023-07-20

## 2023-01-01 RX ORDER — CALCIUM CARBONATE-CHOLECALCIFEROL TAB 250 MG-125 UNIT 250-125 MG-UNIT
2 TAB ORAL 2 TIMES DAILY
Status: DISCONTINUED | OUTPATIENT
Start: 2023-01-01 | End: 2023-01-01

## 2023-01-01 RX ORDER — ACETAMINOPHEN 650 MG/1
650 SUPPOSITORY RECTAL EVERY 6 HOURS SCHEDULED
Status: DISCONTINUED | OUTPATIENT
Start: 2023-01-01 | End: 2023-01-01

## 2023-01-01 RX ORDER — ARGININE/ASCORBATE SOD/VITE AC 4.5 G/9.2G
1 POWDER IN PACKET (EA) ORAL 2 TIMES DAILY
Status: ON HOLD | COMMUNITY
End: 2023-01-01

## 2023-01-01 RX ORDER — PHENYTOIN 125 MG/5ML
200 SUSPENSION ORAL 2 TIMES DAILY
Status: DISCONTINUED | OUTPATIENT
Start: 2023-01-01 | End: 2023-01-01

## 2023-01-01 RX ORDER — SODIUM CHLORIDE 0.9 % (FLUSH) 0.9 %
10 SYRINGE (ML) INJECTION AS NEEDED
Status: DISCONTINUED | OUTPATIENT
Start: 2023-01-01 | End: 2023-07-20

## 2023-01-01 RX ORDER — MAGNESIUM OXIDE 400 MG/1
400 TABLET ORAL DAILY
Status: DISCONTINUED | OUTPATIENT
Start: 2023-01-01 | End: 2023-01-01

## 2023-01-01 RX ORDER — ZINC SULFATE 50(220)MG
220 CAPSULE ORAL DAILY
Status: DISCONTINUED | OUTPATIENT
Start: 2023-01-01 | End: 2023-01-01

## 2023-01-01 RX ORDER — BISACODYL 10 MG
10 SUPPOSITORY, RECTAL RECTAL
Status: CANCELLED | OUTPATIENT
Start: 2023-01-01

## 2023-01-01 RX ORDER — ATROPINE SULFATE 10 MG/ML
2 SOLUTION/ DROPS OPHTHALMIC EVERY 2 HOUR PRN
Status: DISCONTINUED | OUTPATIENT
Start: 2023-01-01 | End: 2023-07-20

## 2023-01-01 RX ORDER — RUFINAMIDE 40 MG/ML
1 SUSPENSION ORAL DAILY
Status: DISCONTINUED | OUTPATIENT
Start: 2023-01-01 | End: 2023-01-01

## 2023-01-01 RX ORDER — METOCLOPRAMIDE 5 MG/1
5 TABLET ORAL
Status: ON HOLD | COMMUNITY
End: 2023-01-01

## 2023-01-01 RX ORDER — HALOPERIDOL 5 MG/ML
2 INJECTION INTRAMUSCULAR
Status: DISCONTINUED | OUTPATIENT
Start: 2023-01-01 | End: 2023-07-20

## 2023-01-01 RX ORDER — DEXTROSE AND SODIUM CHLORIDE 5; .45 G/100ML; G/100ML
INJECTION, SOLUTION INTRAVENOUS CONTINUOUS
Status: DISCONTINUED | OUTPATIENT
Start: 2023-01-01 | End: 2023-01-01

## 2023-01-01 RX ORDER — HALOPERIDOL 5 MG/ML
1 INJECTION INTRAMUSCULAR
Status: DISCONTINUED | OUTPATIENT
Start: 2023-01-01 | End: 2023-07-20

## 2023-01-01 RX ORDER — BISACODYL 10 MG
10 SUPPOSITORY, RECTAL RECTAL
Status: DISCONTINUED | OUTPATIENT
Start: 2023-01-01 | End: 2023-01-01

## 2023-01-01 RX ORDER — ACETAMINOPHEN 650 MG/1
650 SUPPOSITORY RECTAL EVERY 4 HOURS PRN
Status: DISCONTINUED | OUTPATIENT
Start: 2023-01-01 | End: 2023-07-20

## 2023-01-01 RX ORDER — CLINDAMYCIN PHOSPHATE 600 MG/50ML
600 INJECTION INTRAVENOUS ONCE
Status: COMPLETED | OUTPATIENT
Start: 2023-01-01 | End: 2023-01-01

## 2023-01-01 RX ORDER — AMINO ACIDS/PROTEIN HYDROLYS 15G-100/30
30 LIQUID (ML) ORAL 3 TIMES DAILY
Status: ON HOLD | COMMUNITY
End: 2023-01-01

## 2023-01-01 RX ORDER — FUROSEMIDE 40 MG/1
40 TABLET ORAL EVERY 8 HOURS PRN
Status: DISCONTINUED | OUTPATIENT
Start: 2023-01-01 | End: 2023-01-01

## 2023-01-01 RX ORDER — LORAZEPAM 2 MG/ML
2 INJECTION INTRAMUSCULAR EVERY 4 HOURS PRN
Status: DISCONTINUED | OUTPATIENT
Start: 2023-01-01 | End: 2023-07-20

## 2023-01-01 RX ORDER — SODIUM HYPOCHLORITE 1.25 MG/ML
SOLUTION TOPICAL EVERY 12 HOURS
Status: DISCONTINUED | OUTPATIENT
Start: 2023-01-01 | End: 2023-01-01

## 2023-04-01 ENCOUNTER — APPOINTMENT (OUTPATIENT)
Dept: GENERAL RADIOLOGY | Facility: HOSPITAL | Age: 73
End: 2023-04-01
Attending: EMERGENCY MEDICINE
Payer: MEDICARE

## 2023-04-01 ENCOUNTER — HOSPITAL ENCOUNTER (INPATIENT)
Facility: HOSPITAL | Age: 73
LOS: 3 days | Discharge: NURSING, REHAB OR LONG-TERM CARE | End: 2023-04-04
Attending: EMERGENCY MEDICINE | Admitting: INTERNAL MEDICINE
Payer: MEDICARE

## 2023-04-01 ENCOUNTER — APPOINTMENT (OUTPATIENT)
Dept: CT IMAGING | Facility: HOSPITAL | Age: 73
End: 2023-04-01
Attending: EMERGENCY MEDICINE
Payer: MEDICARE

## 2023-04-01 DIAGNOSIS — A41.9 SEVERE SEPSIS (HCC): ICD-10-CM

## 2023-04-01 DIAGNOSIS — J69.0 ASPIRATION PNEUMONITIS (HCC): ICD-10-CM

## 2023-04-01 DIAGNOSIS — R65.20 SEVERE SEPSIS (HCC): ICD-10-CM

## 2023-04-01 DIAGNOSIS — K92.2 UPPER GI BLEED: Primary | ICD-10-CM

## 2023-04-01 LAB
ANION GAP SERPL CALC-SCNC: 7 MMOL/L (ref 0–18)
ATRIAL RATE: 134 BPM
BASOPHILS # BLD AUTO: 0.03 X10(3) UL (ref 0–0.2)
BASOPHILS NFR BLD AUTO: 0.2 %
BILIRUB UR QL: NEGATIVE
BUN BLD-MCNC: 26 MG/DL (ref 7–18)
BUN/CREAT SERPL: 40.6 (ref 10–20)
CALCIUM BLD-MCNC: 8.9 MG/DL (ref 8.5–10.1)
CHLORIDE SERPL-SCNC: 107 MMOL/L (ref 98–112)
CLARITY UR: CLEAR
CO2 SERPL-SCNC: 31 MMOL/L (ref 21–32)
COLOR UR: YELLOW
CREAT BLD-MCNC: 0.64 MG/DL
DEPRECATED RDW RBC AUTO: 55.1 FL (ref 35.1–46.3)
EOSINOPHIL # BLD AUTO: 0.01 X10(3) UL (ref 0–0.7)
EOSINOPHIL NFR BLD AUTO: 0.1 %
ERYTHROCYTE [DISTWIDTH] IN BLOOD BY AUTOMATED COUNT: 17.2 % (ref 11–15)
FLUAV + FLUBV RNA SPEC NAA+PROBE: NEGATIVE
FLUAV + FLUBV RNA SPEC NAA+PROBE: NEGATIVE
GFR SERPLBLD BASED ON 1.73 SQ M-ARVRAT: 94 ML/MIN/1.73M2 (ref 60–?)
GLUCOSE BLD-MCNC: 130 MG/DL (ref 70–99)
GLUCOSE UR-MCNC: NORMAL MG/DL
HCT VFR BLD AUTO: 28.8 %
HCT VFR BLD AUTO: 35 %
HGB BLD-MCNC: 10.7 G/DL
HGB BLD-MCNC: 8.5 G/DL
HGB UR QL STRIP.AUTO: NEGATIVE
HYALINE CASTS #/AREA URNS AUTO: PRESENT /LPF
IMM GRANULOCYTES # BLD AUTO: 0.06 X10(3) UL (ref 0–1)
IMM GRANULOCYTES NFR BLD: 0.4 %
LACTATE SERPL-SCNC: 0.8 MMOL/L (ref 0.4–2)
LACTATE SERPL-SCNC: 2.3 MMOL/L (ref 0.4–2)
LEUKOCYTE ESTERASE UR QL STRIP.AUTO: NEGATIVE
LYMPHOCYTES # BLD AUTO: 1.45 X10(3) UL (ref 1–4)
LYMPHOCYTES NFR BLD AUTO: 10.3 %
MCH RBC QN AUTO: 26.7 PG (ref 26–34)
MCHC RBC AUTO-ENTMCNC: 30.6 G/DL (ref 31–37)
MCV RBC AUTO: 87.3 FL
MONOCYTES # BLD AUTO: 0.65 X10(3) UL (ref 0.1–1)
MONOCYTES NFR BLD AUTO: 4.6 %
NEUTROPHILS # BLD AUTO: 11.85 X10 (3) UL (ref 1.5–7.7)
NEUTROPHILS # BLD AUTO: 11.85 X10(3) UL (ref 1.5–7.7)
NEUTROPHILS NFR BLD AUTO: 84.4 %
NITRITE UR QL STRIP.AUTO: NEGATIVE
OSMOLALITY SERPL CALC.SUM OF ELEC: 307 MOSM/KG (ref 275–295)
P AXIS: 48 DEGREES
P-R INTERVAL: 142 MS
PH UR: 6.5 [PH] (ref 5–8)
PLATELET # BLD AUTO: 347 10(3)UL (ref 150–450)
POTASSIUM SERPL-SCNC: 4.6 MMOL/L (ref 3.5–5.1)
PROT UR-MCNC: 100 MG/DL
Q-T INTERVAL: 292 MS
QRS DURATION: 62 MS
QTC CALCULATION (BEZET): 436 MS
R AXIS: -4 DEGREES
RBC # BLD AUTO: 4.01 X10(6)UL
RSV RNA SPEC NAA+PROBE: NEGATIVE
SARS-COV-2 RNA RESP QL NAA+PROBE: NOT DETECTED
SARS-COV-2 RNA RESP QL NAA+PROBE: NOT DETECTED
SODIUM SERPL-SCNC: 145 MMOL/L (ref 136–145)
SP GR UR STRIP: >1.03 (ref 1–1.03)
T AXIS: 63 DEGREES
UROBILINOGEN UR STRIP-ACNC: 6
VENTRICULAR RATE: 134 BPM
WBC # BLD AUTO: 14.1 X10(3) UL (ref 4–11)

## 2023-04-01 PROCEDURE — 71045 X-RAY EXAM CHEST 1 VIEW: CPT | Performed by: EMERGENCY MEDICINE

## 2023-04-01 PROCEDURE — 85018 HEMOGLOBIN: CPT | Performed by: INTERNAL MEDICINE

## 2023-04-01 PROCEDURE — 83605 ASSAY OF LACTIC ACID: CPT | Performed by: EMERGENCY MEDICINE

## 2023-04-01 PROCEDURE — C9113 INJ PANTOPRAZOLE SODIUM, VIA: HCPCS | Performed by: EMERGENCY MEDICINE

## 2023-04-01 PROCEDURE — 74177 CT ABD & PELVIS W/CONTRAST: CPT | Performed by: EMERGENCY MEDICINE

## 2023-04-01 PROCEDURE — 80048 BASIC METABOLIC PNL TOTAL CA: CPT | Performed by: EMERGENCY MEDICINE

## 2023-04-01 PROCEDURE — 99285 EMERGENCY DEPT VISIT HI MDM: CPT

## 2023-04-01 PROCEDURE — C9113 INJ PANTOPRAZOLE SODIUM, VIA: HCPCS | Performed by: INTERNAL MEDICINE

## 2023-04-01 PROCEDURE — 96375 TX/PRO/DX INJ NEW DRUG ADDON: CPT

## 2023-04-01 PROCEDURE — 93010 ELECTROCARDIOGRAM REPORT: CPT

## 2023-04-01 PROCEDURE — 87040 BLOOD CULTURE FOR BACTERIA: CPT | Performed by: EMERGENCY MEDICINE

## 2023-04-01 PROCEDURE — 85025 COMPLETE CBC W/AUTO DIFF WBC: CPT | Performed by: EMERGENCY MEDICINE

## 2023-04-01 PROCEDURE — 85014 HEMATOCRIT: CPT | Performed by: INTERNAL MEDICINE

## 2023-04-01 PROCEDURE — 93005 ELECTROCARDIOGRAM TRACING: CPT

## 2023-04-01 PROCEDURE — 96365 THER/PROPH/DIAG IV INF INIT: CPT

## 2023-04-01 PROCEDURE — 81001 URINALYSIS AUTO W/SCOPE: CPT | Performed by: EMERGENCY MEDICINE

## 2023-04-01 PROCEDURE — 96361 HYDRATE IV INFUSION ADD-ON: CPT

## 2023-04-01 PROCEDURE — 82962 GLUCOSE BLOOD TEST: CPT

## 2023-04-01 PROCEDURE — 96367 TX/PROPH/DG ADDL SEQ IV INF: CPT

## 2023-04-01 PROCEDURE — 0241U SARS-COV-2/FLU A AND B/RSV BY PCR (GENEXPERT): CPT | Performed by: EMERGENCY MEDICINE

## 2023-04-01 PROCEDURE — 36415 COLL VENOUS BLD VENIPUNCTURE: CPT

## 2023-04-01 RX ORDER — ACETAMINOPHEN 650 MG/1
650 SUPPOSITORY RECTAL ONCE
Status: COMPLETED | OUTPATIENT
Start: 2023-04-01 | End: 2023-04-01

## 2023-04-01 RX ORDER — PHENYTOIN 125 MG/5ML
200 SUSPENSION ORAL 2 TIMES DAILY
Status: DISCONTINUED | OUTPATIENT
Start: 2023-04-01 | End: 2023-04-04

## 2023-04-01 RX ORDER — BISACODYL 10 MG
10 SUPPOSITORY, RECTAL RECTAL
Status: DISCONTINUED | OUTPATIENT
Start: 2023-04-01 | End: 2023-04-04

## 2023-04-01 RX ORDER — POLYETHYLENE GLYCOL 3350 17 G/17G
17 POWDER, FOR SOLUTION ORAL DAILY
Status: DISCONTINUED | OUTPATIENT
Start: 2023-04-01 | End: 2023-04-04

## 2023-04-01 RX ORDER — METOPROLOL TARTRATE 50 MG/1
50 TABLET, FILM COATED ORAL 2 TIMES DAILY
Status: DISCONTINUED | OUTPATIENT
Start: 2023-04-01 | End: 2023-04-04

## 2023-04-01 RX ORDER — ONDANSETRON 2 MG/ML
4 INJECTION INTRAMUSCULAR; INTRAVENOUS ONCE
Status: COMPLETED | OUTPATIENT
Start: 2023-04-01 | End: 2023-04-01

## 2023-04-01 RX ORDER — MAGNESIUM OXIDE 400 MG/1
400 TABLET ORAL DAILY
Status: DISCONTINUED | OUTPATIENT
Start: 2023-04-01 | End: 2023-04-04

## 2023-04-01 RX ORDER — VANCOMYCIN HYDROCHLORIDE 125 MG/1
125 CAPSULE ORAL DAILY
Status: DISCONTINUED | OUTPATIENT
Start: 2023-04-01 | End: 2023-04-04

## 2023-04-01 RX ORDER — MELATONIN
325 DAILY
Status: DISCONTINUED | OUTPATIENT
Start: 2023-04-01 | End: 2023-04-04

## 2023-04-01 RX ORDER — SENNOSIDES 8.8 MG/5ML
5 LIQUID ORAL NIGHTLY
Status: DISCONTINUED | OUTPATIENT
Start: 2023-04-01 | End: 2023-04-04

## 2023-04-01 RX ORDER — CALCIUM CARBONATE-CHOLECALCIFEROL TAB 250 MG-125 UNIT 250-125 MG-UNIT
1 TAB ORAL 2 TIMES DAILY
Status: DISCONTINUED | OUTPATIENT
Start: 2023-04-01 | End: 2023-04-04

## 2023-04-01 RX ORDER — ATORVASTATIN CALCIUM 40 MG/1
40 TABLET, FILM COATED ORAL NIGHTLY
Status: DISCONTINUED | OUTPATIENT
Start: 2023-04-01 | End: 2023-04-04

## 2023-04-01 RX ORDER — ACETAMINOPHEN 325 MG/1
650 TABLET ORAL 2 TIMES DAILY PRN
Status: DISCONTINUED | OUTPATIENT
Start: 2023-04-01 | End: 2023-04-04

## 2023-04-01 RX ORDER — MELATONIN
AS NEEDED
Status: DISCONTINUED | OUTPATIENT
Start: 2023-04-01 | End: 2023-04-04

## 2023-04-01 RX ORDER — ERGOCALCIFEROL 1.25 MG/1
50000 CAPSULE ORAL WEEKLY
Status: DISCONTINUED | OUTPATIENT
Start: 2023-04-02 | End: 2023-04-04

## 2023-04-01 RX ORDER — DOXEPIN HYDROCHLORIDE 50 MG/1
1 CAPSULE ORAL DAILY
Status: DISCONTINUED | OUTPATIENT
Start: 2023-04-01 | End: 2023-04-04

## 2023-04-01 NOTE — ED INITIAL ASSESSMENT (HPI)
Patient arrives via EMS from the [facility]. Patient was receiving tube feeds when she had an episode of vomiting that was described as \"coffee ground emesis\". Patient had a drop in O2 sats and was bumped from baseline 3L to 5L O2.  Patient appears diaphoretic on arrival.

## 2023-04-01 NOTE — ED QUICK NOTES
Received call from emy greco, at [facility] on-call, who manages pt's care at the [place]. Clinical update provided. Pt's brother christos (sp?) is healthcare poa. Emy greco will call pt's brother and provide update. All questions and concerns addressed.

## 2023-04-01 NOTE — ED QUICK NOTES
Orders for admission. Patient and/or next of kin aware of plan and is ready to go upstairs. Please call ED RN Kathe Sauer at listed extension should you have any further questions or concerns. Thank you. Nurse and Ext: [address] [facility], [phone]    Chief Presentation: NVD    Admission Diagnosis: UPPER GI BLEED, ASPIRATION PNEUMONITIS, SEVERE SEPSIS    Admitting/Attending Physician: Sidra Ortez; WALTER (GI); SINDY (ID)    Neuro: A&OX SELF. NON-VERBAL. RESPONDS TO PAIN    Cardiac: ST    Resp: 3L NC (CHRONIC)    GI: G-TUBE. DIAPERED. GASTRO-OCCULT +    : DIAPERED. [facility], [place], operated by [facility] IN PLACE. Skin/IV: L WRIST (20, EMS) PATENT AND SALINE LOCKED. L AC (20) PATENT AND INFUSING NS BOLUS (917CC; BAG 2 OF 2) AND 30-MINUTE MEROPENEM INFUSION.     Other Pertinent Information: N/A    Type of COVID Test Sent: RAPID/GENEXPERT    COVID Level of Suspicion: LOW    PRIMARY CARE PARTNER:

## 2023-04-01 NOTE — PLAN OF CARE
Problem: ALTERED NUTRIENT INTAKE - ADULT  Goal: Nutrient intake appropriate for improving, restoring or maintaining nutritional needs  Description: INTERVENTIONS:  - Assess nutritional status and recommend course of action  - Monitor abs, and treatment plans  - Recommend vitamin/mineral supplements  - Recommend, monitor, and adjust tube feedings  based on assessed needs  - Provide specific nutrition education as appropriate  Outcome: Not Progressing

## 2023-04-01 NOTE — ED QUICK NOTES
Pt had emetic episode at bedside. 50cc of coffee-ground colored output. Pt is gastro-occult positive (+).  Edmd informed of the above

## 2023-04-02 LAB
ANION GAP SERPL CALC-SCNC: 5 MMOL/L (ref 0–18)
BASOPHILS # BLD AUTO: 0.01 X10(3) UL (ref 0–0.2)
BASOPHILS NFR BLD AUTO: 0.1 %
BUN BLD-MCNC: 21 MG/DL (ref 7–18)
BUN/CREAT SERPL: 91.3 (ref 10–20)
CALCIUM BLD-MCNC: 9 MG/DL (ref 8.5–10.1)
CHLORIDE SERPL-SCNC: 114 MMOL/L (ref 98–112)
CO2 SERPL-SCNC: 27 MMOL/L (ref 21–32)
CREAT BLD-MCNC: 0.23 MG/DL
DEPRECATED RDW RBC AUTO: 54.2 FL (ref 35.1–46.3)
EOSINOPHIL # BLD AUTO: 0.13 X10(3) UL (ref 0–0.7)
EOSINOPHIL NFR BLD AUTO: 1.8 %
ERYTHROCYTE [DISTWIDTH] IN BLOOD BY AUTOMATED COUNT: 16.5 % (ref 11–15)
GFR SERPLBLD BASED ON 1.73 SQ M-ARVRAT: 121 ML/MIN/1.73M2 (ref 60–?)
GLUCOSE BLD-MCNC: 78 MG/DL (ref 70–99)
GLUCOSE BLDC GLUCOMTR-MCNC: 116 MG/DL (ref 70–99)
GLUCOSE BLDC GLUCOMTR-MCNC: 77 MG/DL (ref 70–99)
GLUCOSE BLDC GLUCOMTR-MCNC: 79 MG/DL (ref 70–99)
GLUCOSE BLDC GLUCOMTR-MCNC: 97 MG/DL (ref 70–99)
HCT VFR BLD AUTO: 27.6 %
HGB BLD-MCNC: 8.1 G/DL
IMM GRANULOCYTES # BLD AUTO: 0.02 X10(3) UL (ref 0–1)
IMM GRANULOCYTES NFR BLD: 0.3 %
LYMPHOCYTES # BLD AUTO: 1.07 X10(3) UL (ref 1–4)
LYMPHOCYTES NFR BLD AUTO: 14.7 %
MCH RBC QN AUTO: 26.3 PG (ref 26–34)
MCHC RBC AUTO-ENTMCNC: 29.3 G/DL (ref 31–37)
MCV RBC AUTO: 89.6 FL
MONOCYTES # BLD AUTO: 0.41 X10(3) UL (ref 0.1–1)
MONOCYTES NFR BLD AUTO: 5.6 %
NEUTROPHILS # BLD AUTO: 5.63 X10 (3) UL (ref 1.5–7.7)
NEUTROPHILS # BLD AUTO: 5.63 X10(3) UL (ref 1.5–7.7)
NEUTROPHILS NFR BLD AUTO: 77.5 %
OSMOLALITY SERPL CALC.SUM OF ELEC: 304 MOSM/KG (ref 275–295)
PLATELET # BLD AUTO: 245 10(3)UL (ref 150–450)
POTASSIUM SERPL-SCNC: 3.5 MMOL/L (ref 3.5–5.1)
RBC # BLD AUTO: 3.08 X10(6)UL
SODIUM SERPL-SCNC: 146 MMOL/L (ref 136–145)
WBC # BLD AUTO: 7.3 X10(3) UL (ref 4–11)

## 2023-04-02 PROCEDURE — C9113 INJ PANTOPRAZOLE SODIUM, VIA: HCPCS | Performed by: INTERNAL MEDICINE

## 2023-04-02 PROCEDURE — 82962 GLUCOSE BLOOD TEST: CPT

## 2023-04-02 PROCEDURE — 85025 COMPLETE CBC W/AUTO DIFF WBC: CPT | Performed by: INTERNAL MEDICINE

## 2023-04-02 PROCEDURE — 80048 BASIC METABOLIC PNL TOTAL CA: CPT | Performed by: INTERNAL MEDICINE

## 2023-04-02 RX ORDER — SODIUM BICARBONATE 650 MG/1
325 TABLET ORAL AS NEEDED
Status: DISCONTINUED | OUTPATIENT
Start: 2023-04-02 | End: 2023-04-04

## 2023-04-02 NOTE — PLAN OF CARE
Problem: RESPIRATORY - ADULT  Goal: Achieves optimal ventilation and oxygenation  Description: INTERVENTIONS:  - Assess for changes in respiratory status  - Assess for changes in mentation and behavior  - Position to facilitate oxygenation and minimize respiratory effort  - Oxygen supplementation based on oxygen saturation or ABGs  - Provide Smoking Cessation handout, if applicable  - Encourage broncho-pulmonary hygiene including cough, deep breathe, Incentive Spirometry  - Assess the need for suctioning and perform as needed  - Assess and instruct to report SOB or any respiratory difficulty  - Respiratory Therapy support as indicated  - Manage/alleviate anxiety  - Monitor for signs/symptoms of CO2 retention  Outcome: Progressing     Problem: PAIN - ADULT  Goal: Verbalizes/displays adequate comfort level or patient's stated pain goal  Description: INTERVENTIONS:  - Encourage pt to monitor pain and request assistance  - Assess pain using appropriate pain scale  - Administer analgesics based on type and severity of pain and evaluate response  - Implement non-pharmacological measures as appropriate and evaluate response  - Consider cultural and social influences on pain and pain management  - Manage/alleviate anxiety  - Utilize distraction and/or relaxation techniques  - Monitor for opioid side effects  - Notify MD/LIP if interventions unsuccessful or patient reports new pain  - Anticipate increased pain with activity and pre-medicate as appropriate  4/1/2023 1942 by Glendy Melchor RN  Outcome: Progressing  4/1/2023 1941 by Glendy Melchor RN  Outcome: Progressing     Problem: RISK FOR INFECTION - ADULT  Goal: Absence of fever/infection during anticipated neutropenic period  Description: INTERVENTIONS  - Monitor WBC  - Administer growth factors as ordered  - Implement neutropenic guidelines  4/1/2023 1942 by Glendy Melchor RN  Outcome: Progressing  4/1/2023 1941 by Glendy Melchor RN  Outcome: Progressing

## 2023-04-02 NOTE — PLAN OF CARE
Patient is A&Ox0, room air, complete Q2 turns. Patient has continuous feed at rate 60 with Q4 200mL flushes. Continuing IV antibiotics. Plan to keep monitoring hemoglobin. Call light within reach and fall precautions in place.    Problem: RESPIRATORY - ADULT  Goal: Achieves optimal ventilation and oxygenation  Description: INTERVENTIONS:  - Assess for changes in respiratory status  - Assess for changes in mentation and behavior  - Position to facilitate oxygenation and minimize respiratory effort  - Oxygen supplementation based on oxygen saturation or ABGs  - Provide Smoking Cessation handout, if applicable  - Encourage broncho-pulmonary hygiene including cough, deep breathe, Incentive Spirometry  - Assess the need for suctioning and perform as needed  - Assess and instruct to report SOB or any respiratory difficulty  - Respiratory Therapy support as indicated  - Manage/alleviate anxiety  - Monitor for signs/symptoms of CO2 retention  Outcome: Progressing     Problem: PAIN - ADULT  Goal: Verbalizes/displays adequate comfort level or patient's stated pain goal  Description: INTERVENTIONS:  - Encourage pt to monitor pain and request assistance  - Assess pain using appropriate pain scale  - Administer analgesics based on type and severity of pain and evaluate response  - Implement non-pharmacological measures as appropriate and evaluate response  - Consider cultural and social influences on pain and pain management  - Manage/alleviate anxiety  - Utilize distraction and/or relaxation techniques  - Monitor for opioid side effects  - Notify MD/LIP if interventions unsuccessful or patient reports new pain  - Anticipate increased pain with activity and pre-medicate as appropriate  Outcome: Progressing     Problem: RISK FOR INFECTION - ADULT  Goal: Absence of fever/infection during anticipated neutropenic period  Description: INTERVENTIONS  - Monitor WBC  - Administer growth factors as ordered  - Implement neutropenic guidelines  Outcome: Progressing     Problem: Patient Centered Care  Goal: Patient preferences are identified and integrated in the patient's plan of care  Description: Interventions:  - What would you like us to know as we care for you? From the [facility]  - Provide timely, complete, and accurate information to patient/family  - Incorporate patient and family knowledge, values, beliefs, and cultural backgrounds into the planning and delivery of care  - Encourage patient/family to participate in care and decision-making at the level they choose  - Honor patient and family perspectives and choices  Outcome: Progressing     Problem: Patient/Family Goals  Goal: Patient/Family Long Term Goal  Description: Patient's Long Term Goal: To go back to [facility]    Interventions:  - Follow medical regimen  - See additional Care Plan goals for specific interventions  Outcome: Progressing  Goal: Patient/Family Short Term Goal  Description: Patient's Short Term Goal: To have stable Hmgb    Interventions:   -Follow medical regimen  - See additional Care Plan goals for specific interventions  Outcome: Progressing

## 2023-04-02 NOTE — DIETARY NOTE
ADULT NUTRITION UPDATE NOTE      RECOMMENDATIONS TO MD:  RD ordered EN feeds per protocol. Increaed H2O flushes r/t hypernatremia. Monitor for further adjustment. See Nutrition Intervention for EN specifics     PATIENT STATUS:   UPDATE 04/02/23: Received consult to resume EN feeds. Noted wound charted on 4/1 - stage IV to sacrum. Protein needs adjusted/increased. Receiving Dilantin BID. On protonix (typically on Prilosec at [facility]). IVFs stopped on 4/1. Noted hypernatremia today - estimated free water deficit 1.4L. NUTRITION INTERVENTION:     NUTRITION PRESCRIPTION:   Estimated Nutrition needs: Dosing wt of 71.7 kg -- (wt taken on 4/1)  Calories: 2984-4926 calories/day (25-30 calories per kg Dosing wt)  Protein: 107-143 g protein/day (1.5-2 g protein/kg  Dosing wt)   Fluids: 2160 ml/day (30 ml/kg chronological age method)    - Diet: NPO  - Enteral Nutrition: Osmolite 1.5 at goal rate 60 ml/hr per G-Tube/PEG Hold per protocol for Dilantin administration. Based on average ~20 hour infusion time Goal rate [id number] total TF volume, 1800 kcal, 75 grams protein, 914ml total free water, and 100% RDI's. Flush with 200 ml H2O q 4 hrs (to provide 1200 ml total H2O from FWF daily). Monitor Na level and adjust/decrease FWF once within acceptable limits  Provide 1 packets Prosource TID (to provide 120 kcal, 33 g protein and 135 ml daily)  TF with Prosource and H2O flushes will provide 1920 kcal (27 kcal/kg), 108 g protein (1.5 g/kg) and 2249 ml fluids (31 ml/kg) total daily. RD will follow up per protocol.      [facility] [address], [address], [address], [address] [facility]

## 2023-04-02 NOTE — PLAN OF CARE
Pt A/O to self only, which is baseline. Satting well on 1LO2. BP low, metoprolol held. Sacral wound dressing changed, IV ABX continued. Meds via G tube per MAR, no acute events. Safety measures in place.    Problem: RESPIRATORY - ADULT  Goal: Achieves optimal ventilation and oxygenation  Description: INTERVENTIONS:  - Assess for changes in respiratory status  - Assess for changes in mentation and behavior  - Position to facilitate oxygenation and minimize respiratory effort  - Oxygen supplementation based on oxygen saturation or ABGs  - Provide Smoking Cessation handout, if applicable  - Encourage broncho-pulmonary hygiene including cough, deep breathe, Incentive Spirometry  - Assess the need for suctioning and perform as needed  - Assess and instruct to report SOB or any respiratory difficulty  - Respiratory Therapy support as indicated  - Manage/alleviate anxiety  - Monitor for signs/symptoms of CO2 retention  Outcome: Progressing     Problem: PAIN - ADULT  Goal: Verbalizes/displays adequate comfort level or patient's stated pain goal  Description: INTERVENTIONS:  - Encourage pt to monitor pain and request assistance  - Assess pain using appropriate pain scale  - Administer analgesics based on type and severity of pain and evaluate response  - Implement non-pharmacological measures as appropriate and evaluate response  - Consider cultural and social influences on pain and pain management  - Manage/alleviate anxiety  - Utilize distraction and/or relaxation techniques  - Monitor for opioid side effects  - Notify MD/LIP if interventions unsuccessful or patient reports new pain  - Anticipate increased pain with activity and pre-medicate as appropriate  Outcome: Progressing     Problem: RISK FOR INFECTION - ADULT  Goal: Absence of fever/infection during anticipated neutropenic period  Description: INTERVENTIONS  - Monitor WBC  - Administer growth factors as ordered  - Implement neutropenic guidelines  Outcome: Progressing     Problem: Patient Centered Care  Goal: Patient preferences are identified and integrated in the patient's plan of care  Description: Interventions:  - What would you like us to know as we care for you?  TBI at age 4  - Provide timely, complete, and accurate information to patient/family  - Incorporate patient and family knowledge, values, beliefs, and cultural backgrounds into the planning and delivery of care  - Encourage patient/family to participate in care and decision-making at the level they choose  - Honor patient and family perspectives and choices  Outcome: Progressing     Problem: Patient/Family Goals  Goal: Patient/Family Long Term Goal  Description: [facility]'s [facility]    Interventions:  - confirm GIB  -consults  -med clear  - See additional Care Plan goals for specific interventions  Outcome: Progressing  Goal: Patient/Family Short Term Goal  Description: Patient's Short Term Goal: comfort    Interventions:   - temperature adjustment  -frequent repositioning  -blankets  - See additional Care Plan goals for specific interventions  Outcome: Progressing

## 2023-04-03 LAB
GLUCOSE BLDC GLUCOMTR-MCNC: 107 MG/DL (ref 70–99)
GLUCOSE BLDC GLUCOMTR-MCNC: 110 MG/DL (ref 70–99)
GLUCOSE BLDC GLUCOMTR-MCNC: 149 MG/DL (ref 70–99)
GLUCOSE BLDC GLUCOMTR-MCNC: 83 MG/DL (ref 70–99)

## 2023-04-03 PROCEDURE — C9113 INJ PANTOPRAZOLE SODIUM, VIA: HCPCS | Performed by: INTERNAL MEDICINE

## 2023-04-03 PROCEDURE — 99213 OFFICE O/P EST LOW 20 MIN: CPT

## 2023-04-03 PROCEDURE — 82962 GLUCOSE BLOOD TEST: CPT

## 2023-04-03 NOTE — CM/SW NOTE
Department  notified of request for lamine HANNA referrals started. Assigned CM/SW to follow up with pt/family on further discharge planning.      Jonnie Eduardo   April 03, 2023   09:59

## 2023-04-03 NOTE — PLAN OF CARE
Pt. Lethargic and diaphoretic, vitals stable. Osmolite 1.5 pumping @60 ml/hr w/ 200ml/hr flushes. Incontinence and sacral wound care provided. Pt. turned, urinating through purewick well, urine output charted by PCT.    Problem: RESPIRATORY - ADULT  Goal: Achieves optimal ventilation and oxygenation  Description: INTERVENTIONS:  - Assess for changes in respiratory status  - Assess for changes in mentation and behavior  - Position to facilitate oxygenation and minimize respiratory effort  - Oxygen supplementation based on oxygen saturation or ABGs  - Provide Smoking Cessation handout, if applicable  - Encourage broncho-pulmonary hygiene including cough, deep breathe, Incentive Spirometry  - Assess the need for suctioning and perform as needed  - Assess and instruct to report SOB or any respiratory difficulty  - Respiratory Therapy support as indicated  - Manage/alleviate anxiety  - Monitor for signs/symptoms of CO2 retention  Outcome: Progressing     Problem: PAIN - ADULT  Goal: Verbalizes/displays adequate comfort level or patient's stated pain goal  Description: INTERVENTIONS:  - Encourage pt to monitor pain and request assistance  - Assess pain using appropriate pain scale  - Administer analgesics based on type and severity of pain and evaluate response  - Implement non-pharmacological measures as appropriate and evaluate response  - Consider cultural and social influences on pain and pain management  - Manage/alleviate anxiety  - Utilize distraction and/or relaxation techniques  - Monitor for opioid side effects  - Notify MD/LIP if interventions unsuccessful or patient reports new pain  - Anticipate increased pain with activity and pre-medicate as appropriate  Outcome: Progressing     Problem: RISK FOR INFECTION - ADULT  Goal: Absence of fever/infection during anticipated neutropenic period  Description: INTERVENTIONS  - Monitor WBC  - Administer growth factors as ordered  - Implement neutropenic guidelines  Outcome: Progressing     Problem: Patient Centered Care  Goal: Patient preferences are identified and integrated in the patient's plan of care  Description: Interventions:  - What would you like us to know as we care for you? From the [facility]  - Provide timely, complete, and accurate information to patient/family  - Incorporate patient and family knowledge, values, beliefs, and cultural backgrounds into the planning and delivery of care  - Encourage patient/family to participate in care and decision-making at the level they choose  - Honor patient and family perspectives and choices  Outcome: Progressing     Problem: Patient/Family Goals  Goal: Patient/Family Long Term Goal  Description: Patient's Long Term Goal: To go back to [facility]    Interventions:  - Follow medical regimen  - See additional Care Plan goals for specific interventions  Outcome: Progressing  Goal: Patient/Family Short Term Goal  Description: Patient's Short Term Goal: To have stable Hmgb    Interventions:   -Follow medical regimen  - See additional Care Plan goals for specific interventions  Outcome: Progressing

## 2023-04-03 NOTE — PROGRESS NOTES
04/03/23 1004   Wound 10/16/22 Pressure Injury Sacrum   Date First Assessed/Time First Assessed: 10/16/22 0900   Present on Hospital Admission: Yes  Primary Wound Type: Pressure Injury  Location: Sacrum   Wound Image    Site Assessment Moist;Pink;Yellow   Drainage Amount Small   Drainage Description Serosanguineous   Treatments Cleansed; Santyl   Dressing 4x4s; Aquacel Foam  (Moist saline guaze covered with bordered foam)   Dressing Changed Changed   Dressing Status Clean;Dry; Intact;Dressing Changed   Wound Length (cm) 6 cm   Wound Width (cm) 2.5 cm   Wound Surface Area (cm^2) 15 cm^2   Wound Depth (cm) 0.3 cm   Wound Volume (cm^3) 4.5 cm^3   Wound Healing % 78   Non-staged Wound Description Full thickness   Robyn-wound Assessment Clean;Dry; Intact   Wound Granulation Tissue Pink   Wound Bed Granulation (%) 40 %   Wound Bed Epithelium (%) 0 %   Wound Bed Slough (%) 60 %   Wound Bed Eschar (%) 0 %   Wound Odor None   Tunneling 4 oclock - 2cm   Undermining 12-4 oclock = .3cm   Pressure Injury Stage U   State of Healing Non-healing;Early/partial granulation; Tunneling; Undermining   Gastrostomy/Enterostomy Percutaneous endoscopic gastrostomy (PEG) 18 Fr. LUQ   Placement Date/Time: 08/08/21 2142   Inserted by: (c) DR. ROSARIO  Type: Percutaneous endoscopic gastrostomy (PEG)  Tube Size (Fr.): 18 Fr. Location: LUQ  Placement Verification: Chest x-ray   Surrounding Skin Dry; Intact   Drain Status Continuous feeding   Site Description Scabbed   Dressing Status Clean;Dry; Intact   Dressing Type Split gauze   Wound Follow Up   Follow up needed Yes       Recommendations:   May consider wound vac if agreeable and slough improves.    May consider follow up in outpatient wound clinic for further treatment and management    Dietary consult for recommendations for nutrition to optimize wound healing  Diabetic Education for optimal glucose control  Turn schedules  Heels elevated using pillows, heel wedge or heel boots to offload heels  Use of lift equipment  To prevent sliding: decrease head of bed and elevate foot of bed as medical condition tolerates  Prompt incontinence care  Glucose control to help promote wound healing    Patient seen at bedside with RN and PCT present. Patient was changed and repositioned with dressing changed. Heel boots in place and no open wounds noted. Sacral wound assessed and cleansed. Santyl is at bedside. Will continue using santyl for autolytic debridement. RN updated with plan. May consider wound vac to sacral wound if agreeable and slough improves. Will follow as needed while inpatient.

## 2023-04-03 NOTE — PLAN OF CARE
Patient still at baseline. Given bath and full linen change this AM. Tube feeds running continuously at goal. Continuing with IV abx per ID. Repositioned every 2 hours. Oral care and flakita care provided.      Problem: RESPIRATORY - ADULT  Goal: Achieves optimal ventilation and oxygenation  Description: INTERVENTIONS:  - Assess for changes in respiratory status  - Assess for changes in mentation and behavior  - Position to facilitate oxygenation and minimize respiratory effort  - Oxygen supplementation based on oxygen saturation or ABGs  - Provide Smoking Cessation handout, if applicable  - Encourage broncho-pulmonary hygiene including cough, deep breathe, Incentive Spirometry  - Assess the need for suctioning and perform as needed  - Assess and instruct to report SOB or any respiratory difficulty  - Respiratory Therapy support as indicated  - Manage/alleviate anxiety  - Monitor for signs/symptoms of CO2 retention  Outcome: Progressing     Problem: PAIN - ADULT  Goal: Verbalizes/displays adequate comfort level or patient's stated pain goal  Description: INTERVENTIONS:  - Encourage pt to monitor pain and request assistance  - Assess pain using appropriate pain scale  - Administer analgesics based on type and severity of pain and evaluate response  - Implement non-pharmacological measures as appropriate and evaluate response  - Consider cultural and social influences on pain and pain management  - Manage/alleviate anxiety  - Utilize distraction and/or relaxation techniques  - Monitor for opioid side effects  - Notify MD/LIP if interventions unsuccessful or patient reports new pain  - Anticipate increased pain with activity and pre-medicate as appropriate  Outcome: Progressing     Problem: RISK FOR INFECTION - ADULT  Goal: Absence of fever/infection during anticipated neutropenic period  Description: INTERVENTIONS  - Monitor WBC  - Administer growth factors as ordered  - Implement neutropenic guidelines  Outcome: Progressing     Problem: Patient Centered Care  Goal: Patient preferences are identified and integrated in the patient's plan of care  Description: Interventions:  - What would you like us to know as we care for you? From the [facility]  - Provide timely, complete, and accurate information to patient/family  - Incorporate patient and family knowledge, values, beliefs, and cultural backgrounds into the planning and delivery of care  - Encourage patient/family to participate in care and decision-making at the level they choose  - Honor patient and family perspectives and choices  Outcome: Progressing     Problem: Patient/Family Goals  Goal: Patient/Family Long Term Goal  Description: Patient's Long Term Goal: To go back to [facility]    Interventions:  - Follow medical regimen  - See additional Care Plan goals for specific interventions  Outcome: Progressing  Goal: Patient/Family Short Term Goal  Description: Patient's Short Term Goal: To have stable Hmgb    Interventions:   -Follow medical regimen  - See additional Care Plan goals for specific interventions  Outcome: Progressing

## 2023-04-04 ENCOUNTER — APPOINTMENT (OUTPATIENT)
Dept: PICC SERVICES | Facility: HOSPITAL | Age: 73
End: 2023-04-04
Attending: INTERNAL MEDICINE
Payer: MEDICARE

## 2023-04-04 VITALS
OXYGEN SATURATION: 97 % | TEMPERATURE: 99 F | SYSTOLIC BLOOD PRESSURE: 118 MMHG | RESPIRATION RATE: 16 BRPM | BODY MASS INDEX: 23 KG/M2 | WEIGHT: 153.38 LBS | HEART RATE: 91 BPM | DIASTOLIC BLOOD PRESSURE: 65 MMHG

## 2023-04-04 LAB
ANION GAP SERPL CALC-SCNC: 6 MMOL/L (ref 0–18)
BUN BLD-MCNC: 16 MG/DL (ref 7–18)
BUN/CREAT SERPL: 51.6 (ref 10–20)
CALCIUM BLD-MCNC: 8.5 MG/DL (ref 8.5–10.1)
CHLORIDE SERPL-SCNC: 108 MMOL/L (ref 98–112)
CO2 SERPL-SCNC: 29 MMOL/L (ref 21–32)
CREAT BLD-MCNC: 0.31 MG/DL
DEPRECATED RDW RBC AUTO: 52.8 FL (ref 35.1–46.3)
ERYTHROCYTE [DISTWIDTH] IN BLOOD BY AUTOMATED COUNT: 16.4 % (ref 11–15)
GFR SERPLBLD BASED ON 1.73 SQ M-ARVRAT: 112 ML/MIN/1.73M2 (ref 60–?)
GLUCOSE BLD-MCNC: 115 MG/DL (ref 70–99)
GLUCOSE BLDC GLUCOMTR-MCNC: 102 MG/DL (ref 70–99)
GLUCOSE BLDC GLUCOMTR-MCNC: 112 MG/DL (ref 70–99)
GLUCOSE BLDC GLUCOMTR-MCNC: 163 MG/DL (ref 70–99)
HCT VFR BLD AUTO: 27.6 %
HGB BLD-MCNC: 8.4 G/DL
MCH RBC QN AUTO: 26.6 PG (ref 26–34)
MCHC RBC AUTO-ENTMCNC: 30.4 G/DL (ref 31–37)
MCV RBC AUTO: 87.3 FL
OSMOLALITY SERPL CALC.SUM OF ELEC: 298 MOSM/KG (ref 275–295)
PLATELET # BLD AUTO: 314 10(3)UL (ref 150–450)
POTASSIUM SERPL-SCNC: 3.8 MMOL/L (ref 3.5–5.1)
RBC # BLD AUTO: 3.16 X10(6)UL
SODIUM SERPL-SCNC: 143 MMOL/L (ref 136–145)
WBC # BLD AUTO: 5.2 X10(3) UL (ref 4–11)

## 2023-04-04 PROCEDURE — 85027 COMPLETE CBC AUTOMATED: CPT | Performed by: INTERNAL MEDICINE

## 2023-04-04 PROCEDURE — 80048 BASIC METABOLIC PNL TOTAL CA: CPT | Performed by: INTERNAL MEDICINE

## 2023-04-04 PROCEDURE — 82962 GLUCOSE BLOOD TEST: CPT

## 2023-04-04 PROCEDURE — C9113 INJ PANTOPRAZOLE SODIUM, VIA: HCPCS | Performed by: INTERNAL MEDICINE

## 2023-04-04 RX ORDER — VANCOMYCIN HYDROCHLORIDE 125 MG/1
125 CAPSULE ORAL DAILY
Qty: 9 CAPSULE | Refills: 0 | Status: SHIPPED | OUTPATIENT
Start: 2023-04-05 | End: 2023-04-14

## 2023-04-04 RX ORDER — PANTOPRAZOLE SODIUM 40 MG/1
40 TABLET, DELAYED RELEASE ORAL
Qty: 60 TABLET | Refills: 0 | Status: SHIPPED | OUTPATIENT
Start: 2023-04-04

## 2023-04-04 RX ORDER — PANTOPRAZOLE SODIUM 40 MG/1
40 TABLET, DELAYED RELEASE ORAL
Status: DISCONTINUED | OUTPATIENT
Start: 2023-04-04 | End: 2023-04-04

## 2023-04-04 NOTE — DISCHARGE PLANNING
I called and gave report to nurse Natalie Bell at [facility] facility. Patient's physical and history were relayed to nursing staff and included past medical history, admitting diagnosis of GI bleed and pneumonia, and plan to continue IV antibiotics at facility. Patient will be discharging at 1530 as arranged by social work/case management.     Patient's diet: continuing chronic tube feedings  Patient takes medication per PEG tube  Patient's mobility status is max assist

## 2023-04-04 NOTE — DISCHARGE SUMMARY
[facility] HOSP - [facility]    Discharge Summary    Dena Orta Patient Status:  Inpatient    1950 MRN [id number]   Location [facility] 3W/SW Attending Michelle Modi MD   Hosp Day # 3 PCP Clark Mcguire MD, Alfonso Perez MD     Date of Admission: 2023   Date of Discharge: 2023    Admitting Diagnosis: Aspiration pneumonitis ([address] [J69.0]  Upper GI bleed [K92.2]  Severe sepsis ([address] [A41.9, R65.20]    Disposition: Transfer to [facility]    Discharge Diagnosis: . Principal Problem:    Upper GI bleed  Active Problems:    Aspiration pneumonitis (HCC)    Severe sepsis [facility])      Hospital Course:   Reason for Admission: Coffee ground emesis    Discharge Physical Exam: General appearance:  alert, appears stated age and cooperative  Cardiovascular: S1, S2 normal, no murmur, click, rub or gallop, regular rate and rhythm, S1, S2 normal  Abdominal: soft, non-tender; bowel sounds normal; no masses,  no organomegaly  Extremities: extremities normal, atraumatic, no cyanosis or edema  Pulses: 2+ and symmetric  Skin: Skin color, texture, turgor normal. No rashes or lesions    Hospital Course: Pt seen by GI, H/H stable no further intervention, Started on meropenem and vanco per ID will DC to NH on vancomycin till 14 and meropenem till 3-45    Complications: None    Consultants       Provider   Role Specialty     Calvin Stafford MD  Consulting Physician Leah Galindo, Roseanne Araya MD  Consulting Physician INFECTIOUS DISEASES     Melany Franco MD  Consulting Physician Emergency Medicine          Pending Labs     Order Current Status    Blood Culture Preliminary result    Blood Culture Preliminary result          Discharge Plan:   Discharge Condition: Stable    Current Discharge Medication List    New Orders    sodium chloride 0.9% SOLN 100 mL with meropenem 500 MG SOLR 500 mg  Inject 500 mg into the vein every 8 (eight) hours for 3 days.     vancomycin 125 MG Oral Cap  Take 1 capsule (125 mg total) by mouth daily for 9 days. pantoprazole 40 MG Oral Tab EC  Take 1 tablet (40 mg total) by mouth 2 (two) times daily before meals. Home Meds - Unchanged    bisacodyl 10 MG Rectal Suppos  Place 1 suppository (10 mg total) rectally every third day as needed (constipation). sennosides 8.8 MG/5ML Oral Syrup  Take 5 mL (8.8 mg total) by mouth nightly. atorvastatin 40 MG Oral Tab  Take 1 tablet (40 mg total) by mouth nightly. docusate sodium 50 MG/5ML Oral Liquid  Take 10 mL (100 mg total) by mouth 2 (two) times daily. magnesium oxide 400 MG Oral Tab  Take 1 tablet (400 mg total) by mouth daily. ergocalciferol 84737 units Oral Cap  Take 5,000 Units by mouth once a week. ferrous sulfate 325 (65 FE) MG Oral Tab EC  Take 1 tablet (325 mg total) by mouth daily. Multiple Vitamin (MULTI-DAY VITAMINS) Oral Tab  Take by mouth daily. phenytoin 125 MG/5ML Oral Suspension  Take 8 mL (200 mg total) by mouth 2 (two) times daily. acetaminophen 325 MG Oral Tab  Take 2 tablets (650 mg total) by mouth 2 (two) times daily as needed for Pain. Atropine Sulfate 1 % Ophthalmic Solution  1-2 drops SUBLINGUAL- every hour when necessary for excessive secretions    baclofen 10 MG Oral Tab  Take 10 mg by mouth 3 (three) times daily as needed. metoprolol tartrate 25 MG Oral Tab  Take 50 mg by mouth 2 (two) times daily. Hold SBP < 100 or heart rate < 60    collagenase (SANTYL) 250 UNIT/GM External Ointment  Apply 1 Application topically 2 (two) times a day. To sacral wound    PEG 3350 17 g Oral Powd Pack  Take 17 g by mouth daily. Calcium 500-125 MG-UNIT Oral Tab  Take by mouth 2 (two) times a day. Discharge Diet: As tolerated    Discharge Activity: As tolerated    Follow up:                Dusty Garrett MD, Zach Kennedy MD  4/4/2023

## 2023-04-04 NOTE — CM/SW NOTE
04/04/23 1300   Discharge disposition   Expected discharge disposition Home or [facility] Provider [facility] in   Discharge transportation [address]     Per Chart review pt is a LTC pt at The [address] [place] [address] with Elder liaison. Pt can return today with new IV site and finish course of ABX at [facility]. RN arranging new PIV with PICC rn.     RN to call report to Olive Flood at [phone]. Rapid Covid needed, Order entered, RN aware to collect prior to dc. Plan: Return to [facility] today at 330pm via [address]. PCS done. Keli Side updated with dc time via telephone call. Joy Damico.  Mena Gutierrez RN, BSN  Nurse   [phone]

## 2023-04-04 NOTE — PLAN OF CARE
Pt. Maksim Alvarez. G-tube feeding changed, going at 60 ml/hr. Incontinent care provided and repositioned.    Problem: RESPIRATORY - ADULT  Goal: Achieves optimal ventilation and oxygenation  Description: INTERVENTIONS:  - Assess for changes in respiratory status  - Assess for changes in mentation and behavior  - Position to facilitate oxygenation and minimize respiratory effort  - Oxygen supplementation based on oxygen saturation or ABGs  - Provide Smoking Cessation handout, if applicable  - Encourage broncho-pulmonary hygiene including cough, deep breathe, Incentive Spirometry  - Assess the need for suctioning and perform as needed  - Assess and instruct to report SOB or any respiratory difficulty  - Respiratory Therapy support as indicated  - Manage/alleviate anxiety  - Monitor for signs/symptoms of CO2 retention  Outcome: Progressing     Problem: PAIN - ADULT  Goal: Verbalizes/displays adequate comfort level or patient's stated pain goal  Description: INTERVENTIONS:  - Encourage pt to monitor pain and request assistance  - Assess pain using appropriate pain scale  - Administer analgesics based on type and severity of pain and evaluate response  - Implement non-pharmacological measures as appropriate and evaluate response  - Consider cultural and social influences on pain and pain management  - Manage/alleviate anxiety  - Utilize distraction and/or relaxation techniques  - Monitor for opioid side effects  - Notify MD/LIP if interventions unsuccessful or patient reports new pain  - Anticipate increased pain with activity and pre-medicate as appropriate  Outcome: Progressing     Problem: RISK FOR INFECTION - ADULT  Goal: Absence of fever/infection during anticipated neutropenic period  Description: INTERVENTIONS  - Monitor WBC  - Administer growth factors as ordered  - Implement neutropenic guidelines  Outcome: Progressing     Problem: Patient Centered Care  Goal: Patient preferences are identified and integrated in the patient's plan of care  Description: Interventions:  - What would you like us to know as we care for you? From the [facility]  - Provide timely, complete, and accurate information to patient/family  - Incorporate patient and family knowledge, values, beliefs, and cultural backgrounds into the planning and delivery of care  - Encourage patient/family to participate in care and decision-making at the level they choose  - Honor patient and family perspectives and choices  Outcome: Progressing     Problem: Patient/Family Goals  Goal: Patient/Family Long Term Goal  Description: Patient's Long Term Goal: To go back to [facility]    Interventions:  - Follow medical regimen  - See additional Care Plan goals for specific interventions  Outcome: Progressing  Goal: Patient/Family Short Term Goal  Description: Patient's Short Term Goal: To have stable Hmgb    Interventions:   -Follow medical regimen  - See additional Care Plan goals for specific interventions  Outcome: Progressing

## 2023-04-04 NOTE — PLAN OF CARE
Patient medically cleared to be discharged back to the [facility]. This RN called Heather Blackwood to give update. Rapid Covid swab sent down to lab 3x, all invalid, per TL and SW, OK to transfer pt. PICC RN placed PIV for abx for next 3 days.      Problem: RESPIRATORY - ADULT  Goal: Achieves optimal ventilation and oxygenation  Description: INTERVENTIONS:  - Assess for changes in respiratory status  - Assess for changes in mentation and behavior  - Position to facilitate oxygenation and minimize respiratory effort  - Oxygen supplementation based on oxygen saturation or ABGs  - Provide Smoking Cessation handout, if applicable  - Encourage broncho-pulmonary hygiene including cough, deep breathe, Incentive Spirometry  - Assess the need for suctioning and perform as needed  - Assess and instruct to report SOB or any respiratory difficulty  - Respiratory Therapy support as indicated  - Manage/alleviate anxiety  - Monitor for signs/symptoms of CO2 retention  Outcome: Progressing     Problem: PAIN - ADULT  Goal: Verbalizes/displays adequate comfort level or patient's stated pain goal  Description: INTERVENTIONS:  - Encourage pt to monitor pain and request assistance  - Assess pain using appropriate pain scale  - Administer analgesics based on type and severity of pain and evaluate response  - Implement non-pharmacological measures as appropriate and evaluate response  - Consider cultural and social influences on pain and pain management  - Manage/alleviate anxiety  - Utilize distraction and/or relaxation techniques  - Monitor for opioid side effects  - Notify MD/LIP if interventions unsuccessful or patient reports new pain  - Anticipate increased pain with activity and pre-medicate as appropriate  Outcome: Progressing     Problem: RISK FOR INFECTION - ADULT  Goal: Absence of fever/infection during anticipated neutropenic period  Description: INTERVENTIONS  - Monitor WBC  - Administer growth factors as ordered  - Implement neutropenic guidelines  Outcome: Progressing     Problem: Patient Centered Care  Goal: Patient preferences are identified and integrated in the patient's plan of care  Description: Interventions:  - What would you like us to know as we care for you? From the [facility]  - Provide timely, complete, and accurate information to patient/family  - Incorporate patient and family knowledge, values, beliefs, and cultural backgrounds into the planning and delivery of care  - Encourage patient/family to participate in care and decision-making at the level they choose  - Honor patient and family perspectives and choices  Outcome: Progressing     Problem: Patient/Family Goals  Goal: Patient/Family Long Term Goal  Description: Patient's Long Term Goal: To go back to [facility]    Interventions:  - Follow medical regimen  - See additional Care Plan goals for specific interventions  Outcome: Progressing  Goal: Patient/Family Short Term Goal  Description: Patient's Short Term Goal: To have stable Hmgb    Interventions:   -Follow medical regimen  - See additional Care Plan goals for specific interventions  Outcome: Progressing

## 2023-04-05 NOTE — PAYOR COMM NOTE
--------------  DISCHARGE REVIEW    Payor: Mario Rae [place] #:  [id number]  Authorization Number: [id number]    Admit date: 23  Admit time:   1:23 PM  Discharge Date: 2023  4:50 PM     Admitting Physician: Lon Kelley MD  Attending Physician:  No att. providers found  Primary Care Physician: Lon Kelley MD          Discharge Summary Notes      Discharge Summary signed by Lon Kelley MD at 2023 12:00 PM     Author: Lon Kelley MD Specialty: Internal Medicine Author Type: Physician    Filed: 2023 12:00 PM Date of Service: 2023 11:58 AM Status: Signed    : Lon Kelley MD (Physician)         [facility]    Discharge Summary    Fay Matos Patient Status:  Inpatient    1950 MRN [id number]   Location [facility] 3W/SW Attending Lon Kelley MD   Hosp Day # 3 PCP Estefani Quezada MD, Rubén Birmingham MD     Date of Admission: 2023   Date of Discharge: 2023    Admitting Diagnosis: Aspiration pneumonitis ([address] [J69.0]  Upper GI bleed [K92.2]  Severe sepsis ([address] [A41.9, R65.20]    Disposition: Transfer to [facility]    Discharge Diagnosis: . Principal Problem:    Upper GI bleed  Active Problems:    Aspiration pneumonitis (HCC)    Severe sepsis [facility])      Hospital Course:   Reason for Admission: Coffee ground emesis    Discharge Physical Exam: General appearance:  alert, appears stated age and cooperative  Cardiovascular: S1, S2 normal, no murmur, click, rub or gallop, regular rate and rhythm, S1, S2 normal  Abdominal: soft, non-tender; bowel sounds normal; no masses,  no organomegaly  Extremities: extremities normal, atraumatic, no cyanosis or edema  Pulses: 2+ and symmetric  Skin: Skin color, texture, turgor normal. No rashes or lesions    Hospital Course: Pt seen by GI, H/H stable no further intervention, Started on meropenem and vanco per ID will DC to NH on vancomycin till 4-14 and meropenem till 4-7-69    Complications: None    Consultants       Provider   Role Specialty     Richy Sen MD  Consulting Physician Mina Evans MD  Consulting Physician INFECTIOUS DISEASES     Alf Escalera MD  Consulting Physician Emergency Medicine          Pending Labs     Order Current Status    Blood Culture Preliminary result    Blood Culture Preliminary result          Discharge Plan:   Discharge Condition: Stable    Current Discharge Medication List    New Orders    sodium chloride 0.9% SOLN 100 mL with meropenem 500 MG SOLR 500 mg  Inject 500 mg into the vein every 8 (eight) hours for 3 days. vancomycin 125 MG Oral Cap  Take 1 capsule (125 mg total) by mouth daily for 9 days. pantoprazole 40 MG Oral Tab EC  Take 1 tablet (40 mg total) by mouth 2 (two) times daily before meals. Home Meds - Unchanged    bisacodyl 10 MG Rectal Suppos  Place 1 suppository (10 mg total) rectally every third day as needed (constipation). sennosides 8.8 MG/5ML Oral Syrup  Take 5 mL (8.8 mg total) by mouth nightly. atorvastatin 40 MG Oral Tab  Take 1 tablet (40 mg total) by mouth nightly. docusate sodium 50 MG/5ML Oral Liquid  Take 10 mL (100 mg total) by mouth 2 (two) times daily. magnesium oxide 400 MG Oral Tab  Take 1 tablet (400 mg total) by mouth daily. ergocalciferol 43043 units Oral Cap  Take 5,000 Units by mouth once a week. ferrous sulfate 325 (65 FE) MG Oral Tab EC  Take 1 tablet (325 mg total) by mouth daily. Multiple Vitamin (MULTI-DAY VITAMINS) Oral Tab  Take by mouth daily. phenytoin 125 MG/5ML Oral Suspension  Take 8 mL (200 mg total) by mouth 2 (two) times daily. acetaminophen 325 MG Oral Tab  Take 2 tablets (650 mg total) by mouth 2 (two) times daily as needed for Pain.     Atropine Sulfate 1 % Ophthalmic Solution  1-2 drops SUBLINGUAL- every hour when necessary for excessive secretions    baclofen 10 MG Oral Tab  Take 10 mg by mouth 3 (three) times daily as needed. metoprolol tartrate 25 MG Oral Tab  Take 50 mg by mouth 2 (two) times daily. Hold SBP < 100 or heart rate < 60    collagenase (SANTYL) 250 UNIT/GM External Ointment  Apply 1 Application topically 2 (two) times a day. To sacral wound    PEG 3350 17 g Oral Powd Pack  Take 17 g by mouth daily. Calcium 500-125 MG-UNIT Oral Tab  Take by mouth 2 (two) times a day. Discharge Diet: As tolerated    Discharge Activity: As tolerated    Follow up:                Winston Wharton MD, William Jc MD  4/4/2023    Electronically signed by Simona Rodriguez MD on 4/4/2023 12:00 PM         REVIEWER COMMENTS

## 2023-06-11 ENCOUNTER — LAB REQUISITION (OUTPATIENT)
Dept: LAB | Age: 73
End: 2023-06-11

## 2023-06-11 LAB
ALBUMIN SERPL-MCNC: 2 G/DL (ref 3.6–5.1)
ALBUMIN/GLOB SERPL: 0.5 {RATIO} (ref 1–2.4)
ALP SERPL-CCNC: 113 UNITS/L (ref 45–117)
ALT SERPL-CCNC: 15 UNITS/L
ANION GAP SERPL CALC-SCNC: 5 MMOL/L (ref 7–19)
AST SERPL-CCNC: 24 UNITS/L
BASOPHILS # BLD: 0 K/MCL (ref 0–0.3)
BASOPHILS NFR BLD: 0 %
BILIRUB SERPL-MCNC: 0.2 MG/DL (ref 0.2–1)
BUN SERPL-MCNC: 39 MG/DL (ref 6–20)
BUN/CREAT SERPL: 89 (ref 7–25)
CALCIUM SERPL-MCNC: 8.5 MG/DL (ref 8.4–10.2)
CHLORIDE SERPL-SCNC: 111 MMOL/L (ref 97–110)
CO2 SERPL-SCNC: 32 MMOL/L (ref 21–32)
CREAT SERPL-MCNC: 0.44 MG/DL (ref 0.51–0.95)
DEPRECATED RDW RBC: 59.8 FL (ref 39–50)
EOSINOPHIL # BLD: 0.1 K/MCL (ref 0–0.5)
EOSINOPHIL NFR BLD: 1 %
ERYTHROCYTE [DISTWIDTH] IN BLOOD: 18.7 % (ref 11–15)
FASTING DURATION TIME PATIENT: ABNORMAL H
GFR SERPLBLD BASED ON 1.73 SQ M-ARVRAT: >90 ML/MIN
GLOBULIN SER-MCNC: 3.7 G/DL (ref 2–4)
GLUCOSE SERPL-MCNC: 88 MG/DL (ref 70–99)
HCT VFR BLD CALC: 26.4 % (ref 36–46.5)
HGB BLD-MCNC: 8 G/DL (ref 12–15.5)
IMM GRANULOCYTES # BLD AUTO: 0 K/MCL (ref 0–0.2)
IMM GRANULOCYTES # BLD: 0 %
LYMPHOCYTES # BLD: 1.2 K/MCL (ref 1–4)
LYMPHOCYTES NFR BLD: 15 %
MCH RBC QN AUTO: 26.4 PG (ref 26–34)
MCHC RBC AUTO-ENTMCNC: 30.3 G/DL (ref 32–36.5)
MCV RBC AUTO: 87.1 FL (ref 78–100)
MONOCYTES # BLD: 0.8 K/MCL (ref 0.3–0.9)
MONOCYTES NFR BLD: 10 %
NEUTROPHILS # BLD: 6.3 K/MCL (ref 1.8–7.7)
NEUTROPHILS NFR BLD: 74 %
NRBC BLD MANUAL-RTO: 0 /100 WBC
PLATELET # BLD AUTO: 273 K/MCL (ref 140–450)
POTASSIUM SERPL-SCNC: 4.2 MMOL/L (ref 3.4–5.1)
PROT SERPL-MCNC: 5.7 G/DL (ref 6.4–8.2)
RBC # BLD: 3.03 MIL/MCL (ref 4–5.2)
SODIUM SERPL-SCNC: 144 MMOL/L (ref 135–145)
WBC # BLD: 8.5 K/MCL (ref 4.2–11)

## 2023-06-11 PROCEDURE — PSEU8250 COMPREHENSIVE METABOLIC PANEL: Performed by: CLINICAL MEDICAL LABORATORY

## 2023-06-11 PROCEDURE — 85025 COMPLETE CBC W/AUTO DIFF WBC: CPT | Performed by: CLINICAL MEDICAL LABORATORY

## 2023-06-11 PROCEDURE — 80053 COMPREHEN METABOLIC PANEL: CPT | Performed by: CLINICAL MEDICAL LABORATORY

## 2023-07-09 ENCOUNTER — HOSPITAL ENCOUNTER (INPATIENT)
Facility: HOSPITAL | Age: 73
LOS: 3 days | Discharge: NURSING, REHAB OR LONG-TERM CARE | End: 2023-07-12
Attending: STUDENT IN AN ORGANIZED HEALTH CARE EDUCATION/TRAINING PROGRAM | Admitting: INTERNAL MEDICINE
Payer: MEDICARE

## 2023-07-09 ENCOUNTER — APPOINTMENT (OUTPATIENT)
Dept: GENERAL RADIOLOGY | Facility: HOSPITAL | Age: 73
End: 2023-07-09
Payer: MEDICARE

## 2023-07-09 PROBLEM — J96.21 ACUTE ON CHRONIC RESPIRATORY FAILURE WITH HYPOXIA (HCC): Status: ACTIVE | Noted: 2023-07-09

## 2023-07-09 PROBLEM — D64.9 ANEMIA, UNSPECIFIED TYPE: Status: ACTIVE | Noted: 2023-07-09

## 2023-07-09 PROBLEM — J69.0 ASPIRATION PNEUMONIA OF RIGHT UPPER LOBE, UNSPECIFIED ASPIRATION PNEUMONIA TYPE (HCC): Status: ACTIVE | Noted: 2023-07-09

## 2023-07-09 PROBLEM — K92.2 UGIB (UPPER GASTROINTESTINAL BLEED): Status: ACTIVE | Noted: 2023-01-01

## 2023-07-09 PROBLEM — K92.2 UGIB (UPPER GASTROINTESTINAL BLEED): Status: ACTIVE | Noted: 2023-07-09

## 2023-07-09 PROBLEM — J96.21 ACUTE ON CHRONIC RESPIRATORY FAILURE WITH HYPOXIA (HCC): Status: ACTIVE | Noted: 2023-01-01

## 2023-07-09 PROBLEM — D64.9 ANEMIA, UNSPECIFIED TYPE: Status: ACTIVE | Noted: 2023-01-01

## 2023-07-09 PROBLEM — J69.0 ASPIRATION PNEUMONIA OF RIGHT UPPER LOBE, UNSPECIFIED ASPIRATION PNEUMONIA TYPE (HCC): Status: ACTIVE | Noted: 2023-01-01

## 2023-07-09 NOTE — ED INITIAL ASSESSMENT (HPI)
Patient to ed via ems, patient from [facility], nh staff found patient to be hypoxic, hypotensive, and black, ground coffee emesis x this am.    Patient arrives to ed on nrb. Normaly on 5L nc.

## 2023-07-09 NOTE — PLAN OF CARE
Received pt HR 140s;150s and tachypneic on BIPAP 70% upon arrival to unit. 1UPRBC given; repeat Hgb drawn; see results repeat LA drawn; see results Pt O2 weaned throughout shift. Pt on HFNC 6 currently. Guardian came to bedside; updated on plan of care. NG remains to LIS; coffee ground appearance. Chronic garza remains in;  Pt in bed; lowest position; call light remains within reach. Problem: RESPIRATORY - ADULT  Goal: Achieves optimal ventilation and oxygenation  Description: INTERVENTIONS:  - Assess for changes in respiratory status  - Assess for changes in mentation and behavior  - Position to facilitate oxygenation and minimize respiratory effort  - Oxygen supplementation based on oxygen saturation or ABGs  - Provide Smoking Cessation handout, if applicable  - Encourage broncho-pulmonary hygiene including cough, deep breathe, Incentive Spirometry  - Assess the need for suctioning and perform as needed  - Assess and instruct to report SOB or any respiratory difficulty  - Respiratory Therapy support as indicated  - Manage/alleviate anxiety  - Monitor for signs/symptoms of CO2 retention  Outcome: Progressing     Problem: CARDIOVASCULAR - ADULT  Goal: Maintains optimal cardiac output and hemodynamic stability  Description: INTERVENTIONS:  - Monitor vital signs, rhythm, and trends  - Monitor for bleeding, hypotension and signs of decreased cardiac output  - Evaluate effectiveness of vasoactive medications to optimize hemodynamic stability  - Monitor arterial and/or venous puncture sites for bleeding and/or hematoma  - Assess quality of pulses, skin color and temperature  - Assess for signs of decreased coronary artery perfusion - ex.  Angina  - Evaluate fluid balance, assess for edema, trend weights  Outcome: Progressing  Goal: Absence of cardiac arrhythmias or at baseline  Description: INTERVENTIONS:  - Continuous cardiac monitoring, monitor vital signs, obtain 12 lead EKG if indicated  - Evaluate effectiveness of antiarrhythmic and heart rate control medications as ordered  - Initiate emergency measures for life threatening arrhythmias  - Monitor electrolytes and administer replacement therapy as ordered  Outcome: Progressing

## 2023-07-09 NOTE — ED QUICK NOTES
Orders for admission, patient is aware of plan and ready to go upstairs. Any questions, please call ED  [facility]  at extension [phone].    Patient presents with:  GI Bleeding  Difficulty Breathing  Hypotension      Patient AO x CARYN, AOX0  Ambulation: NON AMBULATORY  Belongings: ACCOMAPNYING PATIENT  Medications: SEE AMR   IV: LEFT ARM PICC, RIGHT ARM 20G US GUIDED   Language: ENGLISH  COVID-19 suspicion level/status: NA  CIWA SCORE: NA  NIH: NA  Other pertinent information:  NA

## 2023-07-10 ENCOUNTER — APPOINTMENT (OUTPATIENT)
Dept: GENERAL RADIOLOGY | Facility: HOSPITAL | Age: 73
End: 2023-07-10
Attending: INTERNAL MEDICINE
Payer: MEDICARE

## 2023-07-10 PROBLEM — Z71.89 ADVANCE CARE PLANNING: Status: ACTIVE | Noted: 2023-07-10

## 2023-07-10 PROBLEM — Z71.89 GOALS OF CARE, COUNSELING/DISCUSSION: Status: ACTIVE | Noted: 2023-07-10

## 2023-07-10 PROBLEM — Z71.89 ADVANCE CARE PLANNING: Status: ACTIVE | Noted: 2023-01-01

## 2023-07-10 PROBLEM — Z51.5 PALLIATIVE CARE BY SPECIALIST: Status: ACTIVE | Noted: 2023-01-01

## 2023-07-10 PROBLEM — Z71.89 GOALS OF CARE, COUNSELING/DISCUSSION: Status: ACTIVE | Noted: 2023-01-01

## 2023-07-10 PROBLEM — Z51.5 PALLIATIVE CARE BY SPECIALIST: Status: ACTIVE | Noted: 2023-07-10

## 2023-07-10 NOTE — PROGRESS NOTES
[facility]    CDI Prediction Tool Protocol (Vancomycin Initiated)    OVP (oral vancomycin prophylaxis) 125 mg PO Daily is being started in this patient based on a score of 22.1. Score Breakdown:  History of CDI within 1 year AND high risk antibiotic use (13 points)  High risk antibiotic use (5 points)  Age 77 - 78 years (2 points)  Long term care facility resident (1 point)  Hypoalbuminemia: < 3g/dL (1 point)  History of CDI regardless of high risk antibiotic use (0.1 point)    This patient is currently at high risk for developing CDI due to his/her score being >/=13 points and is being started on prophylactic oral vancomycin to prevent Cdiff. This patient does not have C. difficile infection. All measures taken within this protocol are to decrease the risk of CDI development.     Ivonne Roberts, PharmD  7/10/2023  7:45 AM  [facility]  Pharmacy Extension: [phone]

## 2023-07-10 NOTE — CM/SW NOTE
Department  notified of request for lamine HANNA referrals started. Assigned CM/SW to follow up with pt/family on further discharge planning.       Linh Dunlap  [address] [place] [address]

## 2023-07-10 NOTE — PLAN OF CARE
Problem: ALTERED NUTRIENT INTAKE - ADULT  Goal: Nutrient intake appropriate for improving, restoring or maintaining nutritional needs  Description: INTERVENTIONS:  - Assess nutritional status and recommend course of action  - Monitor oral intake, labs, and treatment plans  - Recommend appropriate diets, oral nutritional supplements, and vitamin/mineral supplements  - Recommend, monitor, and adjust tube feedings and TPN/PPN based on assessed needs  - Provide specific nutrition education as appropriate  7/10/2023 1545 by Nasreen Salomon RD  Outcome: Not Progressing  7/10/2023 1543 by Nasreen Salomon RD  Outcome: Progressing

## 2023-07-10 NOTE — PLAN OF CARE
Pt is alert and oriented x0. Currently on 6L HFNC. NG to LIS, coffee ground, brown color. Chronic garza in place. Patient bathed, garza care done. Bed in lowest and locked position. Call light within reach, frequent rounds made           Problem: RESPIRATORY - ADULT  Goal: Achieves optimal ventilation and oxygenation  Description: INTERVENTIONS:  - Assess for changes in respiratory status  - Assess for changes in mentation and behavior  - Position to facilitate oxygenation and minimize respiratory effort  - Oxygen supplementation based on oxygen saturation or ABGs  - Provide Smoking Cessation handout, if applicable  - Encourage broncho-pulmonary hygiene including cough, deep breathe, Incentive Spirometry  - Assess the need for suctioning and perform as needed  - Assess and instruct to report SOB or any respiratory difficulty  - Respiratory Therapy support as indicated  - Manage/alleviate anxiety  - Monitor for signs/symptoms of CO2 retention  7/10/2023 0240 by Rula Sadler RN  Outcome: Progressing  7/10/2023 0218 by Rula Sadler RN  Outcome: Progressing  7/10/2023 0216 by Rula Sadler RN  Outcome: Progressing  7/10/2023 0214 by Rula Sadler RN  Outcome: Progressing     Problem: CARDIOVASCULAR - ADULT  Goal: Maintains optimal cardiac output and hemodynamic stability  Description: INTERVENTIONS:  - Monitor vital signs, rhythm, and trends  - Monitor for bleeding, hypotension and signs of decreased cardiac output  - Evaluate effectiveness of vasoactive medications to optimize hemodynamic stability  - Monitor arterial and/or venous puncture sites for bleeding and/or hematoma  - Assess quality of pulses, skin color and temperature  - Assess for signs of decreased coronary artery perfusion - ex.  Angina  - Evaluate fluid balance, assess for edema, trend weights  7/10/2023 0240 by Rula Sadler RN  Outcome: Progressing  7/10/2023 0218 by Rula Sadler RN  Outcome: Progressing  7/10/2023 0216 by Rula Sadler RN  Outcome: Progressing  7/10/2023 0214 by Betsy Pyle RN  Outcome: Progressing  Goal: Absence of cardiac arrhythmias or at baseline  Description: INTERVENTIONS:  - Continuous cardiac monitoring, monitor vital signs, obtain 12 lead EKG if indicated  - Evaluate effectiveness of antiarrhythmic and heart rate control medications as ordered  - Initiate emergency measures for life threatening arrhythmias  - Monitor electrolytes and administer replacement therapy as ordered  7/10/2023 0240 by Betsy Pyle RN  Outcome: Progressing  7/10/2023 0218 by Betsy Pyle RN  Outcome: Progressing  7/10/2023 0216 by Betsy Pyle RN  Outcome: Progressing  7/10/2023 0214 by Betsy Pyle RN  Outcome: Progressing     Problem: SAFETY ADULT - FALL  Goal: Free from fall injury  Description: INTERVENTIONS:  - Assess pt frequently for physical needs  - Identify cognitive and physical deficits and behaviors that affect risk of falls.   - [place] fall precautions as indicated by assessment.  - Educate pt/family on patient safety including physical limitations  - Instruct pt to call for assistance with activity based on assessment  - Modify environment to reduce risk of injury  - Provide assistive devices as appropriate  - Consider OT/PT consult to assist with strengthening/mobility  - Encourage toileting schedule  7/10/2023 0240 by Betsy Pyle RN  Outcome: Progressing  7/10/2023 0218 by Betsy Pyle RN  Outcome: Progressing  7/10/2023 0216 by Betsy Pyle RN  Outcome: Progressing     Problem: PAIN - ADULT  Goal: Verbalizes/displays adequate comfort level or patient's stated pain goal  Description: INTERVENTIONS:  - Encourage pt to monitor pain and request assistance  - Assess pain using appropriate pain scale  - Administer analgesics based on type and severity of pain and evaluate response  - Implement non-pharmacological measures as appropriate and evaluate response  - Consider cultural and social influences on pain and pain management  - Manage/alleviate anxiety  - Utilize distraction and/or relaxation techniques  - Monitor for opioid side effects  - Notify MD/LIP if interventions unsuccessful or patient reports new pain  - Anticipate increased pain with activity and pre-medicate as appropriate  Outcome: Progressing     Problem: Patient/Family Goals  Goal: Patient/Family Long Term Goal  Description: Patient's Long Term Goal:     Interventions:  -   - See additional Care Plan goals for specific interventions  Outcome: Progressing  Goal: Patient/Family Short Term Goal  Description: Patient's Short Term Goal:     Interventions:   -   - See additional Care Plan goals for specific interventions  Outcome: Progressing

## 2023-07-10 NOTE — PLAN OF CARE
Pt alert, oriented x0. Baseline non-verbal, on 3L NC. NG in place to LIS. NPO. 1Unit of PRBC's given. Antibiotics administered per order. Fluids increased, adequate urine output. No BM. Problem: RESPIRATORY - ADULT  Goal: Achieves optimal ventilation and oxygenation  Description: INTERVENTIONS:  - Assess for changes in respiratory status  - Assess for changes in mentation and behavior  - Position to facilitate oxygenation and minimize respiratory effort  - Oxygen supplementation based on oxygen saturation or ABGs  - Provide Smoking Cessation handout, if applicable  - Encourage broncho-pulmonary hygiene including cough, deep breathe, Incentive Spirometry  - Assess the need for suctioning and perform as needed  - Assess and instruct to report SOB or any respiratory difficulty  - Respiratory Therapy support as indicated  - Manage/alleviate anxiety  - Monitor for signs/symptoms of CO2 retention  Outcome: Progressing     Problem: CARDIOVASCULAR - ADULT  Goal: Maintains optimal cardiac output and hemodynamic stability  Description: INTERVENTIONS:  - Monitor vital signs, rhythm, and trends  - Monitor for bleeding, hypotension and signs of decreased cardiac output  - Evaluate effectiveness of vasoactive medications to optimize hemodynamic stability  - Monitor arterial and/or venous puncture sites for bleeding and/or hematoma  - Assess quality of pulses, skin color and temperature  - Assess for signs of decreased coronary artery perfusion - ex.  Angina  - Evaluate fluid balance, assess for edema, trend weights  Outcome: Progressing  Goal: Absence of cardiac arrhythmias or at baseline  Description: INTERVENTIONS:  - Continuous cardiac monitoring, monitor vital signs, obtain 12 lead EKG if indicated  - Evaluate effectiveness of antiarrhythmic and heart rate control medications as ordered  - Initiate emergency measures for life threatening arrhythmias  - Monitor electrolytes and administer replacement therapy as ordered  Outcome: Progressing     Problem: SAFETY ADULT - FALL  Goal: Free from fall injury  Description: INTERVENTIONS:  - Assess pt frequently for physical needs  - Identify cognitive and physical deficits and behaviors that affect risk of falls.   - [place] fall precautions as indicated by assessment.  - Educate pt/family on patient safety including physical limitations  - Instruct pt to call for assistance with activity based on assessment  - Modify environment to reduce risk of injury  - Provide assistive devices as appropriate  - Consider OT/PT consult to assist with strengthening/mobility  - Encourage toileting schedule  Outcome: Progressing     Problem: PAIN - ADULT  Goal: Verbalizes/displays adequate comfort level or patient's stated pain goal  Description: INTERVENTIONS:  - Encourage pt to monitor pain and request assistance  - Assess pain using appropriate pain scale  - Administer analgesics based on type and severity of pain and evaluate response  - Implement non-pharmacological measures as appropriate and evaluate response  - Consider cultural and social influences on pain and pain management  - Manage/alleviate anxiety  - Utilize distraction and/or relaxation techniques  - Monitor for opioid side effects  - Notify MD/LIP if interventions unsuccessful or patient reports new pain  - Anticipate increased pain with activity and pre-medicate as appropriate  Outcome: Progressing     Problem: Patient/Family Goals  Goal: Patient/Family Long Term Goal  Description: Patient's Long Term Goal:     Interventions:  -   - See additional Care Plan goals for specific interventions  Outcome: Progressing  Goal: Patient/Family Short Term Goal  Description: Patient's Short Term Goal:     Interventions:   -   - See additional Care Plan goals for specific interventions  Outcome: Progressing

## 2023-07-10 NOTE — CDS QUERY
Etiology is unknown but the patient has just completed a 5-day course of Macrobid for UTI but repeated urinalysis is negative. Continue IV hydration at 75 mL/h and monitor.     Negative CT head, chest Xray, and KUB     Potential for Impaired Tissue Perfusion  CLINICAL DOCUMENTATION CLARIFICATION FORM    Dear Doctor Davie Acuna:  Clinical information (provided below) suggests Potential for Impaired Tissue Perfusion. For accurate ICD-10-CM code assignment to reflect severity of illness and risk of mortality,  PLEASE (X) ALL DIAGNOSES THAT APPLY. SELECTION BY PROVIDER ONLY  (  ) Hemorrhagic Shock  (  x) Hypovolemic Shock  (  ) Other (please specify):   (  ) Unable to Determine (please comment)     Clinical Indicators:  Hgb 7.0/ Hct 22.7  Acute on chronic respiratory failure with hypoxia  Hypotension  Shock w/o hypotension and suspect with elevated lactate       Risk Factors:  Possible Sepsis ( 7/10 Pulmonology PN)  Acute Blood loss- Hgb down to 7-coffee ground emesis  GI Bleed    Treatments  Transfused 1 unit PRBCs  Sepsis Protocol  IVF   Empiric iv abx                 If you have any questions, please contact Clinical : Savannah Summers RN CDS  at [email or web address]  Thank You!     THIS FORM IS A PERMANENT PART OF THE MEDICAL RECORD

## 2023-07-10 NOTE — PROGRESS NOTES
GASTROENTEROLOGY PROGRESS NOTE      Subjective: Pt nonverbal, non responsive. Coffee grounds still being suctioned through NG tube, no gross amounts. No overt GI bleeding overnight. PE:  Vitals: BP Readings from Last 3 Encounters:  07/10/23 : 105/56  23 : 118/65  10/19/22 : 120/80   Wt Readings from Last 3 Encounters:  23 : 158 lb 8.2 oz (71.9 kg)  23 : 153 lb 6.4 oz (69.6 kg)  10/19/22 : 161 lb 14.4 oz (73.4 kg)     General: lethargcic, altered mental status in NAD  HEENT: No scleral icterus, no LAD  Abdomen: normal active bowel sounds, soft, nontender, nondistended,PEG site with lightly loosened bumper and dried coffee grounds around edge, no leakage    Labs: Reviewed in chart   Latest Reference Range & Units 23 10:02 23 14:26 07/10/23 04:44   WBC 4.0 - 11.0 x10(3) uL 12.6 (H)  13.6 (H)   Hemoglobin 12.0 - 16.0 g/dL 6.8 (LL) 7.7 (L) 7.0 (L)   Hematocrit 35.0 - 48.0 % 24.1 (L)  22.7 (L)   Platelet Count 745.9 - 450.0 10(3)uL 415.0  372.0     Imagin23 CT A/P:  Diffuse basilar pulmonary opacities most likely from large volume aspiration. Thickened lower esophagus suggesting esophagitis. Moderate diffuse colonic stool. No obstruction or perforation. Well-positioned G-tube. Possible pyelonephritis superior pole right kidney    Assessment and Plan: Patient is a 70 yo female with history of traumatic brain injury, s/p PEG placement, admitted with hypoxia, hypotension and coffee ground emesis and aspiration PNA. High risk for endoscopic evaluation at this time and discussion was had by Dr. Bonny [facility] and POA who is electing to hold off on EGD at this time. Recommend IV PPI BID, transfuse as needed and continue supportive care.

## 2023-07-11 ENCOUNTER — APPOINTMENT (OUTPATIENT)
Dept: GENERAL RADIOLOGY | Facility: HOSPITAL | Age: 73
End: 2023-07-11
Attending: INTERNAL MEDICINE
Payer: MEDICARE

## 2023-07-11 NOTE — CM/SW NOTE
CM spoke with Karine Rodrigues (POA). Alycia (palliative) will be meeting with family tomorrow. Plan at this time is to return to the [facility] with Karine Rodrigues. PLAN:  Return to the [place] [id number] once medically cleared. / to remain available for support and/or discharge planning.       Lonzie Sandhoff MBA BSN RN [address]  RN Case Manager  [phone]

## 2023-07-11 NOTE — PROGRESS NOTES
07/11/23 1146   Clinical Encounter Type   Visited With Patient; Health care provider   Routine Visit Follow-up   Crisis Visit Critical care   Referral From Family   Druze Encounters   Druze Needs Prayer   Taxonomy   Intended Effects Demonstrate caring and concern   Methods Offer spiritual/Yazidism support   Interventions [place]     Summary:  received consult for visit, family requesting prayer.  visited with patient, and offered prayer, patient did not respond, non-verbal. Spiritual Care support can be requested via an Epic consult.     -Nancy Carrolllain Resident.

## 2023-07-11 NOTE — PLAN OF CARE
Problem: RESPIRATORY - ADULT  Goal: Achieves optimal ventilation and oxygenation  Description: INTERVENTIONS:  - Assess for changes in respiratory status  - Assess for changes in mentation and behavior  - Position to facilitate oxygenation and minimize respiratory effort  - Oxygen supplementation based on oxygen saturation or ABGs  - Provide Smoking Cessation handout, if applicable  - Encourage broncho-pulmonary hygiene including cough, deep breathe, Incentive Spirometry  - Assess the need for suctioning and perform as needed  - Assess and instruct to report SOB or any respiratory difficulty  - Respiratory Therapy support as indicated  - Manage/alleviate anxiety  - Monitor for signs/symptoms of CO2 retention  Outcome: Progressing     Problem: CARDIOVASCULAR - ADULT  Goal: Maintains optimal cardiac output and hemodynamic stability  Description: INTERVENTIONS:  - Monitor vital signs, rhythm, and trends  - Monitor for bleeding, hypotension and signs of decreased cardiac output  - Evaluate effectiveness of vasoactive medications to optimize hemodynamic stability  - Monitor arterial and/or venous puncture sites for bleeding and/or hematoma  - Assess quality of pulses, skin color and temperature  - Assess for signs of decreased coronary artery perfusion - ex.  Angina  - Evaluate fluid balance, assess for edema, trend weights  Outcome: Progressing  Goal: Absence of cardiac arrhythmias or at baseline  Description: INTERVENTIONS:  - Continuous cardiac monitoring, monitor vital signs, obtain 12 lead EKG if indicated  - Evaluate effectiveness of antiarrhythmic and heart rate control medications as ordered  - Initiate emergency measures for life threatening arrhythmias  - Monitor electrolytes and administer replacement therapy as ordered  Outcome: Progressing     Problem: SAFETY ADULT - FALL  Goal: Free from fall injury  Description: INTERVENTIONS:  - Assess pt frequently for physical needs  - Identify cognitive and physical deficits and behaviors that affect risk of falls.   - [place] fall precautions as indicated by assessment.  - Educate pt/family on patient safety including physical limitations  - Instruct pt to call for assistance with activity based on assessment  - Modify environment to reduce risk of injury  - Provide assistive devices as appropriate  - Consider OT/PT consult to assist with strengthening/mobility  - Encourage toileting schedule  Outcome: Progressing     Problem: PAIN - ADULT  Goal: Verbalizes/displays adequate comfort level or patient's stated pain goal  Description: INTERVENTIONS:  - Encourage pt to monitor pain and request assistance  - Assess pain using appropriate pain scale  - Administer analgesics based on type and severity of pain and evaluate response  - Implement non-pharmacological measures as appropriate and evaluate response  - Consider cultural and social influences on pain and pain management  - Manage/alleviate anxiety  - Utilize distraction and/or relaxation techniques  - Monitor for opioid side effects  - Notify MD/LIP if interventions unsuccessful or patient reports new pain  - Anticipate increased pain with activity and pre-medicate as appropriate  Outcome: Progressing     Problem: Patient/Family Goals  Goal: Patient/Family Long Term Goal  Description: Patient's Long Term Goal:     Interventions:  -   - See additional Care Plan goals for specific interventions  Outcome: Progressing  Goal: Patient/Family Short Term Goal  Description: Patient's Short Term Goal:     Interventions:   -  - See additional Care Plan goals for specific interventions  Outcome: Progressing

## 2023-07-11 NOTE — PLAN OF CARE
Problem: RESPIRATORY - ADULT  Goal: Achieves optimal ventilation and oxygenation  Description: INTERVENTIONS:  - Assess for changes in respiratory status  - Assess for changes in mentation and behavior  - Position to facilitate oxygenation and minimize respiratory effort  - Oxygen supplementation based on oxygen saturation or ABGs  - Provide Smoking Cessation handout, if applicable  - Encourage broncho-pulmonary hygiene including cough, deep breathe, Incentive Spirometry  - Assess the need for suctioning and perform as needed  - Assess and instruct to report SOB or any respiratory difficulty  - Respiratory Therapy support as indicated  - Manage/alleviate anxiety  - Monitor for signs/symptoms of CO2 retention  Outcome: Progressing     Problem: CARDIOVASCULAR - ADULT  Goal: Maintains optimal cardiac output and hemodynamic stability  Description: INTERVENTIONS:  - Monitor vital signs, rhythm, and trends  - Monitor for bleeding, hypotension and signs of decreased cardiac output  - Evaluate effectiveness of vasoactive medications to optimize hemodynamic stability  - Monitor arterial and/or venous puncture sites for bleeding and/or hematoma  - Assess quality of pulses, skin color and temperature  - Assess for signs of decreased coronary artery perfusion - ex.  Angina  - Evaluate fluid balance, assess for edema, trend weights  Outcome: Progressing  Goal: Absence of cardiac arrhythmias or at baseline  Description: INTERVENTIONS:  - Continuous cardiac monitoring, monitor vital signs, obtain 12 lead EKG if indicated  - Evaluate effectiveness of antiarrhythmic and heart rate control medications as ordered  - Initiate emergency measures for life threatening arrhythmias  - Monitor electrolytes and administer replacement therapy as ordered  Outcome: Progressing     Problem: SAFETY ADULT - FALL  Goal: Free from fall injury  Description: INTERVENTIONS:  - Assess pt frequently for physical needs  - Identify cognitive and physical deficits and behaviors that affect risk of falls.   - [place] fall precautions as indicated by assessment.  - Educate pt/family on patient safety including physical limitations  - Instruct pt to call for assistance with activity based on assessment  - Modify environment to reduce risk of injury  - Provide assistive devices as appropriate  - Consider OT/PT consult to assist with strengthening/mobility  - Encourage toileting schedule  Outcome: Progressing     Problem: PAIN - ADULT  Goal: Verbalizes/displays adequate comfort level or patient's stated pain goal  Description: INTERVENTIONS:  - Encourage pt to monitor pain and request assistance  - Assess pain using appropriate pain scale  - Administer analgesics based on type and severity of pain and evaluate response  - Implement non-pharmacological measures as appropriate and evaluate response  - Consider cultural and social influences on pain and pain management  - Manage/alleviate anxiety  - Utilize distraction and/or relaxation techniques  - Monitor for opioid side effects  - Notify MD/LIP if interventions unsuccessful or patient reports new pain  - Anticipate increased pain with activity and pre-medicate as appropriate  Outcome: Progressing     Problem: PAIN - ADULT  Goal: Verbalizes/displays adequate comfort level or patient's stated pain goal  Description: INTERVENTIONS:  - Encourage pt to monitor pain and request assistance  - Assess pain using appropriate pain scale  - Administer analgesics based on type and severity of pain and evaluate response  - Implement non-pharmacological measures as appropriate and evaluate response  - Consider cultural and social influences on pain and pain management  - Manage/alleviate anxiety  - Utilize distraction and/or relaxation techniques  - Monitor for opioid side effects  - Notify MD/LIP if interventions unsuccessful or patient reports new pain  - Anticipate increased pain with activity and pre-medicate as appropriate  Outcome: Progressing     Problem: Patient/Family Goals  Goal: Patient/Family Long Term Goal  Description: Patient's Long Term Goal: 02 saturations in the 90 % with nasal cannula    Interventions:  - administer antibiotics  - turn q 2 hours    - See additional Care Plan goals for specific interventions  Outcome: Progressing  Goal: Patient/Family Short Term Goal  Description: Patient's Short Term Goal: wound to coccyx healed within 2 months     Interventions:   - dressing changes q 12 hours  - turn q 2 hours     - See additional Care Plan goals for specific interventions  Outcome: Progressing

## 2023-07-12 NOTE — CM/SW NOTE
07/12/23 1413   Discharge disposition   Expected discharge disposition [facility]   Discharge transportation [facility]     Set up to [place] today at 7pm to the [facility] in [address] [facility] during transport. Report can be called to [phone]. Sister Doc Cm aware of transfer today.     Elizabeth Wiggins MBA BSN RN [address]  RN Case Manager  [phone]

## 2023-07-12 NOTE — DISCHARGE SUMMARY
[facility]    Discharge Summary    Fay Matos Patient Status:  Inpatient    1950 MRN [id number]   Location [facility] 2W/SW Attending Gris Elaine MD   Hosp Day # 3 PCP Estefani Quezada MD, Rubén Birmingham MD     Date of Admission: 2023   Date of Discharge: 2023    Admitting Diagnosis: UGIB (upper gastrointestinal bleed) [K92.2]  Acute on chronic respiratory failure with hypoxia ([address] [J96.21]  Aspiration pneumonia of right upper lobe, unspecified aspiration pneumonia type ([address] [J69.0]  Anemia, unspecified type [D64.9]    Disposition: [facility]    Discharge Diagnosis: . Principal Problem:    UGIB (upper gastrointestinal bleed)  Active Problems:    Acute on chronic respiratory failure with hypoxia (HCC)    Aspiration pneumonia of right upper lobe, unspecified aspiration pneumonia type (HCC)    Anemia, unspecified type    Goals of care, counseling/discussion    Palliative care by specialist    [facility]:   Reason for Admission: AMS    Discharge Physical Exam: General appearance:  alert, appears stated age, and cooperative  Pulmonary: clear to auscultation bilaterally  Cardiovascular: S1, S2 normal, no murmur, click, rub or gallop, regular rate and rhythm, S1, S2 normal  Abdominal: soft, non-tender; bowel sounds normal; no masses,  no organomegaly  Extremities: extremities normal, atraumatic, no cyanosis or edema  Pulses: 2+ and symmetric    Hospital Course: Pt doing better DC to [facility]    Complications: None    Consultants         Provider   Role Specialty     Lon Kelley MD  Consulting Physician Internal Medicine     Katarzyna Field MD  Consulting Physician PULMONARY DISEASES     Aquiles Coleman MD  Consulting Physician Nada Cid, Marylee Boos, MD  Consulting Physician So Laguna MD  Consulting Physician Karlee Morin MD  Consulting Physician PULMONARY DISEASES Pending Labs       Order Current Status    IV Catheter Tip Culture Preliminary result            Discharge Plan:   Discharge Condition: Stable    Current Discharge Medication List    New Orders    vancomycin 50 mg/mL Oral Recon Soln  2.5 mL (125 mg total) by Per NG Tube route daily. Home Meds - Unchanged    Nutritional Supplements (ARGINAID) Oral Powd Pack  1 packet by Enteral route in the morning and 1 packet before bedtime. Amino Acids-Protein Hydrolys (PRO-STAT AWC) Oral Liquid  30 mL by Enteral route in the morning, at noon, and at bedtime. phenol 1.4 % Mouth/Throat Liquid  Take 5 mL by mouth in the morning, at noon, and at bedtime. Apply to mouth , use as a mouth wash and suction mouth after application    ergocalciferol 8000units/mL Oral Solution  6.25 mL (50,000 Units total) by Per G Tube route once a week. Every Sunday    Ferrous Sulfate 220 (44 Fe) MG/5ML Oral Solution  7.5 mL by Per G Tube route daily. ipratropium-albuterol 0.5-2.5 (3) MG/3ML Inhalation Solution  Take 3 mL by nebulization every 6 (six) hours as needed. linezolid 600 MG/300ML Intravenous Solution  Inject 600 mg into the vein every 12 (twelve) hours. For 14 days; end date 07/18/23    Pantoprazole Sodium 40 MG Oral Powd Pack  1 packet by Per G Tube route in the morning and 1 packet before bedtime. metoclopramide 5 MG Oral Tab  1 tablet (5 mg total) by Per G Tube route 3 (three) times daily before meals. zinc sulfate 220 (50 Zn) MG Oral Cap  1 capsule (220 mg total) by Per G Tube route daily. baclofen 10 MG Oral Tab  1 tablet (10 mg total) by Per G Tube route every 8 (eight) hours as needed (muscle spasm). bisacodyl 10 MG Rectal Suppos  Place 1 suppository (10 mg total) rectally every third day as needed (constipation). metoprolol tartrate 25 MG Oral Tab  2 tablets (50 mg total) by Per G Tube route 2 (two) times daily.  Hold SBP < 100 or heart rate < 60    collagenase (SANTYL) 250 UNIT/GM External Ointment  Apply 1 Application. topically 2 (two) times daily as needed. To sacral wound    sennosides 8.8 MG/5ML Oral Syrup  Take 5 mL (8.8 mg total) by mouth every evening. Per g-tube    PEG 3350 17 g Oral Powd Pack  17 g by Per G Tube route daily. Calcium 500-125 MG-UNIT Oral Tab  1 tablet by Per G Tube route in the morning and 1 tablet before bedtime. atorvastatin 40 MG Oral Tab  1 tablet (40 mg total) by Per G Tube route nightly. docusate sodium 50 MG/5ML Oral Liquid  10 mL (100 mg total) by Per G Tube route 2 (two) times daily. magnesium oxide 400 MG Oral Tab  1 tablet (400 mg total) by Per G Tube route daily. Multiple Vitamin (MULTI-DAY VITAMINS) Oral Tab  1 tablet by Per G Tube route daily. phenytoin 125 MG/5ML Oral Suspension  Take 8 mL (200 mg total) by mouth 2 (two) times daily. acetaminophen 325 MG Oral Tab  2 tablets (650 mg total) by Per G Tube route 2 (two) times daily as needed for Pain. Give 30-60 minutes prior to wound care    Atropine Sulfate 1 % Ophthalmic Solution  1-2 drops SUBLINGUAL- every hour when necessary for excessive secretions              Discharge Diet: As tolerated    Discharge Activity: As tolerated    Follow up:            Tanna Nova MD, Josh Gayle MD  7/12/2023

## 2023-07-12 NOTE — PLAN OF CARE
Ethel Marion RN skin check done prior to transfer off Unit. Skin check performed by this RN and gatito . Wounds are as follows: unstageable wound to coccyx with dakins dressing change completed. K was replaced. Full am care and oral care completed. Will remove two iv sites prior to transfer. G tube with feeding of osmolite 1.5  rate 25 ml/hr with report to grove to increase by 10 ml q 4 hours. AT 1900 increase to 35 ml/hr. Water  flushes 180 ml q 4 hours. Received flush and prosource  packet  Blood glucose q 6 hours blood glucose at 1800 was 80 . Noted chronic garza noelle urine output was 400ml  plus 300 ml       Will remain available for any further questions or concerns.

## 2023-07-12 NOTE — CM/SW NOTE
Family is interested in palliative care when returning to the [place]. Per Palliative, family appears open for the idea of hospice. CM spoke to the liaison of the [place] [id number].  This facility used [facility]. Referral sent to [facility] and message for the liaison to have the facility follow-up with them for Valentina Carli and family's needs. / to remain available for support and/or discharge planning.       Yudy Nurse RUT BSN RN [address]  RN Case Manager  [phone]

## 2023-07-12 NOTE — DISCHARGE INSTRUCTIONS
FAMILY IS CONSIDERING TRANSITION TO HOSPICE CARE ONCE BACK AT [facility]. [facility] works with the [facility]. A referral was sent to them. Please follow-up with [facility]. [facility] [phone]. Bipap at hs was 10/4 at 30 %  On nasal cannula at 4 l nc with 02 sats at 100 %   Had k replaced this am         Please note Dr. Aditya Marino was notified and will add cefadroxil via g tube for 2 weeks.    The medication to continue for 2 weeks bid is cefadroxil suspension  500 mg via peg bid

## 2023-07-12 NOTE — PLAN OF CARE
Data: Pt received nonverbal, sinus tach on monitor and responds to noxious stimuli, on 10L HFNC. Actions/ Interventions: Dressing changed, placed on bipap, meds administered per order (see eMAR and flowsheets for documentation.)    Response:  Pt remains sinus tach, but afebrile, normotensive BP, RR and spo2 WDL, bed in lowest locked position, safety rounds and plan of care ongoing. Problem: RESPIRATORY - ADULT  Goal: Achieves optimal ventilation and oxygenation  Description: INTERVENTIONS:  - Assess for changes in respiratory status  - Assess for changes in mentation and behavior  - Position to facilitate oxygenation and minimize respiratory effort  - Oxygen supplementation based on oxygen saturation or ABGs  - Provide Smoking Cessation handout, if applicable  - Encourage broncho-pulmonary hygiene including cough, deep breathe, Incentive Spirometry  - Assess the need for suctioning and perform as needed  - Assess and instruct to report SOB or any respiratory difficulty  - Respiratory Therapy support as indicated  - Manage/alleviate anxiety  - Monitor for signs/symptoms of CO2 retention  Outcome: Progressing     Problem: CARDIOVASCULAR - ADULT  Goal: Maintains optimal cardiac output and hemodynamic stability  Description: INTERVENTIONS:  - Monitor vital signs, rhythm, and trends  - Monitor for bleeding, hypotension and signs of decreased cardiac output  - Evaluate effectiveness of vasoactive medications to optimize hemodynamic stability  - Monitor arterial and/or venous puncture sites for bleeding and/or hematoma  - Assess quality of pulses, skin color and temperature  - Assess for signs of decreased coronary artery perfusion - ex.  Angina  - Evaluate fluid balance, assess for edema, trend weights  Outcome: Progressing  Goal: Absence of cardiac arrhythmias or at baseline  Description: INTERVENTIONS:  - Continuous cardiac monitoring, monitor vital signs, obtain 12 lead EKG if indicated  - Evaluate effectiveness of antiarrhythmic and heart rate control medications as ordered  - Initiate emergency measures for life threatening arrhythmias  - Monitor electrolytes and administer replacement therapy as ordered  Outcome: Progressing     Problem: SAFETY ADULT - FALL  Goal: Free from fall injury  Description: INTERVENTIONS:  - Assess pt frequently for physical needs  - Identify cognitive and physical deficits and behaviors that affect risk of falls.   - [place] fall precautions as indicated by assessment.  - Educate pt/family on patient safety including physical limitations  - Instruct pt to call for assistance with activity based on assessment  - Modify environment to reduce risk of injury  - Provide assistive devices as appropriate  - Consider OT/PT consult to assist with strengthening/mobility  - Encourage toileting schedule  Outcome: Progressing     Problem: PAIN - ADULT  Goal: Verbalizes/displays adequate comfort level or patient's stated pain goal  Description: INTERVENTIONS:  - Encourage pt to monitor pain and request assistance  - Assess pain using appropriate pain scale  - Administer analgesics based on type and severity of pain and evaluate response  - Implement non-pharmacological measures as appropriate and evaluate response  - Consider cultural and social influences on pain and pain management  - Manage/alleviate anxiety  - Utilize distraction and/or relaxation techniques  - Monitor for opioid side effects  - Notify MD/LIP if interventions unsuccessful or patient reports new pain  - Anticipate increased pain with activity and pre-medicate as appropriate  Outcome: Progressing     Problem: Patient/Family Goals  Goal: Patient/Family Long Term Goal  Description: Patient's Long Term Goal: \    Interventions:  -   - See additional Care Plan goals for specific interventions  Outcome: Progressing  Goal: Patient/Family Short Term Goal  Description: Patient's Short Term Goal:     Interventions:   -  - See additional Care Plan goals for specific interventions  Outcome: Progressing

## 2023-07-12 NOTE — PLAN OF CARE
Problem: RESPIRATORY - ADULT  Goal: Achieves optimal ventilation and oxygenation  Description: INTERVENTIONS:  - Assess for changes in respiratory status  - Assess for changes in mentation and behavior  - Position to facilitate oxygenation and minimize respiratory effort  - Oxygen supplementation based on oxygen saturation or ABGs  - Provide Smoking Cessation handout, if applicable  - Encourage broncho-pulmonary hygiene including cough, deep breathe, Incentive Spirometry  - Assess the need for suctioning and perform as needed  - Assess and instruct to report SOB or any respiratory difficulty  - Respiratory Therapy support as indicated  - Manage/alleviate anxiety  - Monitor for signs/symptoms of CO2 retention  7/12/2023 1534 by Bob Lewis RN  Outcome: Completed  7/12/2023 1532 by Bob Lewis RN  Outcome: Progressing     Problem: CARDIOVASCULAR - ADULT  Goal: Maintains optimal cardiac output and hemodynamic stability  Description: INTERVENTIONS:  - Monitor vital signs, rhythm, and trends  - Monitor for bleeding, hypotension and signs of decreased cardiac output  - Evaluate effectiveness of vasoactive medications to optimize hemodynamic stability  - Monitor arterial and/or venous puncture sites for bleeding and/or hematoma  - Assess quality of pulses, skin color and temperature  - Assess for signs of decreased coronary artery perfusion - ex.  Angina  - Evaluate fluid balance, assess for edema, trend weights  7/12/2023 1534 by Bob Lewis RN  Outcome: Completed  7/12/2023 1532 by Bob Lewis RN  Outcome: Progressing  Goal: Absence of cardiac arrhythmias or at baseline  Description: INTERVENTIONS:  - Continuous cardiac monitoring, monitor vital signs, obtain 12 lead EKG if indicated  - Evaluate effectiveness of antiarrhythmic and heart rate control medications as ordered  - Initiate emergency measures for life threatening arrhythmias  - Monitor electrolytes and administer replacement therapy as ordered  7/12/2023 1534 by Murleen Closs, RN  Outcome: Completed  7/12/2023 1532 by Murleen Closs, RN  Outcome: Progressing     Problem: SAFETY ADULT - FALL  Goal: Free from fall injury  Description: INTERVENTIONS:  - Assess pt frequently for physical needs  - Identify cognitive and physical deficits and behaviors that affect risk of falls.   - [place] fall precautions as indicated by assessment.  - Educate pt/family on patient safety including physical limitations  - Instruct pt to call for assistance with activity based on assessment  - Modify environment to reduce risk of injury  - Provide assistive devices as appropriate  - Consider OT/PT consult to assist with strengthening/mobility  - Encourage toileting schedule  7/12/2023 1534 by Murleen Closs, RN  Outcome: Completed  7/12/2023 1532 by Murleen Closs, RN  Outcome: Progressing     Problem: PAIN - ADULT  Goal: Verbalizes/displays adequate comfort level or patient's stated pain goal  Description: INTERVENTIONS:  - Encourage pt to monitor pain and request assistance  - Assess pain using appropriate pain scale  - Administer analgesics based on type and severity of pain and evaluate response  - Implement non-pharmacological measures as appropriate and evaluate response  - Consider cultural and social influences on pain and pain management  - Manage/alleviate anxiety  - Utilize distraction and/or relaxation techniques  - Monitor for opioid side effects  - Notify MD/LIP if interventions unsuccessful or patient reports new pain  - Anticipate increased pain with activity and pre-medicate as appropriate  7/12/2023 1534 by Murleen Closs, RN  Outcome: Completed  7/12/2023 1532 by Murleen Closs, RN  Outcome: Progressing     Problem: Patient/Family Goals  Goal: Patient/Family Long Term Goal  Description: Patient's Long Term Goal: tolerate feedings to goal of 65 ml    Interventions:  - restart feeding and advance per goal  - See additional Care Plan goals for specific interventions  7/12/2023 1534 by Dago Fatima RN  Outcome: Completed  7/12/2023 1532 by Dago Fatima RN  Outcome: Progressing  Goal: Patient/Family Short Term Goal  Description: Patient's Short Term Goal: goals of care established    Interventions:   - discussed hospice care option and noted family receptive to palliative care   - See additional Care Plan goals for specific interventions  7/12/2023 1534 by Dago Fatima RN  Outcome: Completed  7/12/2023 1532 by Dago Fatima RN  Outcome: Progressing

## 2023-07-12 NOTE — PROGRESS NOTES
Patient seen earlier today, discharge plans noted. Hemoglobin stable, recommend continuing PPI and monitoring for worsening anemia.

## 2023-07-12 NOTE — PLAN OF CARE
Problem: RESPIRATORY - ADULT  Goal: Achieves optimal ventilation and oxygenation  Description: INTERVENTIONS:  - Assess for changes in respiratory status  - Assess for changes in mentation and behavior  - Position to facilitate oxygenation and minimize respiratory effort  - Oxygen supplementation based on oxygen saturation or ABGs  - Provide Smoking Cessation handout, if applicable  - Encourage broncho-pulmonary hygiene including cough, deep breathe, Incentive Spirometry  - Assess the need for suctioning and perform as needed  - Assess and instruct to report SOB or any respiratory difficulty  - Respiratory Therapy support as indicated  - Manage/alleviate anxiety  - Monitor for signs/symptoms of CO2 retention  Outcome: Progressing     Problem: CARDIOVASCULAR - ADULT  Goal: Maintains optimal cardiac output and hemodynamic stability  Description: INTERVENTIONS:  - Monitor vital signs, rhythm, and trends  - Monitor for bleeding, hypotension and signs of decreased cardiac output  - Evaluate effectiveness of vasoactive medications to optimize hemodynamic stability  - Monitor arterial and/or venous puncture sites for bleeding and/or hematoma  - Assess quality of pulses, skin color and temperature  - Assess for signs of decreased coronary artery perfusion - ex.  Angina  - Evaluate fluid balance, assess for edema, trend weights  Outcome: Progressing  Goal: Absence of cardiac arrhythmias or at baseline  Description: INTERVENTIONS:  - Continuous cardiac monitoring, monitor vital signs, obtain 12 lead EKG if indicated  - Evaluate effectiveness of antiarrhythmic and heart rate control medications as ordered  - Initiate emergency measures for life threatening arrhythmias  - Monitor electrolytes and administer replacement therapy as ordered  Outcome: Progressing     Problem: SAFETY ADULT - FALL  Goal: Free from fall injury  Description: INTERVENTIONS:  - Assess pt frequently for physical needs  - Identify cognitive and physical deficits and behaviors that affect risk of falls.   - [place] fall precautions as indicated by assessment.  - Educate pt/family on patient safety including physical limitations  - Instruct pt to call for assistance with activity based on assessment  - Modify environment to reduce risk of injury  - Provide assistive devices as appropriate  - Consider OT/PT consult to assist with strengthening/mobility  - Encourage toileting schedule  Outcome: Progressing     Problem: PAIN - ADULT  Goal: Verbalizes/displays adequate comfort level or patient's stated pain goal  Description: INTERVENTIONS:  - Encourage pt to monitor pain and request assistance  - Assess pain using appropriate pain scale  - Administer analgesics based on type and severity of pain and evaluate response  - Implement non-pharmacological measures as appropriate and evaluate response  - Consider cultural and social influences on pain and pain management  - Manage/alleviate anxiety  - Utilize distraction and/or relaxation techniques  - Monitor for opioid side effects  - Notify MD/LIP if interventions unsuccessful or patient reports new pain  - Anticipate increased pain with activity and pre-medicate as appropriate  Outcome: Progressing     Problem: PAIN - ADULT  Goal: Verbalizes/displays adequate comfort level or patient's stated pain goal  Description: INTERVENTIONS:  - Encourage pt to monitor pain and request assistance  - Assess pain using appropriate pain scale  - Administer analgesics based on type and severity of pain and evaluate response  - Implement non-pharmacological measures as appropriate and evaluate response  - Consider cultural and social influences on pain and pain management  - Manage/alleviate anxiety  - Utilize distraction and/or relaxation techniques  - Monitor for opioid side effects  - Notify MD/LIP if interventions unsuccessful or patient reports new pain  - Anticipate increased pain with activity and pre-medicate as appropriate  Outcome: Progressing     Problem: SAFETY ADULT - FALL  Goal: Free from fall injury  Description: INTERVENTIONS:  - Assess pt frequently for physical needs  - Identify cognitive and physical deficits and behaviors that affect risk of falls.   - [place] fall precautions as indicated by assessment.  - Educate pt/family on patient safety including physical limitations  - Instruct pt to call for assistance with activity based on assessment  - Modify environment to reduce risk of injury  - Provide assistive devices as appropriate  - Consider OT/PT consult to assist with strengthening/mobility  - Encourage toileting schedule  Outcome: Progressing     Problem: Patient/Family Goals  Goal: Patient/Family Long Term Goal  Description: Patient's Long Term Goal: tolerated tube feeding goal of 65 ml/hr    Interventions:  - resume tube feeding   Increase feeding 10 ml/hr   - See additional Care Plan goals for specific interventions  Outcome: Progressing  Goal: Patient/Family Short Term Goal  Description: Patient's Short Term Goal: poc progress towards hospice care    Interventions:   -  start on palliative care  - See additional Care Plan goals for specific interventions  Outcome: Progressing

## 2023-07-13 NOTE — PAYOR COMM NOTE
--------------  DISCHARGE REVIEW    Payor: Mario Rae [place] #:  [id number]  Authorization Number: [id number]    Admit date: 23  Admit time:  12:43 PM  Discharge Date: 2023  7:40 PM     Admitting Physician: Daja Garcia MD  Attending Physician:  No att. providers found  Primary Care Physician: Daja Garcia MD          Discharge Summary Notes        Discharge Summary signed by Daja Garcia MD at 2023  1:00 PM       Author: Daja Garcia MD Specialty: Internal Medicine Author Type: Physician    Filed: 2023  1:00 PM Date of Service: 2023 12:56 PM Status: Signed    : Daja Garcia MD (Physician)         [facility]    Discharge Summary    Murray Copeland Patient Status:  Inpatient    1950 MRN [id number]   Location [facility] 2W/ Attending Yani Avila MD   Hosp Day # 3 PCP Yoselin Estrada MD, Deepthi Bhatia MD     Date of Admission: 2023   Date of Discharge: 2023    Admitting Diagnosis: UGIB (upper gastrointestinal bleed) [K92.2]  Acute on chronic respiratory failure with hypoxia ([address] [J96.21]  Aspiration pneumonia of right upper lobe, unspecified aspiration pneumonia type ([address] [J69.0]  Anemia, unspecified type [D64.9]    Disposition: Mechelle Ortiz [facility]    Discharge Diagnosis: . Principal Problem:    UGIB (upper gastrointestinal bleed)  Active Problems:    Acute on chronic respiratory failure with hypoxia (HCC)    Aspiration pneumonia of right upper lobe, unspecified aspiration pneumonia type (HCC)    Anemia, unspecified type    Goals of care, counseling/discussion    Palliative care by specialist    Advance care Paulette Lee Course:   Reason for Admission: AMS    Discharge Physical Exam: General appearance:  alert, appears stated age, and cooperative  Pulmonary: clear to auscultation bilaterally  Cardiovascular: S1, S2 normal, no murmur, click, rub or gallop, regular rate and rhythm, S1, S2 normal  Abdominal: soft, non-tender; bowel sounds normal; no masses,  no organomegaly  Extremities: extremities normal, atraumatic, no cyanosis or edema  Pulses: 2+ and symmetric    Hospital Course: Pt doing better DC to SNIF    Complications: None    Consultants         Provider   Role Specialty     Becky Tang MD  Consulting Physician Internal Medicine     Anner Duane, MD  Consulting Physician PULMONARY DISEASES     Codi Vela MD  Consulting Physician Ana Lopez MD  Consulting Physician INFECTIOUS DISEASES     Juliocesar Rodriguez MD  Consulting Physician GASTROENTEROLOGY     Asmita Yen MD  Consulting Physician PULMONARY DISEASES            Pending Labs       Order Current Status    IV Catheter Tip Culture Preliminary result            Discharge Plan:   Discharge Condition: Stable    Current Discharge Medication List    New Orders    vancomycin 50 mg/mL Oral Recon Soln  2.5 mL (125 mg total) by Per NG Tube route daily. Home Meds - Unchanged    Nutritional Supplements (ARGINAID) Oral Powd Pack  1 packet by Enteral route in the morning and 1 packet before bedtime. Amino Acids-Protein Hydrolys (PRO-STAT AWC) Oral Liquid  30 mL by Enteral route in the morning, at noon, and at bedtime. phenol 1.4 % Mouth/Throat Liquid  Take 5 mL by mouth in the morning, at noon, and at bedtime. Apply to mouth , use as a mouth wash and suction mouth after application    ergocalciferol 8000units/mL Oral Solution  6.25 mL (50,000 Units total) by Per G Tube route once a week. Every Sunday    Ferrous Sulfate 220 (44 Fe) MG/5ML Oral Solution  7.5 mL by Per G Tube route daily. ipratropium-albuterol 0.5-2.5 (3) MG/3ML Inhalation Solution  Take 3 mL by nebulization every 6 (six) hours as needed. linezolid 600 MG/300ML Intravenous Solution  Inject 600 mg into the vein every 12 (twelve) hours.  For 14 days; end date 07/18/23    Pantoprazole Sodium 40 MG Oral Powd Pack  1 packet by Per G Tube route in the morning and 1 packet before bedtime. metoclopramide 5 MG Oral Tab  1 tablet (5 mg total) by Per G Tube route 3 (three) times daily before meals. zinc sulfate 220 (50 Zn) MG Oral Cap  1 capsule (220 mg total) by Per G Tube route daily. baclofen 10 MG Oral Tab  1 tablet (10 mg total) by Per G Tube route every 8 (eight) hours as needed (muscle spasm). bisacodyl 10 MG Rectal Suppos  Place 1 suppository (10 mg total) rectally every third day as needed (constipation). metoprolol tartrate 25 MG Oral Tab  2 tablets (50 mg total) by Per G Tube route 2 (two) times daily. Hold SBP < 100 or heart rate < 60    collagenase (SANTYL) 250 UNIT/GM External Ointment  Apply 1 Application. topically 2 (two) times daily as needed. To sacral wound    sennosides 8.8 MG/5ML Oral Syrup  Take 5 mL (8.8 mg total) by mouth every evening. Per g-tube    PEG 3350 17 g Oral Powd Pack  17 g by Per G Tube route daily. Calcium 500-125 MG-UNIT Oral Tab  1 tablet by Per G Tube route in the morning and 1 tablet before bedtime. atorvastatin 40 MG Oral Tab  1 tablet (40 mg total) by Per G Tube route nightly. docusate sodium 50 MG/5ML Oral Liquid  10 mL (100 mg total) by Per G Tube route 2 (two) times daily. magnesium oxide 400 MG Oral Tab  1 tablet (400 mg total) by Per G Tube route daily. Multiple Vitamin (MULTI-DAY VITAMINS) Oral Tab  1 tablet by Per G Tube route daily. phenytoin 125 MG/5ML Oral Suspension  Take 8 mL (200 mg total) by mouth 2 (two) times daily. acetaminophen 325 MG Oral Tab  2 tablets (650 mg total) by Per G Tube route 2 (two) times daily as needed for Pain. Give 30-60 minutes prior to wound care    Atropine Sulfate 1 % Ophthalmic Solution  1-2 drops SUBLINGUAL- every hour when necessary for excessive secretions              Discharge Diet: As tolerated    Discharge Activity: As tolerated    Follow up:            Cassie Gibson MD, Lexie Abraham MD  7/12/2023      Electronically signed by Alberto Irene Markus Villarreal MD on 7/12/2023  1:00 PM         REVIEWER COMMENTS

## 2023-07-14 ENCOUNTER — HOSPITAL ENCOUNTER (INPATIENT)
Facility: HOSPITAL | Age: 73
LOS: 1 days | Discharge: HOSPICE | End: 2023-07-14
Attending: EMERGENCY MEDICINE | Admitting: EMERGENCY MEDICINE
Payer: MEDICARE

## 2023-07-14 ENCOUNTER — APPOINTMENT (OUTPATIENT)
Dept: GENERAL RADIOLOGY | Facility: HOSPITAL | Age: 73
End: 2023-07-14
Attending: EMERGENCY MEDICINE
Payer: MEDICARE

## 2023-07-14 ENCOUNTER — HOSPITAL ENCOUNTER (INPATIENT)
Facility: HOSPITAL | Age: 73
End: 2023-07-14
Attending: INTERNAL MEDICINE | Admitting: INTERNAL MEDICINE
Payer: OTHER MISCELLANEOUS

## 2023-07-14 PROBLEM — J18.8 OTHER PNEUMONIA, UNSPECIFIED ORGANISM: Status: ACTIVE | Noted: 2023-07-14

## 2023-07-14 PROBLEM — J96.11 CHRONIC RESPIRATORY FAILURE WITH HYPOXIA (HCC): Status: ACTIVE | Noted: 2023-07-14

## 2023-07-14 PROBLEM — R11.2 NAUSEA AND VOMITING, UNSPECIFIED VOMITING TYPE: Status: ACTIVE | Noted: 2023-07-14

## 2023-07-14 PROBLEM — R11.2 NAUSEA AND VOMITING, UNSPECIFIED VOMITING TYPE: Status: ACTIVE | Noted: 2023-01-01

## 2023-07-14 PROBLEM — J96.11 CHRONIC RESPIRATORY FAILURE WITH HYPOXIA (HCC): Status: ACTIVE | Noted: 2023-01-01

## 2023-07-14 PROBLEM — J18.8 OTHER PNEUMONIA, UNSPECIFIED ORGANISM: Status: ACTIVE | Noted: 2023-01-01

## 2023-07-14 NOTE — ED INITIAL ASSESSMENT (HPI)
Arrived via ems from the [facility] for hypoxia, tachycardia, and vomiting. NH staff found patient to be 50% on room air. EMS notes that patient is vomiting feces. EMS also report she was just discharged from the hospital for sepsis.

## 2023-07-14 NOTE — CM/SW NOTE
Patient is a long term care resident at [facility] ([phone]), updates sent via [employer]. [facility] was arranged during last admission (7/12). Received MDO for hospice. [facility], plan for hospice meeting this afternoon.     Kasie Isaac, [address]

## 2023-07-14 NOTE — HOSPICE RN NOTE
Pt admitted GIP for pneumonia with respiratory distress. Case discussed previously with Dr. Umer Jackman by LUANA Gonzaless. Case discussed further with Dr. Terri Joseph whom agrees that pt meets criteria for [facility] level of care. Consents signed by brother. POLST on chart for signature. POC reviewed with Asmita Hamilton RN.   José Manuel May RN  [facility] TNL/SOC RN  [phone]

## 2023-07-14 NOTE — PROGRESS NOTES
07/14/23 1834   Clinical Encounter Type   Visited With Patient   Referral From Other (Comment)  (Hospice)   Referral To    Taxonomy   Intended Effects Demonstrate caring and concern   Methods Offer spiritual/Zoroastrian support   Interventions Acknowledge current situation; Active listening;Perform a blessing     Discussion: Pt seen bedside; not responsive; appeared comfortable. No family present.  provided non-Zoroastrian blessing at bedside. Plan to follow/ monitor for supportive care needs.  follow-up visit available prn and can be contacted at [phone].     Davina Johnson, Chaplain Resident

## 2023-07-14 NOTE — CM/SW NOTE
Department  notified of request for lamine HANNA referrals started. Assigned CM/SW to follow up with pt/family on further discharge planning.      Sera Garcias   July 14, 2023   09:38

## 2023-07-14 NOTE — ED QUICK NOTES
Per NH. Pr has been hypoxic and tachycardic. On arrival, Pt contracted on a NRB mask. Vomiting coffee ground emesis at times. Has G-tube placed to suction with coffee ground drainage. Pt non-verbal.    Has EMS IV in Rt ankle area.

## 2023-07-14 NOTE — ED QUICK NOTES
Orders for admission, patient is aware of plan and ready to go upstairs. Any questions, please call ED RN eliana at extension [phone].      Patient Covid vaccination status: Fully vaccinated     COVID Test Ordered in ED: None    COVID Suspicion at Admission: N/A    Running Infusions:      Mental Status/LOC at time of transport: unresponsive    Other pertinent information:   CIWA score: N/A   NIH score:  N/A PA Initiation    Medication: Dupixent 300mg/2ml Pens  Insurance Company: [employer] - Phone [phone] Fax [phone]  Pharmacy Filling the Rx:  Pending   Filling Pharmacy Phone:    Filling Pharmacy Fax:    Start Date: 7/23/2021

## 2023-07-15 NOTE — HOSPICE RN NOTE
[facility] nurse visit:  Pt is unresponsive to tactile/verbal stimuli. Pt is grimacing and moaning w/each breath. Lung sounds diminished/coarse. Bowel sounds hypoactive. Estevez draining scant amt noelle urine. No one at bedside. POC discussed w/floor nurse Chayo De Oliveira. Chayo De Oliveira to give a PRN dose of MS and increase MS GTT to 2 mg/hr. [address] [facility]  [phone]    [facility]osp[facility] rounds:  Pt in bed, unresponsive to tactile/verbal stim. Estevez w/scant amt noelle urine. Pt is resting w/out S/S of pain or dyspnea at this visit. Lung sounds diminished/coarse. Bowel sounds hypoactive. No one at bedside. POC discussed w/floor nurse Chayo De Oliveira who is in agreement.    [address] [facility]

## 2023-07-15 NOTE — CM/SW NOTE
PATRIZIA hook 7/15/23. The pt appeared to be in some discomfort, moaning with each breath. MSW rounded with CM who tended to her needs, FN informed. No family present, MSW to reach out to family and guardian. MSW provided supportive presence.   PATRIZIA Zavala  [facility]  [phone]

## 2023-07-16 NOTE — HOSPICE RN NOTE
Inpatient hospice nursing rounds. Pt transferred to 4th floor from PCCU. Morphine drip infusing at 2mg/hr. Pt moaning with every breath, BRANDON Grant will give prn dose of IVP ativan to help with restlessness.

## 2023-07-16 NOTE — PROGRESS NOTES
Patient received from PCCU, escorted by Aisha Brower. Morphine drip infusing at 2 mg/hr. Patient noted to be moaning and not looking comfortable. Patient given Morphine IVP 1 mg. Was notified by Margarita Kilgore from hospice that patient was still moaning, ativan IVP given. Patient currently resting in bed, no moaning noted. Estevez in place. Bed locked and in lowest position, rounds made. Patient to be endorsed to oncoming RN for continuation of care.

## 2023-07-16 NOTE — HOSPICE RN NOTE
Inpatient hospice nursing rounds. Pt is in bed and minimally responsive to verbal and tactile stimuli. Pt does appear anxious and has audible congestion. Spoke with Earl Mahajan RN, she will give a dose of IVP ativan for anxiety and robinul for congestion. Bilateral lung sounds are coarse. Morphine drip infusing at 2mg/hr. No family at the bedside, RH will call and update them if they are not here this afternoon. Plan to transfer pt to 4th floor once a bed is available. PPS 10. Pt remains inpatient hospice appropriate for symptom management requiring titration of IV medications.

## 2023-07-17 NOTE — HOSPICE RN NOTE
Inpatient hospice rounds. Patient opens eyes to sound and touch then closes, nonverbal; RR 10, on 2 liters of oxygen via nasal cannula, lung sounds rhoncerous, RN Roxanne Perez said she suctioned patient, thick/yellow secretions orally, morphine infusion at 3 mg/hr; edema to BLE and BUE; chronic garza in place; LBM 7/14, bowel sounds hypoactive; no family at bedside; spoke to Genmab, no needs at this time.      Shyla Pina RN, [facility]  [phone]

## 2023-07-17 NOTE — HOSPICE RN NOTE
GIP patient  Non verbal not responsive to verbal or tactile stimuli  Morphine drip in place for management of dyspnea  Shallow irregular breathing RR 20/minute  Oxygen in place at 2 liters nasal cannula  NPO  PPS 20%  Lung sounds coarse in all fields  No family at bedside will update her family  No facial grimace or moaning noted  POC discussed with [facility] BRANDON BUCIO   [facility] [id number]     Spoke with patients brother Dianelys Noel to give update. He will be here around 4pm today  [facility] [id number]

## 2023-07-17 NOTE — PROGRESS NOTES
Patient received resting in bed. Continues with  morphine drip infusing at 2 mg/hr. Patient noted to be coughing up moderate amount of secretions. This RN administered robinul and orally suctioned patient. Morphine drip increased to 3 mg/hr due to patient breathing being more labored. Estevez in place. Bed locked and in lowest position, rounds made. Patient to be endorsed to oncoming RN for continuation of care.

## 2023-07-18 NOTE — CDS QUERY
Potential Risk for Infection  CLINICAL DOCUMENTATION CLARIFICATION FORM    Dear Doctor Durga Carrasco:  Clinical information (provided below) suggests Potential Risk for Infection. For accurate ICD-10-CM code assignment to reflect severity of illness and risk of mortality,  PLEASE (X) ALL DIAGNOSES THAT APPLY. SELECTION BY PROVIDER ONLY  (  x) Sepsis  (  )  Severe Sepsis - Acute Organ System Failure or Dysfunction due to Sepsis  (  ) Other (please specify):   (  ) Unable to Determine (please comment)     Clinical Indicators:    7/12 Infectious disease PN: Acute E. Coli bacteremia? Midline source  Tachycardia: 150/153/126/ 129/ 122  Acute Hypoxic Respiratory Failure  Hypotension    Risk Factors:    Infection: Aspiration Pneumonia        Treatments/Orders:     IV ATB -ceftriaxone  Blood cultures (+)  IVF  ID Consult          If you have any questions, please contact Clinical : Nithin Carter RN CDS  at [facility] [email or web address] [email or web address] [phone]  Thank You!       THIS FORM IS A PERMANENT PART OF THE MEDICAL RECORD

## 2023-07-18 NOTE — HOSPICE RN NOTE
Inpatient hospice nursing rounds. Pt is in bed and remains unresponsive to verbal and tactile stimuli. Morphine drip infusing at 3mg/hr to help manage dyspnea, no S/S of pain at this time. Respirations are 16, shallow and irregular, bilateral lung sounds are coarse. PPS 10. No family at the bedside today, RH will call and update them if they are not here this afternoon. Pt remains inpatient hospice appropriate for symptom management requiring titration of IV medications. POC discussed with Gustavo Mcguire RN.

## 2023-07-18 NOTE — PROGRESS NOTES
07/18/23 0905   Clinical Encounter Type   Visited With Patient   Taxonomy   Methods Offer spiritual/Yazidism support   Interventions Active listening;Perform a blessing     Discussion: Pt seen bedside; not responsive.  spoke to pt and provided non-Yazidism blessing. Called and left voicemail for pt's brother Dianelys Noel to inform him of visit and to offer support. Plan to follow/ monitor for supportive care as indicated.  follow-up visit available prn and can be contacted at [phone].     Marly Valentin, Chaplain Resident

## 2023-07-18 NOTE — CM/SW NOTE
MSW routine visit 7/18/23. The pt appeared to have labored breathing, but seemed to find comfort throughout the day. No family present.   PATRIZIA Waddell Chi  [facility]  [phone]

## 2023-07-18 NOTE — PROGRESS NOTES
Patient received resting in bed. Continues with morphine drip infusing at 3 mg/hr. Patient noted to be coughing up moderate amount of secretions again today. This RN administered robinul  and lasix and orally suctioned patient. Morphine drip increased to 4 mg/hr due to patient breathing being more labored and dressing change to sacrum and repositioning. Estevez in place. [address] visitors at bedside today. Bed locked and in lowest position, rounds made. Patient to be endorsed to oncoming RN for continuation of care.

## 2023-07-19 NOTE — PROGRESS NOTES
23 1532   Clinical Encounter Type   Visited With Patient and family together   Taxonomy   Intended Effects Demonstrate caring and concern   Methods Offer emotional support; Offer spiritual/Confucianism support;Encourage sharing of feelings;Encourage story-telling   Interventions Acknowledge current situation; Active listening;Ask questions to bring forth feelings;[place];Discuss concerns; Facilitate communication between patient and/or family member and care team     Discussion: Pt seen bedside with co-guardian Abner Kearney present. Pt appeared to have eyebrows scrunched and demonstrated noisy breathing; responses not observed.  provided emotional support, active listening, compassionate presence, and prayer with sister-in-law/ co-guardian; encouraged story-telling/ sharing about pt. Abner Caio described pt as having a sweet spirit, had undergone TBI at age [de-identified], and was functional and independent in a lot of ways but not able to attend school. Abner Kearney indicated she is a retired RN, her mother  in January, and her  Mya Reyes, pt's brother, is close with pt. They are Sherrel Helm, although it is unclear if pt attended [employer]. She also expressed other family members' illness, and discussed her concerns about pt's history of pressure sore. She denied need for additional support.  facilitated RN Kirk Seip to return to room to answer Dee's questions. Plan to follow for supportive care as indicated.  follow-up visit available prn and can be contacted at [phone].     Ximena Barry, Chaplain Resident

## 2023-07-19 NOTE — HOSPICE RN NOTE
[facility] nurse visit: Pt in bed, unresponsive to tactile/verbal stimuli. Pt is audibly congested and lung sounds are congested throughout. Pt w/contracted knees, garza w/scant amt noelle urine. No one at bedside. POC discussed w/floor nurse, Christopher Alford to give PRN Lasix/Robinul for congestion. MS GTT at 4mg/hr. Pt remains GIP LOC appropriate for frequent o/a and treat dyspnea/pain. And EOL Care.       Sonny Haynes RN  [facility]  [phone]

## 2023-07-19 NOTE — HOSPICE RN NOTE
Addendum: 8:45pm: Made aware by floor rn, prior to medication adjustment of morphine pt passed at 8:35pm. Floor RN attempted to call guardian, no answer, Floor rn called brother and was able to relay message. Brother and his wife coming in now.  home as per consents listed as [facility] home, this writer called to inform. [facility] inpatient nursing rounds: pt visited at bedside, no visitors present. Pt remains unresponsive, RR=24. Severe congestion appreciated. Pt repositioned to laying flat and on R side with pillow support. Oral suction provided. Secretions seem slightly less. Recommending increase morphine drip to 5mg/hr for tachypnea, hyoscyamine tabs and 2nd scopolamine patch ordered for added congestion relief. Pt has received IVP lasix, robinul,  PO atropine in last 24hrs. Pt contracted, garza in place. Body temp is warm and moist. RH to continue to follow. POC discussed with BRANDON Webster. Please call with questions or if pt passes.     Bon Escamilla RN, BSN  [facility] Nurse Liaison  Office: [phone] or After Hours: [phone]

## 2023-07-20 NOTE — DISCHARGE SUMMARY
[place] [facility]    Discharge Summary    Lillie American Patient Status:  Inpatient    1950 MRN [id number]   Location [facility] 4W/SW/SE Attending No att. providers found   Hosp Day # 5 PCP Phebe Dubin MD, Nahomi Penaloza MD     Date of Admission: 2023   Date of Discharge: 2023    Admitting Diagnosis: pneumonia  Other pneumonia, unspecified organism    Disposition:     Discharge Diagnosis: . Active Problems:    Other pneumonia, unspecified organism      Hospital Course:   Reason for Admission: SOB    Discharge Physical Exam: No exam performed today, . Hospital Course: Pt put on hospice and comfort care, family notified of poor prognosis            Discharge Plan:       There are no discharge medications for this patient. Follow up:              Phebe Dubin MD, Nahomi Penaloza MD  2023

## 2025-04-04 NOTE — DISCHARGE PLANNING
Patient chart reviewed for discharge: Medication Reconciliation completed, Specialist/PCP follow up listed, and disease specific Instructions/Education included in After Visit Summary. Discharge RN notified patient's RN of AVS completion and verified all consultants have signed off. Patient's RN to notify DC RN if discharge status changes.         Fay Malik RN, Discharge Leader [id number]
Per discussion at discharge rounds this morning, patient has PICC line and is receiving IV antibiotics. No tests or procedures ordered for patient. Primary RN clarified discharge clearance with ID physician. Discharge RN called answering service to page attending at 4pm with no call back. Answering service again with request for Dr. Perico Tapia to call primary RN regarding discharge plan. Primary RN and CM updated.      Carmelita Corea RN, Discharge Leader [id number]
not applicable (Male)

## (undated) DEVICE — ENDOSCOPY PACK UPPER: Brand: MEDLINE INDUSTRIES, INC.

## (undated) DEVICE — Device: Brand: DEFENDO AIR/WATER/SUCTION AND BIOPSY VALVE

## (undated) NOTE — ED AVS SNAPSHOT
Maicol Rubalcava   MRN: [id number]    Department:  [facility] Emergency Department   Date of Visit:  3/23/2019           Disclosure     Insurance plans vary and the physician(s) referred by the ER may not be covered by your plan.  Please contact y within the next three months to obtain basic health screening including reassessment of your blood pressure.     IF THERE IS ANY CHANGE OR WORSENING OF YOUR CONDITION, CALL YOUR PRIMARY CARE PHYSICIAN AT ONCE OR RETURN IMMEDIATELY TO THE EMERGENCY DEPARTMEN

## (undated) NOTE — LETTER
[address] [facility]     I agree to have a Peripherally Inserted Central Catheter (PICC) placed in my arm.    1. The PICC insertion procedure, care, maintenance, risks, benefits, and complications have been explained to me b 7. The person performing this procedure has discussed the potential benefits, risks, and side effects of the PICC; the likelihood of achieving goals; and potential problems that might occur during recuperation.  They also discussed reasonable alternatives t

## (undated) NOTE — ED AVS SNAPSHOT
Tatyana Glenn   MRN: [id number]    Department:  [facility] Emergency Department   Date of Visit:  3/18/2019           Disclosure     Insurance plans vary and the physician(s) referred by the ER may not be covered by your plan.  Please contact y within the next three months to obtain basic health screening including reassessment of your blood pressure.     IF THERE IS ANY CHANGE OR WORSENING OF YOUR CONDITION, CALL YOUR PRIMARY CARE PHYSICIAN AT ONCE OR RETURN IMMEDIATELY TO THE EMERGENCY DEPARTMEN

## (undated) NOTE — IP AVS SNAPSHOT
[address]  [address] [facility] [phone]                Discharge Summary   5/30/2017    Phill Hooker           Admission Information        Provider Department    5/30/2017 Jevon Looney MD, Haroon Escamilla MD [facility] ascorbic acid 100 MG Tabs   Commonly known as:  VITAMIN C   Next dose due:  6/7/17   Notes to Patient:  Take at 9pm        Take 100 mg by mouth 2 (two) times daily.                                Atropine Sulfate 1 % Soln   Next dose due:  As needed Next dose due:  As needed every 2 hours for pain        5 mg = 0.25 mL sublingual every 2 hours when necessary pain    Edward Rodriguez                           MULTI-DAY VITAMINS Tabs   Next dose due:  6/8/17   Notes to Patient:  Take at 9am        Take by St. 3 on sacrum: wound gel, gauze, and ABD dressing change daily and PRN. Immunization History as of 6/7/2017  Never Reviewed    No immunizations on file.       Recent Hematology Lab Results  (Last 3 results in the past 90 days)    WBC RBC Hemoglobin Hem 122 (H)      Pending Labs     Order Current Status    RAINBOW DRAW DARK GREEN Collected (05/30/17 1690)      Radiology Exams     None      Patient Belongings       Most Recent Value    All belongings returned to patient at discharge Pt's bedside belongings _____________________________________________________________________________    Medication Side Effects - Medications to be taken at home  As your caregivers, we want you to be aware of the medications you are prescribed to take and their potential S What to report to your healthcare team: Stomach upset, unresolved pain           General Nerve Function Medications     ClonazePAM 0.5 MG Oral Tab    phenytoin 125 MG/5ML Oral Suspension       Use:  Treat conditions such as seizures, headaches, depression,

## (undated) NOTE — IP AVS SNAPSHOT
[facility]            (For Outpatient Use Only) Initial Admit Date: 2022   Inpt/Obs Admit Date: Inpt: 22 / Obs: N/A   Discharge Date:    Yasmin Marlin:  [de-identified]   MRN: [de-identified]   CSN: [id number]   CEID: [id number]        ENCOUNTER  Patient Class: Inpatient Admitting Provider: Danya Carnes MD Unit: [address] [place], [place] [address]W/SE   Hospital Service: Medical Attending Provider: Danya Carnes MD   Bed: 526-A   Visit Type:   Referring Physician: No ref. provider found Billing Flag:    Admit Diagnosis: Upper GI bleed [K92.2]      PATIENT  Legal Name:   Praful Carney    Legal Sex: Female  Gender ID:              [address] [place] [address],[address] Name:    PCP:  Aquilino Ríos MD Home: [phone]   Address:  [address] [place] [address] : 1950 (71 yrs) Mobile: [phone]         City/State/Zip: [facility] [address] Marital: Single Language: Diya Carias [place] SSN4: [id number] Latter-day: No Latter-day Given-No Shalini*     Race: Black Or  Ethnicity: Non  Or  O*   EMERGENCY CONTACT   Name Relationship Legal Guardian? Home Phone Work Phone Mobile Phone   1.  Dee Penaloza  2. *No Contact Specified* Guardian            [phone]       GUARANTOR  Guarantor: Slick Yues : 1950 Home Phone: [phone]   Address: [address] [place] [address]  Sex: Female Work Phone:    City/State/Zip: [address]   Rel. to Patient: Self Guarantor ID: [id number]   GUARANTOR EMPLOYER   Employer:  Status: RETIRED     COVERAGE  PRIMARY INSURANCE   Payor: [address] Number:  Insurance Type: INDEMNITY   Subscriber Name: Madhavi Shaw : 1950   Subscriber ID: [id number] Pt Rel to Subscriber: Self   SECONDARY INSURANCE   Payor: MEDICAID Plan: MEDICAID   Group Number:  Insurance Type: INDEMNITY   Subscriber Name: Madhavi Shaw : 1950   Subscriber ID: [id number] Pt Rel to Subscriber: [address] [place] [address] Payor:  Plan:    Group Number:  Insurance Type:    Subscriber Name:  Subscriber :    Subscriber ID:  Pt Rel to Subscriber:    Hospital Account Financial Class: Medicare Advantage    2022

## (undated) NOTE — LETTER
Venita Burnett [address], [place], [place]  [address]  INFORMED CONSENT FOR TRANSFUSION OF BLOOD OR BLOOD PRODUCTS  My physician has informed me of the nature, purpose, benefits and risks of transfusion for blood and blood components that he/she may deem necessary during my treatment or hospitalization. He/she has also discussed alternatives to receiving blood from the voluntary blood supply with me, such as self-donation (autologous) and directed donation (blood donated by family or friends to be used specifically for me). I further understand that while the [address] [place] [address] will attempt to supply any autologous or directed donor blood prior to transfusion of blood from the routine blood supply, medical circumstances may require that other or additional blood components may be required for my care. In giving consent, I acknowledge that my physician has also informed me that despite careful screening and testing in accordance with national and regional regulations, there is still a small risk of transmission of infectious agents including hepatitis, HIV-1/2, cytomegalovirus and other viruses or diseases as yet unknown for which licensed definitive screening tests do not currently exist. Additionally, my physician has informed me of the potential for transfusion reactions not related to an infectious agent. [  ]  Check here for Recurring Outpatient Transfusion Therapy (valid for 1 year) In addition to the above, my physician has informed me that I shall receive numerous transfusions over a period of time and that these can lead to other increased risks. I hereby authorize the transfusion of blood and/or blood products to me as deemed necessary and ordered by physicians participating in my care.  My physician has given me the opportunity to ask questions and any questions asked have been answered to my satisfaction  __________________________________________ ______________________________________________  (Signature of Patient)                                                            (Responsible party in case of Minor,                                                                                                 Incompetent, or unconscious Patient)  ___________________________________________       ________ ______________________________________  (Relationship to Patient)                                                       (Signature of Witness)  ______________________________________________________________________________________________   (Date)                                                                           (Time)  REFUSAL OF CONSENT FOR BLOOD TRANSFUSIONS   Sign only if Refusing   [  ] I understand refusal of blood or blood products as deemed necessary by my physician may have serious consequences to my condition to include possible death.  I hereby assume responsibility for my refusal and release the hospital, its personnel, and my physicians from any responsibility for the consequences of my refusal.    ________________________________________________________________________________  (Signature of Patient)                                                         (Responsible Party/Relationship to Patient)    ________________________________________________________________________________  (Signature of Witness)                                                       (Date/Time)     Patient Name: Tomas Pete     : 1950                 Printed: 7/10/2023      Medical Record #: [id number]                                 Page 1 of 1

## (undated) NOTE — ED AVS SNAPSHOT
Oscar Alvarez   MRN: [id number]    Department:  [facility] Emergency Department   Date of Visit:  7/13/2019           Disclosure     Insurance plans vary and the physician(s) referred by the ER may not be covered by your plan.  Please contact y within the next three months to obtain basic health screening including reassessment of your blood pressure.     IF THERE IS ANY CHANGE OR WORSENING OF YOUR CONDITION, CALL YOUR PRIMARY CARE PHYSICIAN AT ONCE OR RETURN IMMEDIATELY TO THE EMERGENCY DEPARTMEN

## (undated) NOTE — IP AVS SNAPSHOT
Patient Demographics     Address  [address] [place] [address] Phone  [phone] [facility])  [phone] (Mobile) *Preferred*      Emergency Contact(s)     Name Relation Home Work [address] [facility] [phone] Commonly known as: ERGOCALCIFEROL  Next dose due: Resume home regimen      Take 5,000 Units by mouth once a week. ferrous sulfate 325 (65 FE) MG Tbec  Next dose due: Take this evening      Take 325 mg by mouth 2 (two) times daily with meals. [id number] - MAR ACTION REPORT  (last 24 hrs)    ** SITE UNKNOWN **     Order ID Medication Name Action Time Action Reason Comments    [id number] Meropenem (MERREM) 500 mg in sodium chloride 0.9% 100 mL MBP 02/10/21 1125 New Bag      [id number] Merop Anion Gap 4 0 - 18 mmol/L — [facility] ([facility])   BUN 10 7 - 18 mg/dL [facility])   Creatinine 0.40 0.55 - 1.02 mg/dL L [facility])   BUN/CREA Ratio 25.0 10.0 - 20.0 H [facility] ([facility])   Calcium, Total 8.7 8.5 - 10.1 mg/dL [employer] ( Order Status: Completed Lab Status: Final result Updated: 02/07/21 1710    Specimen: Other from [place]      Rapid SARS-CoV-2 by PCR Invalid    Rapid SARS-CoV-2 by PCR STAT [[id number]]  (Normal) Collected: 02/07/21 1522    Order Status: Completed Lab Status Social History:  Social History    Tobacco Use      Smoking status: Unknown If Ever Smoked      Smokeless tobacco: Never Used    Alcohol use: Not on file    Drug use: Not on file    Allergies/Medications:    Allergies:   Zosyn [Piperacillin*    UNKNOWN  (No ECG Report  Interpretation  --------------------------       Assessment/Plan:     Sepsis due to possible aspiration start on zosyn ID eval  H/O HTN hold BP meds  Will hold tube feedings  GI bleed GI eval      PHYSICIAN Certification of Need for Inpatient H Gissel Napoles is a a(n) 72year old female with a history of traumatic brain injury at age 4, seizure disorder, hypertension, dysphagia s/p G-tube who was admitted from [address] [place] [address] with episode of respiratory distress and low blood pressure after emesis.  Pa Vital signs: Blood pressure 127/72, pulse 99, temperature 98.3 °F (36.8 °C), resp. rate 18, weight 150 lb (68 kg), SpO2 96 %.     Physical examination deferred; please see primary service notes    Laboratory Data:  Lab Results   Component Value Date    WBC Discharge Summary signed by Gabrielle Pablo MD at 2/11/2021 10:28 AM  Version 1 of 1    Author: Gabrielle Pablo MD Service: Internal Medicine Author Type: Physician    Filed: 2/11/2021 10:28 AM Date of Service: 2/11/2021 10:26 AM Status: Signed    Ed Muriel Schuler MD Consulting Physician  Internal [facility] 1762. 1]          Pending Labs     Order Current Status    BLOOD CULTURE Preliminary result    BLOOD CULTURE Preliminary result[KK. 2]          Discharge Plan:   Discharge Condition: Stable    Curr !! - Potential duplicate medications found. Please discuss with provider. Discharge Diet: As tolerated    Discharge Activity: As tolerated    Follow up: Follow up Labs: 1 week        DELIO RODRIGUEZ [facility] MD, Natalio Steen  2/11/2021[KK. 1]    Electronicall

## (undated) NOTE — IP AVS SNAPSHOT
Patient Demographics     Address Phone    [address] [facility] [address] [phone] [facility])      Emergency Contact(s)     Name Relation Home Work [address] [facility] [phone]        Allergies as of 6/7/2017  Reviewed on: 6/6/ Next dose due:  As needed 2 times a day for anxiety        Take 0.5 mg by mouth 2 (two) times daily as needed for Anxiety. DERMACERIN Crea   Next dose due:  As needed        Apply topically as needed.                             D phenytoin 125 MG/5ML Susp   Last time this was given:  200 mg on 6/5/2017 11:10 PM   Commonly known as:  DILANTIN   Next dose due:  6/8/17   Notes to Patient:  Check Dilantin level 6/8/17 in AM. Dilantin held 6/7/17 due to Dilantin level of 21.1.         [employer] [id number] Pantoprazole Sodium (PROTONIX) 40 mg in Sodium Chloride 0.9 % 10 mL IV push 06/06/17 1829 Given      [id number] acetaminophen (TYLENOL) 160 MG/5ML oral liquid 650 mg 06/07/17 0402 Given      [id number] acetaminophen (TYLENOL) 160 MG/5ML oral liqui Microbiology Results (All)     Procedure Component Value Units Date/Time    Anaerobic Culture Once [[id number]] Collected:  05/31/17 0119    Order Status:  Completed Lab Status:  Final result Updated:  06/03/17 1258    Specimen Information:  Other from [facility] Piperacillin + Tazobactam <=4  Sensitive    Trimethoprim/Sulfa <=20  Sensitive                   Urine Culture, Routine Once [[id number]]  (Abnormal)  (Susceptibility) Collected:  05/30/17 2050    Order Status:  Completed Lab Status:  Final result Updated: History & Physical Examination    Patient Name: Aditya Worthy  MRN: [id number]  CSN: [id number]  YOB: 1950    Diagnosis: dysphagia. Present Illness: dysphagia.        Prescriptions prior to admission:  ascorbic acid 100 MG Oral Tab Take 1 sublingual every 2 hours when necessary pain Disp: 30 mL Rfl: 0    LORazepam 2 MG/ML Oral Conc Take 0.25 mL (0.5 mg total) by mouth every 2 (two) hours as needed.  Disp: 30 mL Rfl: 1    Atropine Sulfate 1 % Ophthalmic Solution 1-2 drops SUBLINGUAL- every ho EXTREMITIES [ ] [ ]    OTHER        [ x ] I have discussed the risks and benefits and alternatives with the patient/family. They understand and agree to proceed with plan of care.   [ x ] I have reviewed the History and Physical done within the last 30 day Tobacco/Alcohol use not on file         Current Medications:    Current Facility-Administered Medications:  silver sulfADIAZINE (SILVADENE) 1 % cream  Topical BID   meropenem (MERREM) IVPB 500 mg/100 ml in 0.9% NaCl minibag 500 mg Intravenous Q8H   Norepin Nutritional Supplements (ARGINAID) Oral Powd Pack Take 1 packet by mouth 2 (two) times daily. Wound Dressings [facility]) External Gel Apply 1 Application topically daily.    morphINE Sulfate, Concentrate, 20 MG/ML Oral Solution 5 mg = 0.25 mL sublingual e calcification aorta. Xr Chest Ap Portable  (cpt=71010)    5/30/2017  CONCLUSION:  1. Borderline cardiomegaly. Tortuous thoracic aorta.  2. Bilateral mixed alveolar and interstitial opacification, more pronounced in a right perihilar and basilar braden Gastrointestinal hemorrhage, unspecified gastrointestinal hemorrhage type [K92.2]    Disposition: NH    Discharge Diagnosis: . Principal Problem:    Sepsis with hypotension ([facility] [address]  Active Problems:    Gastrointestinal hemorrhage, unspecified gastrointestinal phenytoin 125 MG/5ML Oral Suspension  Take 200 mg by mouth 2 (two) times daily. Piperacillin Sod-Tazobactam So 4.5 (4-0.5) g Intravenous Recon Soln  4 mg every 8 (eight) hours.     collagenase (SANTYL) 250 UNIT/GM External Ointment  Apply topically daily

## (undated) NOTE — LETTER
Venita Burnett [address], [facility], [address] [address]  INFORMED CONSENT FOR TRANSFUSION OF BLOOD OR BLOOD PRODUCTS  My physician has informed me of the nature, purpose, benefits and risks of transfusion for blood and blood components that he/she may deem necessary during my treatment or hospitalization. He/she has also discussed alternatives to receiving blood from the voluntary blood supply with me, such as self-donation (autologous) and directed donation (blood donated by family or friends to be used specifically for me). I further understand that while the [address] [place] [address] will attempt to supply any autologous or directed donor blood prior to transfusion of blood from the routine blood supply, medical circumstances may require that other or additional blood components may be required for my care. In giving consent, I acknowledge that my physician has also informed me that despite careful screening and testing in accordance with national and regional regulations, there is still a small risk of transmission of infectious agents including hepatitis, HIV-1/2, cytomegalovirus and other viruses or diseases as yet unknown for which licensed definitive screening tests do not currently exist. Additionally, my physician has informed me of the potential for transfusion reactions not related to an infectious agent. [  ]  Check here for Recurring Outpatient Transfusion Therapy (valid for 1 year) In addition to the above, my physician has informed me that I shall receive numerous transfusions over a period of time and that these can lead to other increased risks. I hereby authorize the transfusion of blood and/or blood products to me as deemed necessary and ordered by physicians participating in my care.  My physician has given me the opportunity to ask questions and any questions asked have been answered to my satisfaction  __________________________________________ ______________________________________________  (Signature of Patient)                                                            (Responsible party in case of Minor,                                                                                                 Incompetent, or unconscious Patient)  ___________________________________________       ________ ______________________________________  (Relationship to Patient)                                                       (Signature of Witness)  ______________________________________________________________________________________________   (Date)                                                                           (Time)  REFUSAL OF CONSENT FOR BLOOD TRANSFUSIONS   Sign only if Refusing   [  ] I understand refusal of blood or blood products as deemed necessary by my physician may have serious consequences to my condition to include possible death.  I hereby assume responsibility for my refusal and release the hospital, its personnel, and my physicians from any responsibility for the consequences of my refusal.    ________________________________________________________________________________  (Signature of Patient)                                                         (Responsible Party/Relationship to Patient)    ________________________________________________________________________________  (Signature of Witness)                                                       (Date/Time)     Patient Name: Migdalia Lee     : 1950                 Printed: 10/16/2022      Medical Record #: [id number]                                 Page 1 of 1

## (undated) NOTE — ED AVS SNAPSHOT
Hema Collado   MRN: [id number]    Department:  [facility] Emergency Department   Date of Visit:  7/14/2019           Disclosure     Insurance plans vary and the physician(s) referred by the ER may not be covered by your plan.  Please contact y within the next three months to obtain basic health screening including reassessment of your blood pressure.     IF THERE IS ANY CHANGE OR WORSENING OF YOUR CONDITION, CALL YOUR PRIMARY CARE PHYSICIAN AT ONCE OR RETURN IMMEDIATELY TO THE EMERGENCY DEPARTMEN

## (undated) NOTE — IP AVS SNAPSHOT
[facility]            (For Outpatient Use Only) Initial Admit Date: 2/7/2021   Inpt/Obs Admit Date: Inpt: 2/7/21 / Obs: N/A   Discharge Date:    Radha Long:  [de-identified]   MRN: [de-identified]   CSN: [id number]   CEID: [id number]        [facility] Subscriber Name:  Elizabeth Nichol :    Subscriber ID:  Pt Rel to Subscriber:    Hospital Account Financial Class: Medicare Advantage    2021

## (undated) NOTE — IP AVS SNAPSHOT
[facility]            (For Outpatient Use Only) Initial Admit Date: 10/15/2022   Inpt/Obs Admit Date: Inpt: 10/15/22 / Obs: N/A   Discharge Date:    Fabby Lanes:  [de-identified]   MRN: [de-identified]   CSN: [id number]   CEID: [id number]        ENCOUNTER  Patient Class: Inpatient Admitting Provider: Jaison Soriano MD Unit: [address] [place] [address] Service: Cardiac Telemetry Attending Provider: Jaison Soriano MD   Bed: 323-A   Visit Type:   Referring Physician: No ref. provider found Billing Flag:    Admit Diagnosis: Dehydration [E86.0]      PATIENT  Legal Name:   Yuki Helton    Legal Sex: Female  Gender ID:              [address] [place] [address],[address] Name:    PCP:  Suyapa Asif MD Home: [phone]   Address:  [address] [place] [address] : 1950 (71 yrs) Mobile: [phone]         City/[facility]/Zip[facility] [address] Marital: Single Language: Bobby Garrisonmich: Nile Carvalho SSN4: [id number] Orthodox: No Orthodox Given-No Shalini*     Race: Black Or  Ethnicity: Non  Or  O*   EMERGENCY CONTACT   Name Relationship Legal Guardian? Home Phone Work Phone Mobile Phone   1.  Dee Penaloza  2. *No Contact Specified* Guardian            [phone]       GUARANTOR  Guarantor: Talia Hyde : 1950 Home Phone: [phone]   Address: [address] [place] [address]  Sex: Female Work Phone:    City/State/Zip: [address]   Rel. to Patient: Self Guarantor ID: [id number]   [address]   Employer:  Status: RETIRED     COVERAGE  PRIMARY INSURANCE   Payor: [address] Number:  Insurance Type: INDEMNITY   Subscriber Name: Denise Yan : 1950   Subscriber ID: [id number] Pt Rel to Subscriber: Self   SECONDARY INSURANCE   Payor:  Plan:    Group Number:  Insurance Type:    Subscriber Name:  Subscriber :    Subscriber ID:  Pt Rel to Subscriber:    TERTIARY INSURANCE   Payor:  Plan:    Group Number:  Insurance Type: Subscriber Name:  Rula Dumont :    Subscriber ID:  Pt Rel to Subscriber:    Hospital Account Financial Class: Medicare Advantage    2022